# Patient Record
Sex: MALE | Race: WHITE | NOT HISPANIC OR LATINO | Employment: OTHER | URBAN - METROPOLITAN AREA
[De-identification: names, ages, dates, MRNs, and addresses within clinical notes are randomized per-mention and may not be internally consistent; named-entity substitution may affect disease eponyms.]

---

## 2017-02-20 ENCOUNTER — OFFICE VISIT (OUTPATIENT)
Dept: FAMILY MEDICINE | Facility: CLINIC | Age: 67
End: 2017-02-20
Payer: MEDICARE

## 2017-02-20 VITALS
SYSTOLIC BLOOD PRESSURE: 150 MMHG | HEIGHT: 68 IN | TEMPERATURE: 97.7 F | DIASTOLIC BLOOD PRESSURE: 74 MMHG | WEIGHT: 202 LBS | OXYGEN SATURATION: 96 % | BODY MASS INDEX: 30.62 KG/M2 | HEART RATE: 55 BPM

## 2017-02-20 DIAGNOSIS — R73.01 IMPAIRED FASTING GLUCOSE: ICD-10-CM

## 2017-02-20 DIAGNOSIS — R93.429 ABNORMAL ULTRASOUND OF KIDNEY: ICD-10-CM

## 2017-02-20 DIAGNOSIS — I10 ESSENTIAL HYPERTENSION WITH GOAL BLOOD PRESSURE LESS THAN 140/90: ICD-10-CM

## 2017-02-20 DIAGNOSIS — I70.202 ATHEROSCLEROSIS OF NATIVE ARTERY OF LEFT LOWER EXTREMITY, WITH UNSPECIFIED PRESENCE OF CLINICAL MANIFESTATION (H): Primary | ICD-10-CM

## 2017-02-20 DIAGNOSIS — I70.0 ATHEROSCLEROSIS OF AORTA (H): ICD-10-CM

## 2017-02-20 LAB
ALBUMIN SERPL-MCNC: 3.9 G/DL (ref 3.4–5)
ALP SERPL-CCNC: 86 U/L (ref 40–150)
ALT SERPL W P-5'-P-CCNC: 55 U/L (ref 0–70)
ANION GAP SERPL CALCULATED.3IONS-SCNC: 7 MMOL/L (ref 3–14)
AST SERPL W P-5'-P-CCNC: 33 U/L (ref 0–45)
BILIRUB DIRECT SERPL-MCNC: 0.2 MG/DL (ref 0–0.2)
BILIRUB SERPL-MCNC: 1.1 MG/DL (ref 0.2–1.3)
BUN SERPL-MCNC: 20 MG/DL (ref 7–30)
CALCIUM SERPL-MCNC: 8.8 MG/DL (ref 8.5–10.1)
CHLORIDE SERPL-SCNC: 104 MMOL/L (ref 94–109)
CHOLEST SERPL-MCNC: 120 MG/DL
CO2 SERPL-SCNC: 28 MMOL/L (ref 20–32)
CREAT SERPL-MCNC: 0.88 MG/DL (ref 0.66–1.25)
GFR SERPL CREATININE-BSD FRML MDRD: 86 ML/MIN/1.7M2
GLUCOSE SERPL-MCNC: 107 MG/DL (ref 70–99)
HDLC SERPL-MCNC: 54 MG/DL
LDLC SERPL CALC-MCNC: 41 MG/DL
NONHDLC SERPL-MCNC: 66 MG/DL
POTASSIUM SERPL-SCNC: 4.1 MMOL/L (ref 3.4–5.3)
PROT SERPL-MCNC: 7.6 G/DL (ref 6.8–8.8)
SODIUM SERPL-SCNC: 139 MMOL/L (ref 133–144)
TRIGL SERPL-MCNC: 123 MG/DL

## 2017-02-20 PROCEDURE — 36415 COLL VENOUS BLD VENIPUNCTURE: CPT | Performed by: FAMILY MEDICINE

## 2017-02-20 PROCEDURE — 80076 HEPATIC FUNCTION PANEL: CPT | Performed by: FAMILY MEDICINE

## 2017-02-20 PROCEDURE — 99214 OFFICE O/P EST MOD 30 MIN: CPT | Performed by: FAMILY MEDICINE

## 2017-02-20 PROCEDURE — 80048 BASIC METABOLIC PNL TOTAL CA: CPT | Performed by: FAMILY MEDICINE

## 2017-02-20 PROCEDURE — 80061 LIPID PANEL: CPT | Performed by: FAMILY MEDICINE

## 2017-02-20 PROCEDURE — 83036 HEMOGLOBIN GLYCOSYLATED A1C: CPT | Performed by: FAMILY MEDICINE

## 2017-02-20 RX ORDER — ATORVASTATIN CALCIUM 40 MG/1
TABLET, FILM COATED ORAL
Qty: 90 TABLET | Refills: 1 | Status: SHIPPED | OUTPATIENT
Start: 2017-02-20 | End: 2017-07-31

## 2017-02-20 RX ORDER — LISINOPRIL 10 MG/1
10 TABLET ORAL DAILY
Qty: 90 TABLET | Refills: 0 | Status: SHIPPED | OUTPATIENT
Start: 2017-02-20 | End: 2017-03-06

## 2017-02-20 ASSESSMENT — PAIN SCALES - GENERAL: PAINLEVEL: NO PAIN (0)

## 2017-02-20 NOTE — PATIENT INSTRUCTIONS
Please call the [facility]  [phone] to schedule your kidney ultrasound.

## 2017-02-20 NOTE — MR AVS SNAPSHOT
After Visit Summary   2/20/2017    Florencio Doshi    MRN: [id number]           Patient Information     Date Of Birth          1950        Visit Information        Provider Department      2/20/2017 8:20 AM Ant Wright MD [facility]        Today's Diagnoses     Atherosclerosis of native artery of left lower extremity, with unspecified presence of clinical manifestation (H)    -  1    Atherosclerosis of aorta (H)        Essential hypertension with goal blood pressure less than 140/90        Abnormal ultrasound of kidney        Impaired fasting glucose          Care Instructions    Please call the [facility]  [phone] to schedule your kidney ultrasound.         Follow-ups after your visit        Follow-up notes from your care team     Return in about 2 weeks (around 3/6/2017) for blood pressure check.      Future tests that were ordered for you today     Open Future Orders        Priority Expected Expires Ordered    US Renal Limited Routine 5/21/2017 2/20/2018 2/20/2017            Who to contact     If you have questions or need follow up information about today's clinic visit or your schedule please contact [facility] directly at [phone].  Normal or non-critical lab and imaging results will be communicated to you by PopUp Leasinghart, letter or phone within 4 business days after the clinic has received the results. If you do not hear from us within 7 days, please contact the clinic through PopUp Leasinghart or phone. If you have a critical or abnormal lab result, we will notify you by phone as soon as possible.  Submit refill requests through ThromboVision or call your pharmacy and they will forward the refill request to us. Please allow 3 business days for your refill to be completed.          Additional Information About Your Visit        PopUp LeasingharPedius Information     ThromboVision lets you send messages to your doctor, view your test results, renew  "your prescriptions, schedule appointments and more. To sign up, go to [email or web address] . Click on \"Log in\" on the left side of the screen, which will take you to the Welcome page. Then click on \"Sign up Now\" on the right side of the page.     You will be asked to enter the access code listed below, as well as some personal information. Please follow the directions to create your username and password.     Your access code is: [email or web address]  Expires: 2017  8:54 AM     Your access code will  in 90 days. If you need help or a new code, please call your [facility] clinic or [phone].        Care EveryWhere ID     This is your Care EveryWhere ID. This could be used by other organizations to access your [facility] medical records  [id number]        Your Vitals Were     Pulse Temperature Height Pulse Oximetry BMI (Body Mass Index)       55 97.7  F (36.5  C) (Oral) 5' 8\" (1.727 m) 96% 30.71 kg/m2        Blood Pressure from Last 3 Encounters:   17 150/74   10/10/16 (!) 163/94   16 134/80    Weight from Last 3 Encounters:   17 202 lb (91.6 kg)   10/10/16 197 lb (89.4 kg)   16 200 lb 6.4 oz (90.9 kg)              We Performed the Following     Basic metabolic panel     Hepatic panel     JUST IN CASE     Lipid panel reflex to direct LDL          Today's Medication Changes          These changes are accurate as of: 17  8:54 AM.  If you have any questions, ask your nurse or doctor.               Start taking these medicines.        Dose/Directions    lisinopril 10 MG tablet   Commonly known as:  PRINIVIL/ZESTRIL   Used for:  Essential hypertension with goal blood pressure less than 140/90   Started by:  Ant Wright MD        Dose:  10 mg   Take 1 tablet (10 mg) by mouth daily for blood pressure.   Quantity:  90 tablet   Refills:  0         These medicines have changed or have updated prescriptions.        Dose/Directions    atorvastatin 40 MG tablet   Commonly known as:  " LIPITOR   This may have changed:  See the new instructions.   Used for:  Atherosclerosis of aorta (H), Atherosclerosis of native artery of left lower extremity, with unspecified presence of clinical manifestation (H)   Changed by:  Ant Wright MD        TAKE 1 TABLET BY MOUTH EVERY DAY FOR CHOLESTEROL   Quantity:  90 tablet   Refills:  1            Where to get your medicines      These medications were sent to [facility] - [place], [place] - [address] AT [facility] [address], [facility] [place] [address]     Phone:  [phone]     atorvastatin 40 MG tablet    lisinopril 10 MG tablet                Primary Care Provider Office Phone # Fax #    Ant Wright [phone]       [facility] [address]  [facility] [address]        Thank you!     Thank you for choosing [facility]  for your care. Our goal is always to provide you with excellent care. Hearing back from our patients is one way we can continue to improve our services. Please take a few minutes to complete the written survey that you may receive in the mail after your visit with us. Thank you!             Your Updated Medication List - Protect others around you: Learn how to safely use, store and throw away your medicines at www.disposemymeds.org.          This list is accurate as of: 2/20/17  8:54 AM.  Always use your most recent med list.                   Brand Name Dispense Instructions for use    aspirin 81 MG tablet      1 TABLET DAILY*       atorvastatin 40 MG tablet    LIPITOR    90 tablet    TAKE 1 TABLET BY MOUTH EVERY DAY FOR CHOLESTEROL       fish oil-omega-3 fatty acids 1000 MG capsule      Take 1 capsule by mouth Every other day.       lisinopril 10 MG tablet    PRINIVIL/ZESTRIL    90 tablet    Take 1 tablet (10 mg) by mouth daily for blood pressure.       vitamin D 1000 UNITS capsule      Take 1 capsule by mouth daily

## 2017-02-20 NOTE — NURSING NOTE
"Chief Complaint   Patient presents with     Lipids       Initial /88 (BP Location: Left arm, Patient Position: Chair, Cuff Size: Adult Regular)  Pulse 55  Temp 97.7  F (36.5  C) (Oral)  Ht 5' 8\" (1.727 m)  Wt 202 lb (91.6 kg)  SpO2 96%  BMI 30.71 kg/m2 Estimated body mass index is 30.71 kg/(m^2) as calculated from the following:    Height as of this encounter: 5' 8\" (1.727 m).    Weight as of this encounter: 202 lb (91.6 kg).  Medication Reconciliation: gennaro Swanson MA      "

## 2017-02-20 NOTE — PROGRESS NOTES
"  SUBJECTIVE:                                                    Florencio Doshi is a 66 year old male who presents to clinic today for the following health issues:    Hyperlipidemia Follow-Up      Rate your low fat/cholesterol diet?: not monitoring fat    Taking statin?  Yes, no muscle aches from statin    Other lipid medications/supplements?:  Fish oil/Omega 3, dose without side effects       Amount of exercise or physical activity: None    Problems taking medications regularly: No    Medication side effects: none    Diet: low fat/cholesterol    Past medical, family, and social histories, medications, and allergies are reviewed and updated in Epic.     ROS:  Constitutional, HEENT, cardiovascular, pulmonary, gi and gu systems are negative, except as otherwise noted.  M/S: Patient reports he slipped and hurt his leg 2 weeks ago.      Any history above obtained by the Medical Assistant was reviewed by Dr. Ant Wright MD, and edited when necessary.    This document serves as a record of the services and decisions personally performed and made by Dr. Wright. It was created on his behalf by Dinorah Land, a trained medical scribe. The creation of this document is based on the provider's statements to the medical scribe.  Dinorah Land February 20, 2017 8:41 AM   OBJECTIVE:                                                    /74  Pulse 55  Temp 97.7  F (36.5  C) (Oral)  Ht 5' 8\" (1.727 m)  Wt 202 lb (91.6 kg)  SpO2 96%  BMI 30.71 kg/m2  Body mass index is 30.71 kg/(m^2).  GENERAL: healthy, alert and no distress  EYES: Eyes grossly normal to inspection, PERRL and conjunctivae and sclerae normal  RESP: lungs clear to auscultation - no rales, rhonchi or wheezes  CV: regular rate and rhythm, normal S1 S2, no S3 or S4, no murmur, click or rub, no peripheral edema and peripheral pulses strong  MS: no gross musculoskeletal defects noted, no edema  SKIN: no suspicious lesions or rashes  NEURO: Normal strength and " tone, mentation intact and speech normal, cranial nerves 2-12 intact   PSYCH: mentation appears normal, affect normal/bright     ASSESSMENT/PLAN:                                                    (I70.202) Atherosclerosis of native artery of left lower extremity, with unspecified presence of clinical manifestation (H)  (primary encounter diagnosis)  Comment: fasting. LDL historically at goal. Patient denies claudication symptoms.   Plan: Lipid panel reflex to direct LDL, Hepatic         panel, atorvastatin (LIPITOR) 40 MG tablet        Follow up in 6 months     (I70.0) Atherosclerosis of aorta (H)  Comment: fasting. No evidence of AAA on recent ultrasound.  Plan: Lipid panel reflex to direct LDL, Hepatic         panel, atorvastatin (LIPITOR) 40 MG tablet        Follow up in 6 months     (I10) Essential hypertension with goal blood pressure less than 140/90  Comment: new diagnosis  Plan: Basic metabolic panel, lisinopril         (PRINIVIL/ZESTRIL) 10 MG tablet        Recheck in 2 weeks    (R93.429) Abnormal ultrasound of kidney  Comment: suspected artifact, but it needs a recheck.   Plan: US Renal Limited, Basic metabolic panel            (R73.01) Impaired fasting glucose  Comment: fasting.  Plan: Basic metabolic panel, JUST IN CASE               The information in this document, created by the medical scribe for me, accurately reflects the services I personally performed and the decisions made by me. I have reviewed and approved this document for accuracy prior to leaving the patient care area.  Ant Wright MD

## 2017-02-22 ENCOUNTER — OFFICE VISIT (OUTPATIENT)
Dept: FAMILY MEDICINE | Facility: CLINIC | Age: 67
End: 2017-02-22
Payer: MEDICARE

## 2017-02-22 VITALS
BODY MASS INDEX: 30.99 KG/M2 | TEMPERATURE: 98.3 F | DIASTOLIC BLOOD PRESSURE: 79 MMHG | SYSTOLIC BLOOD PRESSURE: 132 MMHG | WEIGHT: 203.8 LBS | OXYGEN SATURATION: 95 % | HEART RATE: 74 BPM

## 2017-02-22 DIAGNOSIS — B34.9 VIRAL ILLNESS: Primary | ICD-10-CM

## 2017-02-22 PROCEDURE — 99213 OFFICE O/P EST LOW 20 MIN: CPT | Performed by: FAMILY MEDICINE

## 2017-02-22 RX ORDER — BENZONATATE 200 MG/1
200 CAPSULE ORAL 3 TIMES DAILY PRN
Qty: 21 CAPSULE | Refills: 0 | Status: SHIPPED | OUTPATIENT
Start: 2017-02-22 | End: 2017-03-01

## 2017-02-22 NOTE — NURSING NOTE
"Chief Complaint   Patient presents with     Cough       Initial /79 (BP Location: Left arm, Patient Position: Chair, Cuff Size: Adult Large)  Pulse 74  Temp 98.3  F (36.8  C) (Oral)  Wt 203 lb 12.8 oz (92.4 kg)  SpO2 95%  BMI 30.99 kg/m2 Estimated body mass index is 30.99 kg/(m^2) as calculated from the following:    Height as of 2/20/17: 5' 8\" (1.727 m).    Weight as of this encounter: 203 lb 12.8 oz (92.4 kg).  Medication Reconciliation: complete   Samantha Ledezma, [facility]  February 22, 2017 9:00 AM        "

## 2017-02-22 NOTE — PROGRESS NOTES
SUBJECTIVE:                                                    Florencio Doshi is a 66 year old male who presents to clinic today for the following health issues:      Acute Illness   Acute illness concerns: cough  Onset: 3 days    Fever: no    Chills/Sweats: YES    Headache (location?): YES    Sinus Pressure:YES    Conjunctivitis:  no    Ear Pain: no    Rhinorrhea: no     Congestion: no     Sore Throat: YES     Cough: YES - clear phlegm    Wheeze: YES    Decreased Appetite: no    Nausea: no    Vomiting: no    Diarrhea:  YES    Dysuria/Freq.: no    Fatigue/Achiness: YES    Sick/Strep Exposure: YES-was in clinic on Monday for labs and got sick after     Therapies Tried and outcome: Jennifer seltzer- cough drops      Problem list and histories reviewed & adjusted, as indicated.  Additional history: as documented    Problem list, Medication list, Allergies, and Medical/Social/Surgical histories reviewed in EPIC and updated as appropriate.    ROS:  Constitutional, HEENT, cardiovascular, pulmonary, gi and gu systems are negative, except as otherwise noted.    OBJECTIVE:                                                    /79 (BP Location: Left arm, Patient Position: Chair, Cuff Size: Adult Large)  Pulse 74  Temp 98.3  F (36.8  C) (Oral)  Wt 203 lb 12.8 oz (92.4 kg)  SpO2 95%  BMI 30.99 kg/m2  Body mass index is 30.99 kg/(m^2).  GENERAL: healthy, alert and no distress  EYES: Eyes grossly normal to inspection, PERRL and conjunctivae and sclerae normal  HENT: normal cephalic/atraumatic, ear canals and TM's normal, nasal mucosa edematous , oropharynx clear and oral mucous membranes moist  NECK: no adenopathy, no asymmetry, masses, or scars and thyroid normal to palpation  RESP: lungs clear to auscultation - no rales, rhonchi or wheezes  CV: regular rate and rhythm, normal S1 S2, no S3 or S4, no murmur, click or rub, no peripheral edema and peripheral pulses strong  ABDOMEN: soft, nontender, no hepatosplenomegaly, no  masses and bowel sounds normal  MS: no gross musculoskeletal defects noted, no edema    Diagnostic Test Results:  none      ASSESSMENT/PLAN:                                                    1. Viral illness  Symptomatic care.  - benzonatate (TESSALON) 200 MG capsule; Take 1 capsule (200 mg) by mouth 3 times daily as needed for cough  Dispense: 21 capsule; Refill: 0    The uses and side effects, including black box warnings as appropriate, were discussed in detail.  All patient questions were answered.  The patient was instructed to call immediately if any side effects developed.     FUTURE APPOINTMENTS:       - Follow-up visit in 5 - 7 days if not improved.    Talya Porter MD  [facility]

## 2017-02-22 NOTE — MR AVS SNAPSHOT
"              After Visit Summary   2/22/2017    Florencio Doshi    MRN: [id number]           Patient Information     Date Of Birth          1950        Visit Information        Provider Department      2/22/2017 9:00 AM Talya Disla MD [facility]        Today's Diagnoses     Viral illness    -  1      Care Instructions      Viral Syndrome (Adult)  A viral illness may cause a number of symptoms. The symptoms depend on the part of the body that the virus affects. If it settles in the nose, throat, and lungs, it may cause cough, sore throat, congestion, and sometimes headache. If it settles in the stomach and intestinal tract, it may cause vomiting and diarrhea. Sometimes it causes vague symptoms like \"aching all over,\" feeling tired, loss of appetite, or fever.  A viral illness usually lasts 1 to 2 weeks, but sometimes it lasts longer. In some cases, a more serious infection can look like a viral syndrome in the first few days of the illness. You may need another exam and additional tests to know the difference. Watch for the warning signs listed below.  Home care  Follow these guidelines for taking care of yourself at home:    If symptoms are severe, rest at home for the first 2 to 3 days.    Stay away from cigarette smoke - both your smoke and the smoke from others.    You may use over-the-counter medication acetaminophen or ibuprofen for fever, muscle aching, and headache, unless another medicine was prescribed for this. If you have chronic liver or kidney disease or ever had a stomach ulcer or GI bleeding, talk with your doctor before using these medicines . No one who is younger than 18 and ill with a fever should take aspirin. It may cause severe disease or death liver damage .    Your appetite may be poor, so a light diet is fine. Avoid dehydration by drinking 8 to 12 8-ounce glasses of fluids each day. This may include water; orange juice; lemonade; apple, grape, " and cranberry juice; clear fruit drinks; electrolyte replacement and sports drinks; and decaffeinated teas and coffee. If you have been diagnosed with a kidney disease, ask your doctor how much and what types of fluids you should drink to prevent dehydration. If you have kidney disease, drinking too much fluid can cause it build up in the your body and be dangerous to your health.    Over-the-counter remedies won't shorten the length of the illness but may be helpful for cough, sore throat; and nasal and sinus congestion. Don't use decongestants if you have high blood pressure.  Follow-up care  Follow up with your health care provider if you do not improve over the next week.  When to seek medical advice  Call your healthcare provider right away if any of these occur:    Cough with lots of colored sputum (mucus) or blood in your sputum    Chest pain, shortness of breath, wheezing, or difficulty breathing    Severe headache; face, neck, or ear pain    Severe, constant pain in the lower right side of your belly (abdominal)    Continued vomiting (can t keep liquids down)    Frequent diarrhea (more than 5 times a day); blood (red or black color) or mucus in diarrhea    Feeling weak, dizzy, or like you are going to faint    Extreme thirst    Fever of 100.4 F (38 C) or higher, or as directed by your healthcare provider  Call 911  Get emergency medical care if any of the following occur:    Convulsion    Feeling weak, dizzy, or like you are going to faint    Chest pain, shortness of breath, wheezing, or difficulty breathing    1040-0325 [employer]. [address] [place] [address], [place] [address]. All rights reserved. This information is not intended as a substitute for professional medical care. Always follow your healthcare professional's instructions.              Follow-ups after your visit        Your next 10 appointments already scheduled     Mar 06, 2017  8:30 AM [facility]   US RENAL LIMITED with BKUSJenise Araujo  "[facility] ([facility])    [address] [facility] [id number] [phone]           Please bring a list of your medicines (including vitamins, minerals and over-the-counter drugs). Also, tell your doctor about any allergies you may have. Wear comfortable clothes and leave your valuables at home.  You do not need to do anything special to prepare for your exam.  Please call the Imaging Department at your exam site with any questions.            Mar 06, 2017 10:00 AM CST   Office Visit with Ant Wright MD   [facility] ([facility])    [address] [facility] [id number] [phone]           Bring a current list of meds and any records pertaining to this visit.  For Physicals, please bring immunization records and any forms needing to be filled out.  Please arrive 10 minutes early to complete paperwork.              Who to contact     If you have questions or need follow up information about today's clinic visit or your schedule please contact [facility] directly at [phone].  Normal or non-critical lab and imaging results will be communicated to you by MyChart, letter or phone within 4 business days after the clinic has received the results. If you do not hear from us within 7 days, please contact the clinic through Avazu Inchart or phone. If you have a critical or abnormal lab result, we will notify you by phone as soon as possible.  Submit refill requests through Rezzcard or call your pharmacy and they will forward the refill request to us. Please allow 3 business days for your refill to be completed.          Additional Information About Your Visit        MyChart Information     Rezzcard lets you send messages to your doctor, view your test results, renew your prescriptions, schedule appointments and more. To sign up, go to [email or web address] . Click on \"Log in\" on the left " "side of the screen, which will take you to the Welcome page. Then click on \"Sign up Now\" on the right side of the page.     You will be asked to enter the access code listed below, as well as some personal information. Please follow the directions to create your username and password.     Your access code is: [email or web address]  Expires: 2017  8:54 AM     Your access code will  in 90 days. If you need help or a new code, please call your [facility] or [phone].        Care EveryWhere ID     This is your Care EveryWhere ID. This could be used by other organizations to access your [facility] medical records  [id number]        Your Vitals Were     Pulse Temperature Pulse Oximetry BMI (Body Mass Index)          74 98.3  F (36.8  C) (Oral) 95% 30.99 kg/m2         Blood Pressure from Last 3 Encounters:   17 132/79   17 150/74   10/10/16 (!) 163/94    Weight from Last 3 Encounters:   17 203 lb 12.8 oz (92.4 kg)   17 202 lb (91.6 kg)   10/10/16 197 lb (89.4 kg)              Today, you had the following     No orders found for display         Today's Medication Changes          These changes are accurate as of: 17  9:22 AM.  If you have any questions, ask your nurse or doctor.               Start taking these medicines.        Dose/Directions    benzonatate 200 MG capsule   Commonly known as:  TESSALON   Used for:  Viral illness   Started by:  Talya Disla MD        Dose:  200 mg   Take 1 capsule (200 mg) by mouth 3 times daily as needed for cough   Quantity:  21 capsule   Refills:  0            Where to get your medicines      These medications were sent to [facility] - [place] [address] AT [facility] [address], [place] [address]     Phone:  [phone]     benzonatate 200 MG capsule                Primary Care Provider Office Phone # Fax #    Ant Wright [phone]       " [facility] [address]  [facility] [address]        Thank you!     Thank you for choosing [facility]  for your care. Our goal is always to provide you with excellent care. Hearing back from our patients is one way we can continue to improve our services. Please take a few minutes to complete the written survey that you may receive in the mail after your visit with us. Thank you!             Your Updated Medication List - Protect others around you: Learn how to safely use, store and throw away your medicines at www.disposemymeds.org.          This list is accurate as of: 2/22/17  9:22 AM.  Always use your most recent med list.                   Brand Name Dispense Instructions for use    aspirin 81 MG tablet      1 TABLET DAILY*       atorvastatin 40 MG tablet    LIPITOR    90 tablet    TAKE 1 TABLET BY MOUTH EVERY DAY FOR CHOLESTEROL       benzonatate 200 MG capsule    TESSALON    21 capsule    Take 1 capsule (200 mg) by mouth 3 times daily as needed for cough       fish oil-omega-3 fatty acids 1000 MG capsule      Take 1 capsule by mouth Every other day.       lisinopril 10 MG tablet    PRINIVIL/ZESTRIL    90 tablet    Take 1 tablet (10 mg) by mouth daily for blood pressure.       vitamin D 1000 UNITS capsule      Take 1 capsule by mouth daily

## 2017-02-22 NOTE — PATIENT INSTRUCTIONS
"  Viral Syndrome (Adult)  A viral illness may cause a number of symptoms. The symptoms depend on the part of the body that the virus affects. If it settles in the nose, throat, and lungs, it may cause cough, sore throat, congestion, and sometimes headache. If it settles in the stomach and intestinal tract, it may cause vomiting and diarrhea. Sometimes it causes vague symptoms like \"aching all over,\" feeling tired, loss of appetite, or fever.  A viral illness usually lasts 1 to 2 weeks, but sometimes it lasts longer. In some cases, a more serious infection can look like a viral syndrome in the first few days of the illness. You may need another exam and additional tests to know the difference. Watch for the warning signs listed below.  Home care  Follow these guidelines for taking care of yourself at home:    If symptoms are severe, rest at home for the first 2 to 3 days.    Stay away from cigarette smoke - both your smoke and the smoke from others.    You may use over-the-counter medication acetaminophen or ibuprofen for fever, muscle aching, and headache, unless another medicine was prescribed for this. If you have chronic liver or kidney disease or ever had a stomach ulcer or GI bleeding, talk with your doctor before using these medicines . No one who is younger than 18 and ill with a fever should take aspirin. It may cause severe disease or death liver damage .    Your appetite may be poor, so a light diet is fine. Avoid dehydration by drinking 8 to 12 8-ounce glasses of fluids each day. This may include water; orange juice; lemonade; apple, grape, and cranberry juice; clear fruit drinks; electrolyte replacement and sports drinks; and decaffeinated teas and coffee. If you have been diagnosed with a kidney disease, ask your doctor how much and what types of fluids you should drink to prevent dehydration. If you have kidney disease, drinking too much fluid can cause it build up in the your body and be dangerous to " your health.    Over-the-counter remedies won't shorten the length of the illness but may be helpful for cough, sore throat; and nasal and sinus congestion. Don't use decongestants if you have high blood pressure.  Follow-up care  Follow up with your health care provider if you do not improve over the next week.  When to seek medical advice  Call your healthcare provider right away if any of these occur:    Cough with lots of colored sputum (mucus) or blood in your sputum    Chest pain, shortness of breath, wheezing, or difficulty breathing    Severe headache; face, neck, or ear pain    Severe, constant pain in the lower right side of your belly (abdominal)    Continued vomiting (can t keep liquids down)    Frequent diarrhea (more than 5 times a day); blood (red or black color) or mucus in diarrhea    Feeling weak, dizzy, or like you are going to faint    Extreme thirst    Fever of 100.4 F (38 C) or higher, or as directed by your healthcare provider  Call 911  Get emergency medical care if any of the following occur:    Convulsion    Feeling weak, dizzy, or like you are going to faint    Chest pain, shortness of breath, wheezing, or difficulty breathing    2126-8510 [employer]. [address] [place] [address], [place] [address]. All rights reserved. This information is not intended as a substitute for professional medical care. Always follow your healthcare professional's instructions.

## 2017-02-23 LAB — HBA1C MFR BLD: 5.9 % (ref 4.3–6)

## 2017-03-03 ENCOUNTER — TELEPHONE (OUTPATIENT)
Dept: FAMILY MEDICINE | Facility: CLINIC | Age: 67
End: 2017-03-03

## 2017-03-03 NOTE — TELEPHONE ENCOUNTER
Reason for Call:  Request for results:    Name of test or procedure: labs    Date of test of procedure: 2/20/17    Location of the test or procedure: [facility]    OK to leave the result message on voice mail or with a family member? YES    Phone number Patient can be reached at:  Home number on file [phone] (home)    Additional comments: any    Call taken on 3/3/2017 at 2:46 PM by Elizabeth Samuel

## 2017-03-06 ENCOUNTER — RADIANT APPOINTMENT (OUTPATIENT)
Dept: ULTRASOUND IMAGING | Facility: CLINIC | Age: 67
End: 2017-03-06
Attending: FAMILY MEDICINE
Payer: MEDICARE

## 2017-03-06 ENCOUNTER — OFFICE VISIT (OUTPATIENT)
Dept: FAMILY MEDICINE | Facility: CLINIC | Age: 67
End: 2017-03-06
Payer: MEDICARE

## 2017-03-06 VITALS
WEIGHT: 200.2 LBS | BODY MASS INDEX: 30.44 KG/M2 | TEMPERATURE: 96.8 F | SYSTOLIC BLOOD PRESSURE: 132 MMHG | DIASTOLIC BLOOD PRESSURE: 82 MMHG | HEART RATE: 65 BPM | OXYGEN SATURATION: 95 %

## 2017-03-06 DIAGNOSIS — I10 ESSENTIAL HYPERTENSION WITH GOAL BLOOD PRESSURE LESS THAN 140/90: ICD-10-CM

## 2017-03-06 DIAGNOSIS — J01.90 ACUTE SINUSITIS WITH SYMPTOMS > 10 DAYS: Primary | ICD-10-CM

## 2017-03-06 DIAGNOSIS — R93.429 ABNORMAL ULTRASOUND OF KIDNEY: ICD-10-CM

## 2017-03-06 PROCEDURE — 76775 US EXAM ABDO BACK WALL LIM: CPT

## 2017-03-06 PROCEDURE — 99214 OFFICE O/P EST MOD 30 MIN: CPT | Performed by: FAMILY MEDICINE

## 2017-03-06 RX ORDER — LISINOPRIL 10 MG/1
10 TABLET ORAL DAILY
Qty: 90 TABLET | Refills: 0 | Status: SHIPPED | OUTPATIENT
Start: 2017-03-06 | End: 2017-05-25

## 2017-03-06 RX ORDER — AMOXICILLIN 500 MG/1
500 TABLET, FILM COATED ORAL 3 TIMES DAILY
Qty: 21 TABLET | Refills: 0 | Status: SHIPPED | OUTPATIENT
Start: 2017-03-06 | End: 2017-03-13

## 2017-03-06 NOTE — MR AVS SNAPSHOT
After Visit Summary   3/6/2017    Florencio Doshi    MRN: [id number]           Patient Information     Date Of Birth          1950        Visit Information        Provider Department      3/6/2017 10:00 AM Ant Wright MD [facility]        Today's Diagnoses     Acute sinusitis with symptoms > 10 days    -  1    Essential hypertension with goal blood pressure less than 140/90          Care Instructions        ================================================================================  Normal Values   Blood pressure  <140/90 for most adults    <130/80 for some chronic diseases (ask your care team about yours)    BMI (body mass index)  18.5-25 kg/m2 (based on height and weight)     Thank you for visiting [facility]    Normal or non-critical lab and imaging results will be communicated to you by MyChart, letter or phone within 7 days.  If you do not hear from us within 10 days, please call the clinic. If you have a critical or abnormal lab result, we will notify you by phone as soon as possible.     If you have any questions regarding your visit please contact:     Team Comfort:   Clinic Hours Telephone Number   Dr. Ant Bolden 7am-5pm  Monday - Friday [phone]  Vanna Randolph RN   Pharmacy 8:00am-8pm Monday-Friday    9am-5pm Saturday-Sunday [phone]   Urgent Care 11am-9pm Monday-Friday        9am-5pm Saturday-Sunday [phone]     After hours, weekend or if you need to make an appointment with your primary provider please call [phone].   After Hours nurse advise: call [facility]: [phone]    Medication Refills:  Call your pharmacy and they will forward the refill to us. Please allow 3 business days for your refills to be completed.      Return for a recheck of sinus infection if symptoms fail to resolve by 3/16/17.        Follow-ups  "after your visit        Follow-up notes from your care team     Return in about 5 months (around 2017) for full physical.      Who to contact     If you have questions or need follow up information about today's clinic visit or your schedule please contact [facility] directly at [phone].  Normal or non-critical lab and imaging results will be communicated to you by MyChart, letter or phone within 4 business days after the clinic has received the results. If you do not hear from us within 7 days, please contact the clinic through "[employer]hart or phone. If you have a critical or abnormal lab result, we will notify you by phone as soon as possible.  Submit refill requests through [employer] or call your pharmacy and they will forward the refill request to us. Please allow 3 business days for your refill to be completed.          Additional Information About Your Visit        MyChart Information     [employer] lets you send messages to your doctor, view your test results, renew your prescriptions, schedule appointments and more. To sign up, go to [email or web address] . Click on \"Log in\" on the left side of the screen, which will take you to the Welcome page. Then click on \"Sign up Now\" on the right side of the page.     You will be asked to enter the access code listed below, as well as some personal information. Please follow the directions to create your username and password.     Your access code is: [email or web address]  Expires: 2017  8:54 AM     Your access code will  in 90 days. If you need help or a new code, please call your [facility] or [phone].        Care EveryWhere ID     This is your Care EveryWhere ID. This could be used by other organizations to access your [facility] medical records  [id number]        Your Vitals Were     Pulse Temperature Pulse Oximetry BMI (Body Mass Index)          65 96.8  F (36  C) (Oral) 95% 30.44 kg/m2         Blood Pressure from Last 3 " Encounters:   03/06/17 132/82   02/22/17 132/79   02/20/17 150/74    Weight from Last 3 Encounters:   03/06/17 200 lb 3.2 oz (90.8 kg)   02/22/17 203 lb 12.8 oz (92.4 kg)   02/20/17 202 lb (91.6 kg)              Today, you had the following     No orders found for display         Today's Medication Changes          These changes are accurate as of: 3/6/17 10:23 AM.  If you have any questions, ask your nurse or doctor.               Start taking these medicines.        Dose/Directions    amoxicillin 500 MG tablet   Commonly known as:  AMOXIL   Used for:  Acute sinusitis with symptoms > 10 days   Started by:  Ant Wright MD        Dose:  500 mg   Take 1 tablet (500 mg) by mouth 3 times daily for 7 days for sinus infection.   Quantity:  21 tablet   Refills:  0            Where to get your medicines      These medications were sent to [facility] [address] [place] [address] - [address] [facility] [address] [facility] [address]     Phone:  [phone]     amoxicillin 500 MG tablet    lisinopril 10 MG tablet                Primary Care Provider Office Phone # Fax #    Ant Wright [phone]       [facility] [address]  [facility] [address]        Thank you!     Thank you for choosing [facility]  for your care. Our goal is always to provide you with excellent care. Hearing back from our patients is one way we can continue to improve our services. Please take a few minutes to complete the written survey that you may receive in the mail after your visit with us. Thank you!             Your Updated Medication List - Protect others around you: Learn how to safely use, store and throw away your medicines at www.disposemymeds.org.          This list is accurate as of: 3/6/17 10:23 AM.  Always use your most recent med list.                   Brand Name Dispense Instructions for use    amoxicillin 500 MG tablet     AMOXIL    21 tablet    Take 1 tablet (500 mg) by mouth 3 times daily for 7 days for sinus infection.       aspirin 81 MG tablet      1 TABLET DAILY*       atorvastatin 40 MG tablet    LIPITOR    90 tablet    TAKE 1 TABLET BY MOUTH EVERY DAY FOR CHOLESTEROL       fish oil-omega-3 fatty acids 1000 MG capsule      Take 1 capsule by mouth Every other day.       lisinopril 10 MG tablet    PRINIVIL/ZESTRIL    90 tablet    Take 1 tablet (10 mg) by mouth daily for blood pressure.       vitamin D 1000 UNITS capsule      Take 1 capsule by mouth daily

## 2017-03-06 NOTE — PATIENT INSTRUCTIONS
================================================================================  Normal Values   Blood pressure  <140/90 for most adults    <130/80 for some chronic diseases (ask your care team about yours)    BMI (body mass index)  18.5-25 kg/m2 (based on height and weight)     Thank you for visiting [facility]    Normal or non-critical lab and imaging results will be communicated to you by MyChart, letter or phone within 7 days.  If you do not hear from us within 10 days, please call the clinic. If you have a critical or abnormal lab result, we will notify you by phone as soon as possible.     If you have any questions regarding your visit please contact:     Team Comfort:   Clinic Hours Telephone Number   Dr. Ant Bolden 7am-5pm  Monday - Friday [phone]  Vanna Randolph RN   Pharmacy 8:00am-8pm Monday-Friday    9am-5pm Saturday-Sunday [phone]   Urgent Care 11am-9pm Monday-Friday        9am-5pm Saturday-Sunday [phone]     After hours, weekend or if you need to make an appointment with your primary provider please call [phone].   After Hours nurse advise: call [facility]: [phone]    Medication Refills:  Call your pharmacy and they will forward the refill to us. Please allow 3 business days for your refills to be completed.      Return for a recheck of sinus infection if symptoms fail to resolve by 3/16/17.

## 2017-03-06 NOTE — NURSING NOTE
"Chief Complaint   Patient presents with     Hypertension       Initial /78 (BP Location: Left arm, Patient Position: Chair, Cuff Size: Adult Large)  Pulse 65  Temp 96.8  F (36  C) (Oral)  Wt 200 lb 3.2 oz (90.8 kg)  SpO2 95%  BMI 30.44 kg/m2 Estimated body mass index is 30.44 kg/(m^2) as calculated from the following:    Height as of 2/20/17: 5' 8\" (1.727 m).    Weight as of this encounter: 200 lb 3.2 oz (90.8 kg).  Medication Reconciliation: complete   Alley Conley CMA      "

## 2017-03-06 NOTE — PROGRESS NOTES
SUBJECTIVE:                                                    Florencio Doshi is a 66 year old male who presents to clinic today for the following health issues:    Hypertension Follow-up      Outpatient blood pressures are not being checked.    Low Salt Diet: not monitoring salt    Patient usually takes his BP meds in the mornings but has not taken it yet today       Amount of exercise or physical activity: 2-3 days/week for an average of less than 15 minutes    Problems taking medications regularly: No    Medication side effects: none    Diet: regular (no restrictions)      Acute Illness   Acute illness concerns: Cough  Onset: 2/19/17, improving but lingering cough    Fever: no    Chills/Sweats: YES    Headache (location?): YES    Sinus Pressure:YES    Conjunctivitis:  no    Ear Pain: no    Rhinorrhea: YES    Congestion: Yes    Sore Throat: no      Cough: YES, with associated upper back pain    Wheeze: YES    Decreased Appetite: no    Nausea: no    Vomiting: no    Diarrhea:  YES    Dysuria/Freq.: no    Fatigue/Achiness: YES    Sick/Strep Exposure: no     Therapies Tried and outcome: Prescribed Benzonatate 200 mg on 2/22/17      Past medical, family, and social histories, medications, and allergies are reviewed and updated in Epic.       Any history above obtained by the Medical Assistant was reviewed by Dr. Ant Wright MD, and edited when necessary.    This document serves as a record of the services and decisions personally performed and made by Dr. Wright. It was created on his behalf by Dinorah Land, a trained medical scribe. The creation of this document is based on the provider's statements to the medical scribe.  Dinorah Land March 6, 2017 10:04 AM   OBJECTIVE:                                                    /82  Pulse 65  Temp 96.8  F (36  C) (Oral)  Wt 200 lb 3.2 oz (90.8 kg)  SpO2 95%  BMI 30.44 kg/m2  Body mass index is 30.44 kg/(m^2).  GENERAL APPEARANCE ADULT: Alert, no acute  distress  EYES: PERRL, EOM normal, conjunctiva and lids normal  HENT: right TM normal, left TM normal, nose no nasal discharge or congestion, frontal sinus tenderness no, maxillary sinus tenderness no, throat/mouth:normal, mucous membranes moist  NECK: No adenopathy,masses or thyromegaly  RESP: lungs clear to auscultation   CV: normal rate, regular rhythm, no murmur or gallop  SKIN: no suspicious lesions or rashes  NEURO: Alert, oriented, speech and mentation normal, Cranial nerves 2-12 are normal.  PSYCH: mentation appears normal, affect and mood normal       Results from 2/20/17   Ref. Range 2/20/2017 09:06   Sodium Latest Ref Range: 133 - 144 mmol/L 139   Potassium Latest Ref Range: 3.4 - 5.3 mmol/L 4.1   Chloride Latest Ref Range: 94 - 109 mmol/L 104   Carbon Dioxide Latest Ref Range: 20 - 32 mmol/L 28   Urea Nitrogen Latest Ref Range: 7 - 30 mg/dL 20   Creatinine Latest Ref Range: 0.66 - 1.25 mg/dL 0.88   GFR Estimate Latest Ref Range: >60 mL/min/1.7m2 86   GFR Estimate If Black Latest Ref Range: >60 mL/min/1.7m2 >90...   Calcium Latest Ref Range: 8.5 - 10.1 mg/dL 8.8   Anion Gap Latest Ref Range: 3 - 14 mmol/L 7   Albumin Latest Ref Range: 3.4 - 5.0 g/dL 3.9   Protein Total Latest Ref Range: 6.8 - 8.8 g/dL 7.6   Bilirubin Total Latest Ref Range: 0.2 - 1.3 mg/dL 1.1   Alkaline Phosphatase Latest Ref Range: 40 - 150 U/L 86   ALT Latest Ref Range: 0 - 70 U/L 55   AST Latest Ref Range: 0 - 45 U/L 33   Hemoglobin A1C Latest Ref Range: 4.3 - 6.0 % 5.9   Bilirubin Direct Latest Ref Range: 0.0 - 0.2 mg/dL 0.2   Cholesterol Latest Ref Range: <200 mg/dL 120   HDL Cholesterol Latest Ref Range: >39 mg/dL 54   LDL Cholesterol Calculated Latest Ref Range: <100 mg/dL 41   Non HDL Cholesterol Latest Ref Range: <130 mg/dL 66   Triglycerides Latest Ref Range: <150 mg/dL 123     Glucose Latest Ref Range: 70 - 99 mg/dL 107 (H)        ASSESSMENT/PLAN:                                                    (J01.90) Acute sinusitis with  symptoms > 10 days  (primary encounter diagnosis)  Comment:   Plan: amoxicillin (AMOXIL) 500 MG tablet        Follow up if symptoms worsen or fail to resolve by 3/16/17    (I10) Essential hypertension with goal blood pressure less than 140/90  Comment: well controlled  Plan: lisinopril (PRINIVIL/ZESTRIL) 10 MG tablet        Follow up in 5 months, around 8/13/17, at [facility]       The information in this document, created by the medical scribe for me, accurately reflects the services I personally performed and the decisions made by me. I have reviewed and approved this document for accuracy prior to leaving the patient care area.  Ant Wright MD

## 2017-05-25 ENCOUNTER — TELEPHONE (OUTPATIENT)
Dept: FAMILY MEDICINE | Facility: CLINIC | Age: 67
End: 2017-05-25

## 2017-05-25 ENCOUNTER — RADIANT APPOINTMENT (OUTPATIENT)
Dept: GENERAL RADIOLOGY | Facility: CLINIC | Age: 67
End: 2017-05-25
Attending: FAMILY MEDICINE
Payer: MEDICARE

## 2017-05-25 ENCOUNTER — OFFICE VISIT (OUTPATIENT)
Dept: FAMILY MEDICINE | Facility: CLINIC | Age: 67
End: 2017-05-25
Payer: MEDICARE

## 2017-05-25 VITALS
WEIGHT: 206 LBS | SYSTOLIC BLOOD PRESSURE: 138 MMHG | BODY MASS INDEX: 31.22 KG/M2 | OXYGEN SATURATION: 94 % | TEMPERATURE: 98.3 F | DIASTOLIC BLOOD PRESSURE: 83 MMHG | HEIGHT: 68 IN | HEART RATE: 64 BPM

## 2017-05-25 DIAGNOSIS — M25.471 PAIN AND SWELLING OF RIGHT ANKLE: Primary | ICD-10-CM

## 2017-05-25 DIAGNOSIS — M25.471 PAIN AND SWELLING OF RIGHT ANKLE: ICD-10-CM

## 2017-05-25 DIAGNOSIS — I10 ESSENTIAL HYPERTENSION WITH GOAL BLOOD PRESSURE LESS THAN 140/90: ICD-10-CM

## 2017-05-25 DIAGNOSIS — M25.571 PAIN AND SWELLING OF RIGHT ANKLE: Primary | ICD-10-CM

## 2017-05-25 DIAGNOSIS — M25.571 PAIN AND SWELLING OF RIGHT ANKLE: ICD-10-CM

## 2017-05-25 LAB — URATE SERPL-MCNC: 5.4 MG/DL (ref 3.5–7.2)

## 2017-05-25 PROCEDURE — 99214 OFFICE O/P EST MOD 30 MIN: CPT | Performed by: FAMILY MEDICINE

## 2017-05-25 PROCEDURE — 84550 ASSAY OF BLOOD/URIC ACID: CPT | Performed by: FAMILY MEDICINE

## 2017-05-25 PROCEDURE — 36415 COLL VENOUS BLD VENIPUNCTURE: CPT | Performed by: FAMILY MEDICINE

## 2017-05-25 PROCEDURE — 73610 X-RAY EXAM OF ANKLE: CPT | Mod: RT

## 2017-05-25 RX ORDER — INDOMETHACIN 50 MG/1
50 CAPSULE ORAL
Qty: 60 CAPSULE | Refills: 0 | Status: SHIPPED | OUTPATIENT
Start: 2017-05-25 | End: 2017-06-01

## 2017-05-25 RX ORDER — LISINOPRIL 10 MG/1
10 TABLET ORAL DAILY
Qty: 90 TABLET | Refills: 0 | Status: SHIPPED | OUTPATIENT
Start: 2017-05-25 | End: 2017-07-31

## 2017-05-25 NOTE — PROGRESS NOTES
"  SUBJECTIVE:                                                    Florencio Doshi is a 67 year old male who presents to clinic today for the following health issues:    Patient explains that he has experienced aching in his ankle over the past few days.  He does not recall twisting his ankle. Similar pain occurred after the patient walked \"all day\" in [place] back in March.  This current pain began after mowing his lawn and working outside for most of the day.  He reports pain while standing as well. Ibuprofen has been helpful.      Joint Pain     Onset: 2 days    Description:   Location: right ankle  Character: Sharp    Intensity: moderate, severe    Progression of Symptoms: better    Accompanying Signs & Symptoms:  Other symptoms: swelling   History:   Previous similar pain: YES, see above.       Precipitating factors:   Trauma or overuse: YES- standing and walking all day, but no specific strain or injury.    Alleviating factors:  Improved by: nothing       Therapies Tried and outcome: ibuprofen and cold, mild relief        ROS:  Constitutional, HEENT, cardiovascular, pulmonary, gi and gu systems are negative, except as otherwise noted.    Any history above obtained by the Medical Assistant was reviewed by Dr. Ant Wright MD, and edited when necessary.    This document serves as a record of the services and decisions personally performed and made by Dr. Wright. It was created on his behalf by Ant Oliva, a trained medical scribe. The creation of this document is based the provider's statements to the medical scribe.  May 25, 2017, 11:49 AM      OBJECTIVE:                                                    /83 (BP Location: Left arm, Patient Position: Chair, Cuff Size: Adult Regular)  Pulse 64  Temp 98.3  F (36.8  C) (Oral)  Ht 1.715 m (5' 7.5\")  Wt 93.4 kg (206 lb)  SpO2 94%  BMI 31.79 kg/m2  Body mass index is 31.79 kg/(m^2).    GENERAL: healthy, alert and no distress  EYES: Eyes " grossly normal to inspection, PERRL, EOMI, sclerae white and conjunctivae normal  MS: no gross musculoskeletal defects noted, no edema  SKIN: no suspicious lesions or rashes  NEURO: Normal strength and tone, sensory exam grossly normal, mentation intact, oriented times 3 and cranial nerves 2-12 intact  PSYCH: mentation appears normal, affect normal/bright  MS: Some mild edema in the area of the right medial malleolus including some focal tenderness there    A ankle X-Ray was ordered. My reading of this film is negative for bony lesion.  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 (No comparison films available: pending review by Radiologist.)           ASSESSMENT/PLAN:                                                      ASSESSMENT/PLAN:  (M25.571,  M25.471) Pain and swelling of right ankle medially  (primary encounter diagnosis)  Comment: Since this is a recent recurrence in the same location, consider possible gout (however he had a visit for suspected gout on 2/15/10 where his Uric acid level was 6.6)   Plan: Uric acid, XR Ankle Right G/E 3 Views,         indomethacin (INDOCIN) 50 MG capsule        Handout(s) on gout provided.     (I10) Essential hypertension with goal blood pressure less than 140/90  Comment: Prescription update.   Plan: lisinopril (PRINIVIL/ZESTRIL) 10 MG tablet        Other labs and prescriptions to be updated at his [facility] in early September.      The information in this document, created by the medical scribe for me, accurately reflects the services I personally performed and the decisions made by me. I have reviewed and approved this document for accuracy prior to leaving the patient care area.  Ant Wright MD

## 2017-05-25 NOTE — TELEPHONE ENCOUNTER
Reason for Call:  Other prescription    Detailed comments: [employer] formulary exception fax is coming    Phone Number [facility] can be reached at: [phone]    Best Time: any    Can we leave a detailed message on this number? YES    Call taken on 5/25/2017 at 12:48 PM by Elizabeth Samuel

## 2017-05-25 NOTE — LETTER
[facility]  [address]  [facility] [address]      May 30, 2017      Florencio Doshi  [address]  [facility] [place] [address]            Dear Florencio Doshi    Your uric acid level was normal. Gout is unlikely.      Enclosed is a copy of the results.  It was a pleasure to see you at your last appointment.    If you have any questions or concerns, please call myself or my nurse at [phone].      Sincerely,      Ant Wright MD/GABRIELLA Jin MA      Results for orders placed or performed in visit on 05/25/17   Uric acid   Result Value Ref Range    Uric Acid 5.4 3.5 - 7.2 mg/dL

## 2017-05-25 NOTE — PATIENT INSTRUCTIONS
This summary includes the important diagnoses, test, medications and other important parts of your medical history.  Below are a few good we sites you can use to learn more about these.     Www.awe.sm.org : Up to date and easily searchable information on multiple topics.  Www.awe.sm.org/Pharmacy/c_539084.asp : [facility] $4.99 medications  Www.medlineplus.gov : medication info, interactive tutorials, watch real surgeries online  Www.familydoctor.org : good info from the Academy of Family Physicians  Www.[facility].com : good info from the [facility]  Www.cdc.gov : public health info, travel advisories, epidemics (H1N1)  Www.aap.org : children's health info, normal development, vaccinations  Www.health.[facility].[place].us : [place] dept of heat, public health issues in [place], N1N1    Based on your medical history and these are the current health maintenance or preventive care services that you are due for (some may have been done at this visit:)  Health Maintenance Due   Topic Date Due     AORTIC ANEURYSM SCREENING (SYSTEM ASSIGNED)  03/23/2016     =================================================================================  Normal Values   Blood pressure  <140/90 for most adults    <130/80 for some chronic diseases (ask your care team about yours)    BMI (body mass index)  18.5-25 kg/m2 (based on height and weight)     Thank you for visiting [facility]    Normal or non-critical lab and imaging results will be communicated to you by MyChart, letter or phone within 7 days.  If you do not hear from us within 10 days, please call the clinic. If you have a critical or abnormal lab result, we will notify you by phone as soon as possible.     If you have any questions regarding your visit please contact:     Team Comfort:   Clinic Hours Telephone Number   Dr. Ant Catherine   7am-5pm  Monday - Friday [phone]  Bennie  RN   Pharmacy 8am-8pm Monday-Thursday      8am-6pm Friday  9am-5pm Saturday-Sunday [phone]   Urgent Care 11am-8pm Monday-Friday        9am-5pm Saturday-Sunday [phone]     After hours, weekend or if you need to make an appointment with your primary provider please call [phone].   After Hours nurse advise: call [facility]: [phone]    Medication Refills:  Call your pharmacy and they will forward the refill to us. Please allow 3 business days for your refills to be completed.    Use [employer] (secure email communication and access to your chart) to send your primary care provider a message or make an appointment. Ask someone on your Team how to sign up for [employer]. To log on to [employer] or for more information in [employer] please visit the website at [email or web address].  As of October 8, 2013, all password changes, disabled accounts, or ID changes in [employer]/MyHealth will be done by our Access Services Department.   If you need help with your account or password, call: [phone]. Clinic staff no longer has the ability to change passwords.     What Is Gout?  Gout is a disease that affects the joints. Left untreated, it can lead to a painful foot deformity and even kidney problems. But, by treating gout early, you can relieve pain and help prevent future problems. Gout can usually be treated with medication and proper diet. In severe cases, surgery may be needed.    What Causes Gout?  Gout is caused by an excess of uric acid (a waste product made by the body). The uric acid forms crystals that collect in the joints, bringing on a gout attack. If you have many gout attacks, crystals may form large deposits called tophi. Tophi can damage joints and cause deformity.    Who Is at Risk for Gout?  Men are more likely to have gout than women. But women can also be affected, mostly after menopause. Some health problems, such as obesity and high cholesterol, make gout more likely. And  some medications, such as diuretics ( water pills ), can trigger a gout attack. People who drink a lot of alcohol are at high risk for gout. Certain foods can also trigger a gout attack.  Substances That May Trigger a Gout Attack  To help prevent a gout attack, avoid these foods:    Alcohol (beer, red wine)    Certain meats (red meat, processed meat, turkey)    Organ meats (kidney, liver, sweetbread)    Shellfish (lobster, crab, shrimp, scallop, mussel)    Certain fish (anchovy, sardine, mccord, mackerel)       9741-9424 [employer]. [address] [place] [address]. All rights reserved. This information is not intended as a substitute for professional medical care. Always follow your healthcare professional's instructions.        Uric Acid (Blood)  Does this test have other names?  Serum uric acid  What is this test?  This test measures the amount of uric acid in your blood.  Uric acid is a normal bodily waste product. It forms when chemicals called purines break down. Purines are a natural substance found in the body and are also found in many foods such as liver, shellfish, and alcohol. They can also be formed in the body when DNA is broken down.   When purines are broken down to uric acid in the blood, the body gets rid of it when you urinate or have a bowel movement. But if your body makes too much uric acid, or if your kidneys aren't working properly, uric acid can build up in the blood. Uric acid levels can also increase when you eat too many high-purine foods or take certain medicines like diuretics, aspirin, and niacin. Then crystals of uric acid can form and collect in the joints, causing painful inflammation. This condition is called gout.  Why do I need this test?  You might need this test if your healthcare provider wants to see whether you have high levels of uric acid in your blood. Your provider may recommend this test if you have symptoms of gout, although most people with  hyperuricemia don't develop gout. Symptoms of gout include:    Joint pain or tenderness    Swelling in a joint or reddened skin around a joint    Swelling and pain in the big toe, ankle, or knee    Joints that are hot to the touch    Swelling and pain that affects only one joint in the body    Skin that looks shiny and is red or purple  You may also need this test if you have symptoms of kidney stones. Symptoms include:    Severe pain along your lower back. This may repeatedly get worse and then ease up. The pain may also travel to your genitals.    Nausea    Vomiting    Urgent need to urinate    Blood in your urine  What other tests might I have along with this test?  Your healthcare provider may also order other tests to diagnose gout, include looking at a sample of joint fluid drawn out with a needle.  Your provider may also order a urinalysis if he or she suspects that you have a kidney stone. The urinalysis looks for blood, white blood cells, and crystals.  Your provider may also order tests of your blood and urine to find out what's causing the high levels uric acid.  What do my test results mean?  Many things may affect your lab test results. These include the method each lab uses to do the test. Even if your test results are different from the normal value, you may not have a problem. To learn what the results mean for you, talk with your healthcare provider.  Results are given in milligrams per deciliter (mg/dL). Here are results that may mean you have hyperuricemia:    For females: higher than 6 mg/dL    For males: higher than 7 mg/dL  Many health conditions can cause high levels of uric acid. These include cancer, kidney disease, hypothyroidism, hyperparathyroidism, and sarcoidosis.  Your uric acid levels may be high if you eat foods high in purines, such as organ meats, dried beans and peas, and certain fish - anchovies, herring, sardines, and mackerel. High levels can also be caused by a low-salt  diet.  How is this test done?  The test requires a blood sample, which is drawn through a needle from a vein in your arm.  Does this test pose any risks?  Taking a blood sample with a needle carries risks that include bleeding, infection, bruising, or feeling dizzy. When the needle pricks your arm, you may feel a slight stinging sensation or pain. Afterward, the site may be slightly sore.  What might affect my test results?  Certain medicines may affect your test results. These include:    Aspirin and other medicines that contain salicylate    Cyclosporine, a medicine sometimes used for autoimmune diseases    Levodopa, a medicine used to treat Parkinson disease    Certain diuretic medicines such as hydrochlorothiazide    Vitamin B-3 (niacin)  Other things that may affect your test results include:    Vigorous exercise    Chemotherapy or radiation therapy to treat cancer    Foods high in purines, including organ meats, mushrooms, some types of fish and seafood, and dried peas and beans   How do I get ready for this test?  Ask your healthcare provider if you should avoid any foods, beverages, or medications before the test.  Be sure your provider knows about all medicines, herbs, vitamins, and supplements you are taking. This includes medicines that don't need a prescription and any illicit drugs you may use.        8388-9232 [employer]. [address] [place] [address], [place] [address]. All rights reserved. This information is not intended as a substitute for professional medical care. Always follow your healthcare professional's instructions.        Eating to Prevent Gout  Gout is a painful form of arthritis caused by an excess of uric acid. This is a waste product made by the body. It builds up in the body and forms crystals that collect in the joints, bringing on a gout attack. Alcohol and certain foods can trigger a gout attack. Below are some guidelines for changing your diet to help you manage gout. Your  healthcare provider can work with you to determine the best eating plan for you. Know that diet is only one part of managing gout. Take your medicines as prescribed and follow the other guidelines your healthcare provider has given you.  Foods to limit  Eating too many foods containing purines may increase the levels of uric acid in your body and increase your risk for a gout attack. It may be best to limit these high-purine foods:    Alcohol (beer, red wine). You may be told to avoid alcohol completely.    Certain fish (anchovies, sardines, fish roes, herring, tuna, mussels, codfish, scallops, trout, and lila)    Certain meats (red meat, processed meat, ceja, turkey, wild game, and goose)    Sauces and gravies made with meat    Organ meats (such as liver, kidneys, sweetbreads, and tripe)    Legumes (such as dried beans, peas)    Mushrooms, spinach, asparagus, and cauliflower    Yeast and yeast extract supplements  Foods to try  Some foods may be helpful for people with gout. You may want to try adding some of the following foods to your diet:    Dark berries: These include blueberries, blackberries, and cherries. These berries contain chemicals that may lower uric acid.    Tofu: Tofu, which is made from soy, is a good source of protein. Studies have shown that it may be a better choice than meat for people with gout.    Omega fatty acids: These acids are found in fatty fish (such as salmon), certain oils (such as flax, olive, or nut oils), or nuts. They may help prevent inflammation due to gout.  The following guidelines are recommended by the American Medical Association for people with gout. Your diet should be:    High in fiber, whole grains, fruits, and vegetables.    Low in protein (15% of calories should come from protein. Choose lean sources such as soy, lean meats, and poultry).    Low in fat (no more than 30% of calories should come from fat, with only 10% coming from animal fat).     1892-2686 [employer]. [address] [place] [address], [place] [address]. All rights reserved. This information is not intended as a substitute for professional medical care. Always follow your healthcare professional's instructions.        Gout Diet  Gout is a painful condition caused by an excess of uric acid, a waste product made by the body. Uric acid forms crystals that collect in the joints. This brings on symptoms of joint pain and swelling. This is called a gout attack. Often, medications and diet changes are combined to manage gout. Below are some guidelines for changing your diet to help you manage gout and prevent attacks. Your health care provider will help you determine the best eating plan for you.     Limiting or avoiding certain foods can help prevent gout attacks.   Eating to manage gout  Weight loss for those who are overweight may help reduce gout attacks.  Eat less of these foods  Eating too many foods containing purines may raise the levels of uric acid in your body. This raises your risk for a gout attack. Try to limit these foods and drinks:    Alcohol, such as beer and red wine. You may be told to avoid alcohol completely.    Soft drinks that contain sugar or high fructose corn syrup    Certain fish, including anchovies, sardines, fish eggs, and herring    Certain meats, such as red meat, hot dogs, luncheon meats, and turkey    Organ meats, such as liver, kidneys, and sweetbreads    Legumes, such as dried beans and peas    Mushrooms, spinach, asparagus, and cauliflower    Other high fat foods such as gravy, whole milk, and high fat cheeses  Eat more of these foods  Other foods may be helpful for people with gout. Add some of these foods to your diet:    Dark berries, such as blueberries, blackberries, and cherries. These contain chemicals that may lower uric acid.    Tofu, a source of protein made from soy. Studies have shown that it may be a better choice than meat for people with gout.    Omega  fatty acids. These are found in some fatty fish such as salmon, certain oils (flax, olive, or nut), and nuts themselves. Omega fatty acids may help prevent inflammation due to gout.    Dairy products that are low-fat or fat-free, such as cheese and yogurt    Complex carbohydrate foods, including whole grains, brown rice, oats, and beans    Coffee, in moderation  Follow-up care  Follow up with your health care provider, or as advised.  When to seek medical advice  Call your health care provider right away if any of these occur:    Return of gout symptoms, usually at night:    Severe pain, swelling, and heat in a joint, especially the base of the big toe    Affected joint is hard to move    Skin of the affected joint is purple or red    Fever of 100.4 F (38 C) or higher    Pain that doesn't get better even with prescribed medicine     6919-1951 [employer]. [address] [place] [address]. All rights reserved. This information is not intended as a substitute for professional medical care. Always follow your healthcare professional's instructions.        Treating Gout Attacks  Gout is a disease that affects the joints. Left untreated, it can lead to painful foot deformity and even kidney problems. But, by treating gout early, you can relieve pain and help prevent future problems. Gout can usually be treated with medication and proper diet. In severe cases, surgery may be needed.  Gout attacks are painful and often happen more than once. Taking medications may reduce pain and prevent attacks in the future. There are also some things you can do at home to relieve symptoms.    Medications for Gout  Your health care provider may prescribe a daily medication to reduce levels of uric acid. This may help prevent gout attacks. Other medications can help relieve pain and swelling during an attack. Be sure to take your medication as directed.  What You Can Do  Below are some things you can do at home to  relieve gout symptoms. Your health care provider may have other tips.    Rest the painful joint as much as you can.    Raise the painful joint so it is at a level higher than your heart.  How Can I Prevent Gout?  With a little effort, you may be able to prevent gout attacks in the future. Here are some things you can do:    Avoid alcohol and foods that trigger gout.    Avoid foods high in purines    Certain meats (red meat, processed meat, turkey)    Organ meats (kidney, liver, sweetbread)    Shellfish (lobster, crab, shrimp, scallop, mussel)    Certain fish (anchovy, sardine, herring, mackerel)    Take any medications prescribed by your health care provider.    Lose weight if you need to.    Control blood pressure and cholesterol.    Drink plenty of water to help flush uric acid from your body.    0164-1956 [employer]. [address] [place] [address], [place] [address]. All rights reserved. This information is not intended as a substitute for professional medical care. Always follow your healthcare professional's instructions.

## 2017-05-25 NOTE — MR AVS SNAPSHOT
After Visit Summary   5/25/2017    Florencio Doshi    MRN: [id number]           Patient Information     Date Of Birth          1950        Visit Information        Provider Department      5/25/2017 11:20 AM Ant Wright MD [facility]        Today's Diagnoses     Pain and swelling of right ankle medially    -  1    Essential hypertension with goal blood pressure less than 140/90          Care Instructions    This summary includes the important diagnoses, test, medications and other important parts of your medical history.  Below are a few good we sites you can use to learn more about these.     Www.[place].org : Up to date and easily searchable information on multiple topics.  Www.[email or web address] : [facility] $4.99 medications  Www.Scholaroo.gov : medication info, interactive tutorials, watch real surgeries online  Www.familydoctor.org : good info from the Academy of Family Physicians  Www.DataNitro.Posterous : good info from the [facility]  Www.cdc.gov : public health info, travel advisories, epidemics (H1N1)  Www.aap.org : children's health info, normal development, vaccinations  Www.health.state.[place].us : [place] dept of heatlh, public health issues in [place], N1N1    Based on your medical history and these are the current health maintenance or preventive care services that you are due for (some may have been done at this visit:)  Health Maintenance Due   Topic Date Due     AORTIC ANEURYSM SCREENING (SYSTEM ASSIGNED)  03/23/2016     =================================================================================  Normal Values   Blood pressure  <140/90 for most adults    <130/80 for some chronic diseases (ask your care team about yours)    BMI (body mass index)  18.5-25 kg/m2 (based on height and weight)     Thank you for visiting [facility]    Normal or non-critical lab and imaging results will be communicated to you by Renee  letter or phone within 7 days.  If you do not hear from us within 10 days, please call the clinic. If you have a critical or abnormal lab result, we will notify you by phone as soon as possible.     If you have any questions regarding your visit please contact:     Team Comfort:   Clinic Hours Telephone Number   Dr. Ant Dorman  [facility]   7am-5pm  Monday - Friday [phone]  [facility]   Pharmacy 8am-8pm Monday-Thursday      8am-6pm Friday  9am-5pm Saturday-Sunday [phone]   Urgent Care 11am-8pm Monday-Friday        9am-5pm Saturday-Sunday [phone]     After hours, weekend or if you need to make an appointment with your primary provider please call [phone].   After Hours nurse advise: call [facility]: [phone]    Medication Refills:  Call your pharmacy and they will forward the refill to us. Please allow 3 business days for your refills to be completed.    Use ShareRoot (secure email communication and access to your chart) to send your primary care provider a message or make an appointment. Ask someone on your Team how to sign up for ShareRoot. To log on to ProcessUnity or for more information in CyrusOne please visit the website at [email or web address].  As of October 8, 2013, all password changes, disabled accounts, or ID changes in ShareRoot/MyHealth will be done by our Access Services Department.   If you need help with your account or password, call: [phone]. Clinic staff no longer has the ability to change passwords.     What Is Gout?  Gout is a disease that affects the joints. Left untreated, it can lead to a painful foot deformity and even kidney problems. But, by treating gout early, you can relieve pain and help prevent future problems. Gout can usually be treated with medication and proper diet. In severe cases, surgery may be needed.    What Causes Gout?  Gout is caused by an excess of uric acid (a  waste product made by the body). The uric acid forms crystals that collect in the joints, bringing on a gout attack. If you have many gout attacks, crystals may form large deposits called tophi. Tophi can damage joints and cause deformity.    Who Is at Risk for Gout?  Men are more likely to have gout than women. But women can also be affected, mostly after menopause. Some health problems, such as obesity and high cholesterol, make gout more likely. And some medications, such as diuretics ( water pills ), can trigger a gout attack. People who drink a lot of alcohol are at high risk for gout. Certain foods can also trigger a gout attack.  Substances That May Trigger a Gout Attack  To help prevent a gout attack, avoid these foods:    Alcohol (beer, red wine)    Certain meats (red meat, processed meat, turkey)    Organ meats (kidney, liver, sweetbread)    Shellfish (lobster, crab, shrimp, scallop, mussel)    Certain fish (anchovy, sardine, mccord, mackerel)       8448-9941 [employer]. [address] [place] [address]. All rights reserved. This information is not intended as a substitute for professional medical care. Always follow your healthcare professional's instructions.        Uric Acid (Blood)  Does this test have other names?  Serum uric acid  What is this test?  This test measures the amount of uric acid in your blood.  Uric acid is a normal bodily waste product. It forms when chemicals called purines break down. Purines are a natural substance found in the body and are also found in many foods such as liver, shellfish, and alcohol. They can also be formed in the body when DNA is broken down.   When purines are broken down to uric acid in the blood, the body gets rid of it when you urinate or have a bowel movement. But if your body makes too much uric acid, or if your kidneys aren't working properly, uric acid can build up in the blood. Uric acid levels can also increase when you eat too  many high-purine foods or take certain medicines like diuretics, aspirin, and niacin. Then crystals of uric acid can form and collect in the joints, causing painful inflammation. This condition is called gout.  Why do I need this test?  You might need this test if your healthcare provider wants to see whether you have high levels of uric acid in your blood. Your provider may recommend this test if you have symptoms of gout, although most people with hyperuricemia don't develop gout. Symptoms of gout include:    Joint pain or tenderness    Swelling in a joint or reddened skin around a joint    Swelling and pain in the big toe, ankle, or knee    Joints that are hot to the touch    Swelling and pain that affects only one joint in the body    Skin that looks shiny and is red or purple  You may also need this test if you have symptoms of kidney stones. Symptoms include:    Severe pain along your lower back. This may repeatedly get worse and then ease up. The pain may also travel to your genitals.    Nausea    Vomiting    Urgent need to urinate    Blood in your urine  What other tests might I have along with this test?  Your healthcare provider may also order other tests to diagnose gout, include looking at a sample of joint fluid drawn out with a needle.  Your provider may also order a urinalysis if he or she suspects that you have a kidney stone. The urinalysis looks for blood, white blood cells, and crystals.  Your provider may also order tests of your blood and urine to find out what's causing the high levels uric acid.  What do my test results mean?  Many things may affect your lab test results. These include the method each lab uses to do the test. Even if your test results are different from the normal value, you may not have a problem. To learn what the results mean for you, talk with your healthcare provider.  Results are given in milligrams per deciliter (mg/dL). Here are results that may mean you have  hyperuricemia:    For females: higher than 6 mg/dL    For males: higher than 7 mg/dL  Many health conditions can cause high levels of uric acid. These include cancer, kidney disease, hypothyroidism, hyperparathyroidism, and sarcoidosis.  Your uric acid levels may be high if you eat foods high in purines, such as organ meats, dried beans and peas, and certain fish - anchovies, herring, sardines, and mackerel. High levels can also be caused by a low-salt diet.  How is this test done?  The test requires a blood sample, which is drawn through a needle from a vein in your arm.  Does this test pose any risks?  Taking a blood sample with a needle carries risks that include bleeding, infection, bruising, or feeling dizzy. When the needle pricks your arm, you may feel a slight stinging sensation or pain. Afterward, the site may be slightly sore.  What might affect my test results?  Certain medicines may affect your test results. These include:    Aspirin and other medicines that contain salicylate    Cyclosporine, a medicine sometimes used for autoimmune diseases    Levodopa, a medicine used to treat Parkinson disease    Certain diuretic medicines such as hydrochlorothiazide    Vitamin B-3 (niacin)  Other things that may affect your test results include:    Vigorous exercise    Chemotherapy or radiation therapy to treat cancer    Foods high in purines, including organ meats, mushrooms, some types of fish and seafood, and dried peas and beans   How do I get ready for this test?  Ask your healthcare provider if you should avoid any foods, beverages, or medications before the test.  Be sure your provider knows about all medicines, herbs, vitamins, and supplements you are taking. This includes medicines that don't need a prescription and any illicit drugs you may use.        4331-2057 [employer]. [address] [place] [address], [place] [address]. All rights reserved. This information is not intended as a substitute for  professional medical care. Always follow your healthcare professional's instructions.        Eating to Prevent Gout  Gout is a painful form of arthritis caused by an excess of uric acid. This is a waste product made by the body. It builds up in the body and forms crystals that collect in the joints, bringing on a gout attack. Alcohol and certain foods can trigger a gout attack. Below are some guidelines for changing your diet to help you manage gout. Your healthcare provider can work with you to determine the best eating plan for you. Know that diet is only one part of managing gout. Take your medicines as prescribed and follow the other guidelines your healthcare provider has given you.  Foods to limit  Eating too many foods containing purines may increase the levels of uric acid in your body and increase your risk for a gout attack. It may be best to limit these high-purine foods:    Alcohol (beer, red wine). You may be told to avoid alcohol completely.    Certain fish (anchovies, sardines, fish roes, herring, tuna, mussels, codfish, scallops, trout, and lila)    Certain meats (red meat, processed meat, ceja, turkey, wild game, and goose)    Sauces and gravies made with meat    Organ meats (such as liver, kidneys, sweetbreads, and tripe)    Legumes (such as dried beans, peas)    Mushrooms, spinach, asparagus, and cauliflower    Yeast and yeast extract supplements  Foods to try  Some foods may be helpful for people with gout. You may want to try adding some of the following foods to your diet:    Dark berries: These include blueberries, blackberries, and cherries. These berries contain chemicals that may lower uric acid.    Tofu: Tofu, which is made from soy, is a good source of protein. Studies have shown that it may be a better choice than meat for people with gout.    Omega fatty acids: These acids are found in fatty fish (such as salmon), certain oils (such as flax, olive, or nut oils), or nuts. They may  help prevent inflammation due to gout.  The following guidelines are recommended by the American Medical Association for people with gout. Your diet should be:    High in fiber, whole grains, fruits, and vegetables.    Low in protein (15% of calories should come from protein. Choose lean sources such as soy, lean meats, and poultry).    Low in fat (no more than 30% of calories should come from fat, with only 10% coming from animal fat).     0974-0863 [employer]. [address] [place] [address]. All rights reserved. This information is not intended as a substitute for professional medical care. Always follow your healthcare professional's instructions.        Gout Diet  Gout is a painful condition caused by an excess of uric acid, a waste product made by the body. Uric acid forms crystals that collect in the joints. This brings on symptoms of joint pain and swelling. This is called a gout attack. Often, medications and diet changes are combined to manage gout. Below are some guidelines for changing your diet to help you manage gout and prevent attacks. Your health care provider will help you determine the best eating plan for you.     Limiting or avoiding certain foods can help prevent gout attacks.   Eating to manage gout  Weight loss for those who are overweight may help reduce gout attacks.  Eat less of these foods  Eating too many foods containing purines may raise the levels of uric acid in your body. This raises your risk for a gout attack. Try to limit these foods and drinks:    Alcohol, such as beer and red wine. You may be told to avoid alcohol completely.    Soft drinks that contain sugar or high fructose corn syrup    Certain fish, including anchovies, sardines, fish eggs, and herring    Certain meats, such as red meat, hot dogs, luncheon meats, and turkey    Organ meats, such as liver, kidneys, and sweetbreads    Legumes, such as dried beans and peas    Mushrooms, spinach,  asparagus, and cauliflower    Other high fat foods such as gravy, whole milk, and high fat cheeses  Eat more of these foods  Other foods may be helpful for people with gout. Add some of these foods to your diet:    Dark berries, such as blueberries, blackberries, and cherries. These contain chemicals that may lower uric acid.    Tofu, a source of protein made from soy. Studies have shown that it may be a better choice than meat for people with gout.    Omega fatty acids. These are found in some fatty fish such as salmon, certain oils (flax, olive, or nut), and nuts themselves. Omega fatty acids may help prevent inflammation due to gout.    Dairy products that are low-fat or fat-free, such as cheese and yogurt    Complex carbohydrate foods, including whole grains, brown rice, oats, and beans    Coffee, in moderation  Follow-up care  Follow up with your health care provider, or as advised.  When to seek medical advice  Call your health care provider right away if any of these occur:    Return of gout symptoms, usually at night:    Severe pain, swelling, and heat in a joint, especially the base of the big toe    Affected joint is hard to move    Skin of the affected joint is purple or red    Fever of 100.4 F (38 C) or higher    Pain that doesn't get better even with prescribed medicine     3671-5955 [employer]. [address] [place] [address], [place] [address]. All rights reserved. This information is not intended as a substitute for professional medical care. Always follow your healthcare professional's instructions.        Treating Gout Attacks  Gout is a disease that affects the joints. Left untreated, it can lead to painful foot deformity and even kidney problems. But, by treating gout early, you can relieve pain and help prevent future problems. Gout can usually be treated with medication and proper diet. In severe cases, surgery may be needed.  Gout attacks are painful and often happen more than once.  Taking medications may reduce pain and prevent attacks in the future. There are also some things you can do at home to relieve symptoms.    Medications for Gout  Your health care provider may prescribe a daily medication to reduce levels of uric acid. This may help prevent gout attacks. Other medications can help relieve pain and swelling during an attack. Be sure to take your medication as directed.  What You Can Do  Below are some things you can do at home to relieve gout symptoms. Your health care provider may have other tips.    Rest the painful joint as much as you can.    Raise the painful joint so it is at a level higher than your heart.  How Can I Prevent Gout?  With a little effort, you may be able to prevent gout attacks in the future. Here are some things you can do:    Avoid alcohol and foods that trigger gout.    Avoid foods high in purines    Certain meats (red meat, processed meat, turkey)    Organ meats (kidney, liver, sweetbread)    Shellfish (lobster, crab, shrimp, scallop, mussel)    Certain fish (anchovy, sardine, herring, mackerel)    Take any medications prescribed by your health care provider.    Lose weight if you need to.    Control blood pressure and cholesterol.    Drink plenty of water to help flush uric acid from your body.    4926-7068 [employer]. [address] [place] [address], [place] [address]. All rights reserved. This information is not intended as a substitute for professional medical care. Always follow your healthcare professional's instructions.                Follow-ups after your visit        Follow-up notes from your care team     Return in about 3 months (around 9/7/2017) for full physical, blood pressure check, lab tests.      Who to contact     If you have questions or need follow up information about today's clinic visit or your schedule please contact [facility] directly at [phone].  Normal or non-critical lab and imaging results will be  "communicated to you by ShopExhart, letter or phone within 4 business days after the clinic has received the results. If you do not hear from us within 7 days, please contact the clinic through Mydeo or phone. If you have a critical or abnormal lab result, we will notify you by phone as soon as possible.  Submit refill requests through Mydeo or call your pharmacy and they will forward the refill request to us. Please allow 3 business days for your refill to be completed.          Additional Information About Your Visit        Mydeo Information     Mydeo lets you send messages to your doctor, view your test results, renew your prescriptions, schedule appointments and more. To sign up, go to [email or web address] . Click on \"Log in\" on the left side of the screen, which will take you to the Welcome page. Then click on \"Sign up Now\" on the right side of the page.     You will be asked to enter the access code listed below, as well as some personal information. Please follow the directions to create your username and password.     Your access code is: [email or web address]  Expires: 2017 12:10 PM     Your access code will  in 90 days. If you need help or a new code, please call your [place] clinic or [phone].        Care EveryWhere ID     This is your Care EveryWhere ID. This could be used by other organizations to access your [place] medical records  [id number]        Your Vitals Were     Pulse Temperature Height Pulse Oximetry BMI (Body Mass Index)       64 98.3  F (36.8  C) (Oral) 1.715 m (5' 7.5\") 94% 31.79 kg/m2        Blood Pressure from Last 3 Encounters:   17 138/83   17 132/82   17 132/79    Weight from Last 3 Encounters:   17 93.4 kg (206 lb)   17 90.8 kg (200 lb 3.2 oz)   17 92.4 kg (203 lb 12.8 oz)              We Performed the Following     Uric acid          Today's Medication Changes          These changes are accurate as of: 17 12:11 PM.  If you " have any questions, ask your nurse or doctor.               Start taking these medicines.        Dose/Directions    indomethacin 50 MG capsule   Commonly known as:  INDOCIN   Used for:  Pain and swelling of right ankle   Started by:  Ant Wright MD        Dose:  50 mg   Take 1 capsule (50 mg) by mouth 3 times daily (with meals) As needed for joint pain.   Quantity:  60 capsule   Refills:  0            Where to get your medicines      These medications were sent to [id number] - [facility], [place] - [address] AT [facility] [address], [facility] [address]     Phone:  [phone]     indomethacin 50 MG capsule    lisinopril 10 MG tablet                Primary Care Provider Office Phone # Fax #    Ant Wright [phone]       [facility] [address]  [facility] [address]        Thank you!     Thank you for choosing [facility]  for your care. Our goal is always to provide you with excellent care. Hearing back from our patients is one way we can continue to improve our services. Please take a few minutes to complete the written survey that you may receive in the mail after your visit with us. Thank you!             Your Updated Medication List - Protect others around you: Learn how to safely use, store and throw away your medicines at www.disposemymeds.org.          This list is accurate as of: 5/25/17 12:11 PM.  Always use your most recent med list.                   Brand Name Dispense Instructions for use    aspirin 81 MG tablet      1 TABLET DAILY*       atorvastatin 40 MG tablet    LIPITOR    90 tablet    TAKE 1 TABLET BY MOUTH EVERY DAY FOR CHOLESTEROL       fish oil-omega-3 fatty acids 1000 MG capsule      Take 1 capsule by mouth Every other day.       indomethacin 50 MG capsule    INDOCIN    60 capsule    Take 1 capsule (50 mg) by mouth 3 times daily (with meals) As needed for joint pain.        lisinopril 10 MG tablet    PRINIVIL/ZESTRIL    90 tablet    Take 1 tablet (10 mg) by mouth daily for blood pressure.       vitamin D 1000 UNITS capsule      Take 1 capsule by mouth daily

## 2017-05-25 NOTE — NURSING NOTE
"Chief Complaint   Patient presents with     Musculoskeletal Problem     right ankle       Initial /83 (BP Location: Left arm, Patient Position: Chair, Cuff Size: Adult Regular)  Pulse 64  Temp 98.3  F (36.8  C) (Oral)  Ht 5' 7.5\" (1.715 m)  Wt 206 lb (93.4 kg)  SpO2 94%  BMI 31.79 kg/m2 Estimated body mass index is 31.79 kg/(m^2) as calculated from the following:    Height as of this encounter: 5' 7.5\" (1.715 m).    Weight as of this encounter: 206 lb (93.4 kg).  Medication Reconciliation: complete     Pa Cornelia Jin MA      "

## 2017-05-25 NOTE — Clinical Note
Please abstract the following data from this visit with this patient into the appropriate field in Epic:  Please link the abdominal aortic ultrasound 9/15/10 & 9/30/14 so that it shows up in Health Maintenance section.

## 2017-06-01 NOTE — TELEPHONE ENCOUNTER
This writer attempted to contact Florencio on 06/01/17      Reason for call inform message below and left detailed message.      When patient calls back, please Relay message, (read verbatim), document that pt called and close encounter.          Anahi Swanson MA

## 2017-07-31 ENCOUNTER — OFFICE VISIT (OUTPATIENT)
Dept: FAMILY MEDICINE | Facility: CLINIC | Age: 67
End: 2017-07-31
Payer: MEDICARE

## 2017-07-31 VITALS
WEIGHT: 203 LBS | DIASTOLIC BLOOD PRESSURE: 83 MMHG | OXYGEN SATURATION: 98 % | HEIGHT: 68 IN | SYSTOLIC BLOOD PRESSURE: 134 MMHG | TEMPERATURE: 98.3 F | HEART RATE: 60 BPM | BODY MASS INDEX: 30.77 KG/M2

## 2017-07-31 DIAGNOSIS — I10 ESSENTIAL HYPERTENSION WITH GOAL BLOOD PRESSURE LESS THAN 140/90: ICD-10-CM

## 2017-07-31 DIAGNOSIS — L08.9 INFECTED SEBACEOUS CYST: Primary | ICD-10-CM

## 2017-07-31 DIAGNOSIS — I70.202 ATHEROSCLEROSIS OF NATIVE ARTERY OF LEFT LOWER EXTREMITY, WITH UNSPECIFIED PRESENCE OF CLINICAL MANIFESTATION (H): ICD-10-CM

## 2017-07-31 DIAGNOSIS — I70.0 ATHEROSCLEROSIS OF AORTA (H): ICD-10-CM

## 2017-07-31 DIAGNOSIS — L72.3 INFECTED SEBACEOUS CYST: Primary | ICD-10-CM

## 2017-07-31 PROCEDURE — 10060 I&D ABSCESS SIMPLE/SINGLE: CPT | Performed by: FAMILY MEDICINE

## 2017-07-31 PROCEDURE — 99213 OFFICE O/P EST LOW 20 MIN: CPT | Mod: 25 | Performed by: FAMILY MEDICINE

## 2017-07-31 PROCEDURE — 87070 CULTURE OTHR SPECIMN AEROBIC: CPT | Performed by: FAMILY MEDICINE

## 2017-07-31 RX ORDER — CEPHALEXIN 500 MG/1
500 CAPSULE ORAL 3 TIMES DAILY
Qty: 21 CAPSULE | Refills: 0 | Status: SHIPPED | OUTPATIENT
Start: 2017-07-31 | End: 2017-08-07

## 2017-07-31 RX ORDER — ATORVASTATIN CALCIUM 40 MG/1
40 TABLET, FILM COATED ORAL DAILY
Qty: 90 TABLET | Refills: 1 | Status: SHIPPED | OUTPATIENT
Start: 2017-07-31 | End: 2017-09-12

## 2017-07-31 RX ORDER — LISINOPRIL 10 MG/1
10 TABLET ORAL DAILY
Qty: 90 TABLET | Refills: 0 | Status: SHIPPED | OUTPATIENT
Start: 2017-07-31 | End: 2017-09-12

## 2017-07-31 ASSESSMENT — PAIN SCALES - GENERAL: PAINLEVEL: NO PAIN (0)

## 2017-07-31 NOTE — LETTER
August 3, 2017      Florencio Doshi  [address]  [place] [address]              Dear Florencio Doshi      Your skin culture grew out bacteria that should be sensitive to the antibiotic you have been given.       Enclosed is a copy of the results.  It was a pleasure to see you at your last appointment.    If you have any questions or concerns, please call myself or my nurse at [phone].      Sincerely,      Ant Wright MD/JULIA. MONIKA Swanson  [employer]      Results for orders placed or performed in visit on 17   Skin Culture Aerobic Bacterial   Result Value Ref Range    Specimen Description Forehead Right     Special Requests Specimen collected in eSwab transport (white cap)     Culture Micro Heavy growth Normal skin devora     Micro Report Status FINAL 2017                  RE: Florencio Doshi   MRN: [id number]  : 1950  ENC DATE: 2017

## 2017-07-31 NOTE — PATIENT INSTRUCTIONS
================================================================================  Normal Values   Blood pressure  <140/90 for most adults    <130/80 for some chronic diseases (ask your care team about yours)    BMI (body mass index)  18.5-25 kg/m2 (based on height and weight)     Thank you for visiting [facility]    Normal or non-critical lab and imaging results will be communicated to you by MyChart, letter or phone within 7 days.  If you do not hear from us within 10 days, please call the clinic. If you have a critical or abnormal lab result, we will notify you by phone as soon as possible.     If you have any questions regarding your visit please contact:     Team Comfort:   Clinic Hours Telephone Number   Dr. Ant Bolden 7am-5pm  Monday - Friday [phone]  Vanna Randolph RN   Pharmacy 8:00am-8pm Monday-Friday    9am-5pm Saturday-Sunday [phone]   Urgent Care 11am-9pm Monday-Friday        9am-5pm Saturday-Sunday [phone]     After hours, weekend or if you need to make an appointment with your primary provider please call [phone].   After Hours nurse advise: call [facility]: [phone]    Medication Refills:  Call your pharmacy and they will forward the refill to us. Please allow 3 business days for your refills to be completed.

## 2017-07-31 NOTE — NURSING NOTE
"Chief Complaint   Patient presents with     Mass     Lump/Bump       Initial /83 (BP Location: Left arm, Patient Position: Chair, Cuff Size: Adult Large)  Pulse 60  Temp 98.3  F (36.8  C) (Oral)  Ht 5' 7.5\" (1.715 m)  Wt 203 lb (92.1 kg)  SpO2 98%  BMI 31.33 kg/m2 Estimated body mass index is 31.33 kg/(m^2) as calculated from the following:    Height as of this encounter: 5' 7.5\" (1.715 m).    Weight as of this encounter: 203 lb (92.1 kg).  Medication Reconciliation: complete   NOVA Swanson MA      "

## 2017-07-31 NOTE — MR AVS SNAPSHOT
After Visit Summary   7/31/2017    Florencio Doshi    MRN: [id number]           Patient Information     Date Of Birth          1950        Visit Information        Provider Department      7/31/2017 9:20 AM Ant Wright MD [facility]        Today's Diagnoses     Infected sebaceous cyst    -  1    Essential hypertension with goal blood pressure less than 140/90        Atherosclerosis of aorta (H)        Atherosclerosis of native artery of left lower extremity, with unspecified presence of clinical manifestation (H)          Care Instructions        ================================================================================  Normal Values   Blood pressure  <140/90 for most adults    <130/80 for some chronic diseases (ask your care team about yours)    BMI (body mass index)  18.5-25 kg/m2 (based on height and weight)     Thank you for visiting [facility]    Normal or non-critical lab and imaging results will be communicated to you by MyChart, letter or phone within 7 days.  If you do not hear from us within 10 days, please call the clinic. If you have a critical or abnormal lab result, we will notify you by phone as soon as possible.     If you have any questions regarding your visit please contact:     Team Comfort:   Clinic Hours Telephone Number   Dr. Ant Bolden 7am-5pm  Monday - Friday [phone]  Vanna Randolph RN   Pharmacy 8:00am-8pm Monday-Friday    9am-5pm Saturday-Sunday [phone]   Urgent Care 11am-9pm Monday-Friday        9am-5pm Saturday-Sunday [phone]     After hours, weekend or if you need to make an appointment with your primary provider please call [phone].   After Hours nurse advise: call [facility]: [phone]    Medication Refills:  Call your pharmacy and they will forward the refill to us. Please allow 3 business  "days for your refills to be completed.                  Follow-ups after your visit        Follow-up notes from your care team     Return in about 5 weeks (around 2017) for full physical, lab tests, blood pressure check.      Who to contact     If you have questions or need follow up information about today's clinic visit or your schedule please contact [facility] directly at [phone].  Normal or non-critical lab and imaging results will be communicated to you by [employer], letter or phone within 4 business days after the clinic has received the results. If you do not hear from us within 7 days, please contact the clinic through [employer] or phone. If you have a critical or abnormal lab result, we will notify you by phone as soon as possible.  Submit refill requests through [employer] or call your pharmacy and they will forward the refill request to us. Please allow 3 business days for your refill to be completed.          Additional Information About Your Visit        [employer] Information     [employer]sInk lets you send messages to your doctor, view your test results, renew your prescriptions, schedule appointments and more. To sign up, go to [email or web address] . Click on \"Log in\" on the left side of the screen, which will take you to the Welcome page. Then click on \"Sign up Now\" on the right side of the page.     You will be asked to enter the access code listed below, as well as some personal information. Please follow the directions to create your username and password.     Your access code is: [email or web address]  Expires: 2017 12:10 PM     Your access code will  in 90 days. If you need help or a new code, please call your [place] clinic or [phone].        Care EveryWhere ID     This is your Care EveryWhere ID. This could be used by other organizations to access your [place] medical records  [id number]        Your Vitals Were     Pulse Temperature Height Pulse Oximetry BMI (Body " "Mass Index)       60 98.3  F (36.8  C) (Oral) 5' 7.5\" (1.715 m) 98% 31.33 kg/m2        Blood Pressure from Last 3 Encounters:   07/31/17 134/83   05/25/17 138/83   03/06/17 132/82    Weight from Last 3 Encounters:   07/31/17 203 lb (92.1 kg)   05/25/17 206 lb (93.4 kg)   03/06/17 200 lb 3.2 oz (90.8 kg)              We Performed the Following     DRAIN SKIN ABSCESS SIMPLE/SINGLE     Skin Culture Aerobic Bacterial          Today's Medication Changes          These changes are accurate as of: 7/31/17 10:05 AM.  If you have any questions, ask your nurse or doctor.               Start taking these medicines.        Dose/Directions    cephALEXin 500 MG capsule   Commonly known as:  KEFLEX   Used for:  Infected sebaceous cyst   Started by:  Ant Wright MD        Dose:  500 mg   Take 1 capsule (500 mg) by mouth 3 times daily for 7 days   Quantity:  21 capsule   Refills:  0         These medicines have changed or have updated prescriptions.        Dose/Directions    atorvastatin 40 MG tablet   Commonly known as:  LIPITOR   This may have changed:    - how much to take  - how to take this  - when to take this  - additional instructions   Used for:  Atherosclerosis of aorta (H), Atherosclerosis of native artery of left lower extremity, with unspecified presence of clinical manifestation (H)   Changed by:  Ant Wright MD        Dose:  40 mg   Take 1 tablet (40 mg) by mouth daily for cholesterol.   Quantity:  90 tablet   Refills:  1            Where to get your medicines      These medications were sent to [id number] - [place], [place] - [address] AT [facility] [address], [facility] [address]     Phone:  [phone]     atorvastatin 40 MG tablet    cephALEXin 500 MG capsule    lisinopril 10 MG tablet                Primary Care Provider Office Phone # Fax #    Ant Wright [phone]       [facility] [address]  [place] " [facility] [address]        Equal Access to Services     [facility]: Hadii kathleen villegas gini Duval, waaxda luqadaha, qaybta kaalmada zaki, cara tirado. So [facility] [phone].    ATENCIÓN: Si habla español, tiene a marvin disposición servicios gratuitos de asistencia lingüística. Charleyame al [phone].    We comply with applicable federal civil rights laws and [place] laws. We do not discriminate on the basis of race, color, national origin, age, disability sex, sexual orientation or gender identity.            Thank you!     Thank you for choosing [facility]  for your care. Our goal is always to provide you with excellent care. Hearing back from our patients is one way we can continue to improve our services. Please take a few minutes to complete the written survey that you may receive in the mail after your visit with us. Thank you!             Your Updated Medication List - Protect others around you: Learn how to safely use, store and throw away your medicines at www.disposemymeds.org.          This list is accurate as of: 7/31/17 10:05 AM.  Always use your most recent med list.                   Brand Name Dispense Instructions for use Diagnosis    aspirin 81 MG tablet      1 TABLET DAILY*        atorvastatin 40 MG tablet    LIPITOR    90 tablet    Take 1 tablet (40 mg) by mouth daily for cholesterol.    Atherosclerosis of aorta (H), Atherosclerosis of native artery of left lower extremity, with unspecified presence of clinical manifestation (H)       cephALEXin 500 MG capsule    KEFLEX    21 capsule    Take 1 capsule (500 mg) by mouth 3 times daily for 7 days    Infected sebaceous cyst       diclofenac 50 MG EC tablet    VOLTAREN    60 tablet    Take 1 tablet (50 mg) by mouth 3 times daily as needed for joint pain. Take with food.    Pain and swelling of right ankle       fish oil-omega-3 fatty acids 1000 MG capsule      Take 1 capsule by mouth Every other day.         lisinopril 10 MG tablet    PRINIVIL/ZESTRIL    90 tablet    Take 1 tablet (10 mg) by mouth daily for blood pressure.    Essential hypertension with goal blood pressure less than 140/90       vitamin D 1000 UNITS capsule      Take 1 capsule by mouth daily

## 2017-07-31 NOTE — PROGRESS NOTES
"  SUBJECTIVE:                                                    Florencio Doshi is a 67 year old male who presents to clinic today for the following health issues:    Concern - Lump/Bump     When did it start?: 7/28/17    Any strain/injury, other illness, or event to trigger this problem?   no    Previous history of similar problem:   no      Location:           Right side of head    Describe it:   Swelling, red, started out looking like a pimple and got bigger over time      Severity: moderate      Progression of symptoms from onset until now:  worsening      Accompanying Signs & Symptoms:  Red,swelling & pain with touch    What have you noticed that tends to make this worse?     None      What have you noticed that tends to make this better?     touch      What have you done (this time) to try to treat this problem yourself?     Heat       Did it help?     no         Past medical, family, and social histories, medications, and allergies are reviewed and updated in Epic.     ROS:  C: NEGATIVE for fever, chills, change in weight  E/M: NEGATIVE for ear, mouth and throat problems  R: NEGATIVE for significant cough or SOB  CV: NEGATIVE for chest pain, palpitations or peripheral edema  ROS otherwise negative    Any history above obtained by the Medical Assistant was reviewed by Dr. Ant Wright MD, and edited when necessary.    This document serves as a record of the services and decisions personally performed and made by Dr. Wright. It was created on his behalf by Annie Rose, a trained medical scribe. The creation of this document is based the provider's statements to the medical scribe.  Annie Rose July 31, 2017 9:39 AM     OBJECTIVE:                                                    /83 (BP Location: Left arm, Patient Position: Chair, Cuff Size: Adult Large)  Pulse 60  Temp 98.3  F (36.8  C) (Oral)  Ht 1.715 m (5' 7.5\")  Wt 92.1 kg (203 lb)  SpO2 98%  BMI 31.33 kg/m2   Body mass index is " 31.33 kg/(m^2).     GENERAL: healthy, alert and no distress  EYES: Eyes grossly normal to inspection, PERRL, EOMI, sclerae white and conjunctivae normal  MS: no gross musculoskeletal defects noted, no edema  SKIN: Has a 15 x 10 mm somewhat lobulated cystic lesion on the right side of the forehead near the temple and near the hairline. Has some surrounding erythema but it is not draining.  NEURO: Normal strength and tone, sensory exam grossly normal, mentation intact, oriented times 3 and cranial nerves 2-12 intact  PSYCH: mentation appears normal, affect normal/bright     Diagnostic Test Results:  none      ASSESSMENT/PLAN:                                                      (L72.3,  L08.9) Infected sebaceous cyst  (primary encounter diagnosis)  Comment:   Plan: DRAIN SKIN ABSCESS SIMPLE/SINGLE, Skin Culture         Aerobic Bacterial, cephALEXin (KEFLEX) 500 MG         capsule        The cyst is cleansed with alcohol, anesthetized with Lidocaine 1% with epinephrine, and prepped with iodine solution. The cyst is incised using a 15 blade scalpel, and purulent sebaceous material was expressed. Antibiotic ointment and bandage applied. Wound care discussed with the patient during the procedure. He tolerated the procedure well. Follow up as needed.     (I10) Essential hypertension with goal blood pressure less than 140/90  Comment: Well controlled.  Plan: lisinopril (PRINIVIL/ZESTRIL) 10 MG tablet        Follow up in 5 weeks at [facility].    The information in this document, created by the medical scribe for me, accurately reflects the services I personally performed and the decisions made by me. I have reviewed and approved this document for accuracy prior to leaving the patient care area. July 31, 2017 9:39 AM     Ant Wright MD

## 2017-08-02 LAB
BACTERIA SPEC CULT: NORMAL
Lab: NORMAL
MICRO REPORT STATUS: NORMAL
SPECIMEN SOURCE: NORMAL

## 2017-09-12 ENCOUNTER — OFFICE VISIT (OUTPATIENT)
Dept: FAMILY MEDICINE | Facility: CLINIC | Age: 67
End: 2017-09-12
Payer: MEDICARE

## 2017-09-12 VITALS
TEMPERATURE: 97.5 F | OXYGEN SATURATION: 95 % | HEIGHT: 68 IN | DIASTOLIC BLOOD PRESSURE: 87 MMHG | WEIGHT: 201 LBS | SYSTOLIC BLOOD PRESSURE: 132 MMHG | HEART RATE: 60 BPM | BODY MASS INDEX: 30.46 KG/M2

## 2017-09-12 DIAGNOSIS — Z23 NEED FOR PROPHYLACTIC VACCINATION AND INOCULATION AGAINST INFLUENZA: ICD-10-CM

## 2017-09-12 DIAGNOSIS — Z12.11 SCREEN FOR COLON CANCER: ICD-10-CM

## 2017-09-12 DIAGNOSIS — Z00.00 ENCOUNTER FOR ROUTINE ADULT HEALTH EXAMINATION WITHOUT ABNORMAL FINDINGS: Primary | ICD-10-CM

## 2017-09-12 DIAGNOSIS — I70.202 ATHEROSCLEROSIS OF NATIVE ARTERY OF LEFT LOWER EXTREMITY, WITH UNSPECIFIED PRESENCE OF CLINICAL MANIFESTATION (H): ICD-10-CM

## 2017-09-12 DIAGNOSIS — I10 ESSENTIAL HYPERTENSION WITH GOAL BLOOD PRESSURE LESS THAN 140/90: ICD-10-CM

## 2017-09-12 DIAGNOSIS — N52.9 ERECTILE DYSFUNCTION, UNSPECIFIED ERECTILE DYSFUNCTION TYPE: ICD-10-CM

## 2017-09-12 DIAGNOSIS — Z23 NEED FOR PROPHYLACTIC VACCINATION AGAINST STREPTOCOCCUS PNEUMONIAE (PNEUMOCOCCUS): ICD-10-CM

## 2017-09-12 DIAGNOSIS — I70.0 ATHEROSCLEROSIS OF AORTA (H): ICD-10-CM

## 2017-09-12 LAB
ALT SERPL W P-5'-P-CCNC: 46 U/L (ref 0–70)
CHOLEST SERPL-MCNC: 103 MG/DL
CREAT SERPL-MCNC: 0.95 MG/DL (ref 0.66–1.25)
GFR SERPL CREATININE-BSD FRML MDRD: 79 ML/MIN/1.7M2
HDLC SERPL-MCNC: 47 MG/DL
LDLC SERPL CALC-MCNC: 35 MG/DL
NONHDLC SERPL-MCNC: 56 MG/DL
POTASSIUM SERPL-SCNC: 4.2 MMOL/L (ref 3.4–5.3)
TRIGL SERPL-MCNC: 104 MG/DL

## 2017-09-12 PROCEDURE — G0009 ADMIN PNEUMOCOCCAL VACCINE: HCPCS | Performed by: FAMILY MEDICINE

## 2017-09-12 PROCEDURE — G0008 ADMIN INFLUENZA VIRUS VAC: HCPCS | Performed by: FAMILY MEDICINE

## 2017-09-12 PROCEDURE — 90732 PPSV23 VACC 2 YRS+ SUBQ/IM: CPT | Performed by: FAMILY MEDICINE

## 2017-09-12 PROCEDURE — 82565 ASSAY OF CREATININE: CPT | Performed by: FAMILY MEDICINE

## 2017-09-12 PROCEDURE — 84460 ALANINE AMINO (ALT) (SGPT): CPT | Performed by: FAMILY MEDICINE

## 2017-09-12 PROCEDURE — 80061 LIPID PANEL: CPT | Performed by: FAMILY MEDICINE

## 2017-09-12 PROCEDURE — 36415 COLL VENOUS BLD VENIPUNCTURE: CPT | Performed by: FAMILY MEDICINE

## 2017-09-12 PROCEDURE — 90662 IIV NO PRSV INCREASED AG IM: CPT | Performed by: FAMILY MEDICINE

## 2017-09-12 PROCEDURE — G0439 PPPS, SUBSEQ VISIT: HCPCS | Performed by: FAMILY MEDICINE

## 2017-09-12 PROCEDURE — 84132 ASSAY OF SERUM POTASSIUM: CPT | Performed by: FAMILY MEDICINE

## 2017-09-12 RX ORDER — ATORVASTATIN CALCIUM 40 MG/1
40 TABLET, FILM COATED ORAL DAILY
Qty: 90 TABLET | Refills: 1 | Status: SHIPPED | OUTPATIENT
Start: 2017-09-12 | End: 2018-03-22

## 2017-09-12 RX ORDER — LISINOPRIL 10 MG/1
10 TABLET ORAL DAILY
Qty: 90 TABLET | Refills: 0 | Status: SHIPPED | OUTPATIENT
Start: 2017-09-12 | End: 2018-03-22

## 2017-09-12 RX ORDER — SILDENAFIL CITRATE 20 MG/1
20-60 TABLET ORAL DAILY PRN
Qty: 50 TABLET | Refills: 0 | Status: SHIPPED | OUTPATIENT
Start: 2017-09-12 | End: 2018-09-18

## 2017-09-12 NOTE — PROGRESS NOTES
SUBJECTIVE:   Florencio Doshi is a 67 year old male who presents for Preventive Visit.    Are you in the first 12 months of your Medicare Part B coverage?  No    Healthy Habits:    Do you get at least three servings of calcium containing foods daily (dairy, green leafy vegetables, etc.)? yes    Amount of exercise or daily activities, outside of work: 4-5 day(s) per week    Problems taking medications regularly No    Medication side effects: No    Have you had an eye exam in the past two years? no    Do you see a dentist twice per year? yes    Do you have sleep apnea, excessive snoring or daytime drowsiness?yes drowsiness    COGNITIVE SCREEN  1) Repeat 3 items (Banana, Sunrise, Chair)   2) Clock draw: NORMAL  3) 3 item recall: Recalls 3 objects  Results: 3 items recalled: COGNITIVE IMPAIRMENT LESS LIKELY    Mini-CogTM Copyright S Merrick. Licensed by the author for use in [facility]; reprinted with permission (stephen@[facility]). All rights reserved.      -------------------------------------    Reviewed and updated as needed this visit by clinical staff         Reviewed and updated as needed this visit by Provider      Social History   Substance Use Topics     Smoking status: Never Smoker     Smokeless tobacco: Never Used     Alcohol use Yes      Comment: occassionally       The patient does not drink >3 drinks per day nor >7 drinks per week.    Today's PHQ-2 Score:   PHQ-2 ( 1999 [employer]) 5/25/2017 3/6/2017   Q1: Little interest or pleasure in doing things 0 0   Q2: Feeling down, depressed or hopeless 0 0   PHQ-2 Score 0 0       Do you feel safe in your environment - Yes    Do you have a Health Care Directive?: No: Advance care planning was reviewed with patient; patient declined at this time.      Current providers sharing in care for this patient include: Patient Care Team:  Ant Wright MD as PCP - General  Ant Wright MD as MD (Family Practice)  Lilly Vargas NP as Nurse Practitioner  "(Nurse Practitioner)      Hearing impairment: No    Ability to successfully perform activities of daily living: Yes, no assistance needed     Fall risk:  Fallen 2 or more times in the past year?: No  Any fall with injury in the past year?: No      Home safety:  none identified      The following health maintenance items are reviewed in Epic and correct as of today:Health Maintenance   Topic Date Due     AORTIC ANEURYSM SCREENING (SYSTEM ASSIGNED)  03/23/2016     INFLUENZA VACCINE (SYSTEM ASSIGNED)  09/01/2017     PNEUMOCOCCAL (2 of 2 - PPSV23) 09/06/2017     COLON CANCER SCREEN (SYSTEM ASSIGNED)  09/10/2017     LIPID MONITORING Q1 YEAR  02/20/2018     FALL RISK ASSESSMENT  05/25/2018     ADVANCE DIRECTIVE PLANNING Q5 YRS  09/08/2021     TETANUS IMMUNIZATION (SYSTEM ASSIGNED)  06/19/2022     HEPATITIS C SCREENING  Completed     Pt also mentions ED symptoms for about a year, and is interested in trying a medication for this.  Additionally, he notes not needing diclofenac and not taking this medication often.     Past medical, family, and social histories, medications, and allergies are reviewed and updated in Epic.     ROS:  Constitutional, HEENT, cardiovascular, pulmonary, GI, , musculoskeletal, neuro, skin, endocrine and psych systems are negative, except as otherwise noted.    Any history above obtained by the Medical Assistant was reviewed by Dr. Ant Wright MD, and edited when necessary.    This document serves as a record of the services and decisions personally performed and made by Dr. Wright. It was created on his behalf by Annie Rose, a trained medical scribe. The creation of this document is based the provider's statements to the medical scribe.  Annie Rose September 12, 2017 8:52 AM      OBJECTIVE:   /87 (BP Location: Left arm, Patient Position: Chair, Cuff Size: Adult Regular)  Pulse 60  Temp 97.5  F (36.4  C) (Oral)  Ht 1.715 m (5' 7.5\")  Wt 91.2 kg (201 lb)  SpO2 95%  BMI " "31.02 kg/m2 Estimated body mass index is 31.33 kg/(m^2) as calculated from the following:    Height as of 7/31/17: 5' 7.5\" (1.715 m).    Weight as of 7/31/17: 203 lb (92.1 kg).    EXAM:  GENERAL: healthy, alert and no distress  EYES: Eyes grossly normal to inspection, PERRL and conjunctivae and sclerae normal  HENT: ear canals and TM's normal, nose and mouth without ulcers or lesions  NECK: no adenopathy, no asymmetry, masses, or scars and thyroid normal to palpation  RESP: lungs clear to auscultation, no crackles or wheezes, no areas of dullness, no tachypnea   CV: regular rate and rhythm, normal S1 S2, no S3 or S4, no murmur, click or rub, no peripheral edema and peripheral pulses strong  ABDOMEN: soft, nontender, no hepatosplenomegaly, no masses and bowel sounds normal   (male): normal male genitalia without lesions or urethral discharge, no hernia  MS: no gross musculoskeletal defects noted, no edema  SKIN: no suspicious lesions or rashes  NEURO: Normal strength and tone, mentation intact and speech normal, DTRs symmetrical, cranial nerves 2-12 intact   PSYCH: mentation appears normal, affect normal/bright     ASSESSMENT / PLAN:   (Z00.00) Encounter for routine adult health examination without abnormal findings  (primary encounter diagnosis)  Comment: Negative screening exam; up-to-date on preventive services after today.   Plan: Follow up in 1 year.    (I70.0) Atherosclerosis of aorta (H)  Comment: Aorta last imaged one year ago, and no aneurysm was present.   Plan: atorvastatin (LIPITOR) 40 MG tablet, ALT, Lipid        panel reflex to direct LDL   Periodic monitoring.    (I70.202) Atherosclerosis of native artery of left lower extremity, with unspecified presence of clinical manifestation (H)  Comment: Patient is fasting.   Plan: atorvastatin (LIPITOR) 40 MG tablet, ALT, Lipid        panel reflex to direct LDL       Follow up in 6 months.    (I10) Essential hypertension with goal blood pressure less than " "140/90  Comment: Well controlled  Plan: lisinopril (PRINIVIL/ZESTRIL) 10 MG tablet,         Potassium, Creatinine        Follow up in 6 months.    (N52.9) Erectile dysfunction, unspecified erectile dysfunction type  Comment: Paper script was given so he can price this at various pharmacies. No PA for this medication.  Plan: sildenafil (REVATIO/VIAGRA) 20 MG tablet            (Z12.11) Screen for colon cancer  Comment: Due  Plan: GASTROENTEROLOGY ADULT REF PROCEDURE ONLY            (Z23) Need for prophylactic vaccination and inoculation against influenza  Comment: Influenza vaccine offered and accepted by patient. He has received it before without problems.   Plan: HC FLU VACCINE, INCREASED ANTIGEN, PRESV FREE            (Z23) Need for prophylactic vaccination against Streptococcus pneumoniae (pneumococcus)  Comment:   Plan: ADMIN PNEUMOCOCCAL VACCINE, PNEUMOCOCCAL         VACCINE,ADULT,SQ OR IM             End of Life Planning:  Patient currently has an advanced directive: No.  I have verified the patient's ablity to prepare an advanced directive/make health care decisions.  Literature was provided to assist patient in preparing an advanced directive.    COUNSELING:  Reviewed preventive health counseling, as reflected in patient instructions  Special attention given to:       Regular exercise       Immunizations    Vaccinated for: Influenza and Pneumococcal         Colon cancer screening      Estimated body mass index is 31.33 kg/(m^2) as calculated from the following:    Height as of 7/31/17: 5' 7.5\" (1.715 m).    Weight as of 7/31/17: 203 lb (92.1 kg).  Weight management plan: Patient rides his bike and does other housework (mowing the lawn, etc.).      reports that he has never smoked. He has never used smokeless tobacco.      Appropriate preventive services were discussed with this patient, including applicable screening as appropriate for cardiovascular disease, diabetes, osteopenia/osteoporosis, and glaucoma.  " As appropriate for age/gender, discussed screening for colorectal cancer, prostate cancer, breast cancer, and cervical cancer. Checklist reviewing preventive services available has been given to the patient.    Reviewed patients plan of care and provided an AVS. The Basic Care Plan (routine screening as documented in Health Maintenance) for Florencio meets the Care Plan requirement. This Care Plan has been established and reviewed with the Patient.    Counseling Resources:  ATP IV Guidelines  Pooled Cohorts Equation Calculator  Breast Cancer Risk Calculator  FRAX Risk Assessment  ICSI Preventive Guidelines  Dietary Guidelines for Americans, 2010  USDA's MyPlate  ASA Prophylaxis  Lung CA Screening    The information in this document, created by the medical scribe for me, accurately reflects the services I personally performed and the decisions made by me. I have reviewed and approved this document for accuracy prior to leaving the patient care area. September 12, 2017 8:52 AM      Ant Wright MD  [facility]

## 2017-09-12 NOTE — NURSING NOTE
"Chief Complaint   Patient presents with     Physical       Initial /87 (BP Location: Left arm, Patient Position: Chair, Cuff Size: Adult Regular)  Pulse 60  Temp 97.5  F (36.4  C) (Oral)  Ht 5' 7.5\" (1.715 m)  Wt 201 lb (91.2 kg)  SpO2 95%  BMI 31.02 kg/m2 Estimated body mass index is 31.02 kg/(m^2) as calculated from the following:    Height as of this encounter: 5' 7.5\" (1.715 m).    Weight as of this encounter: 201 lb (91.2 kg).  Medication Reconciliation: complete     Geovanni Jin MA      "

## 2017-09-12 NOTE — PATIENT INSTRUCTIONS
Preventive Health Recommendations:   Male Ages 65 and over    Yearly exam:             See your health care provider every year in order to  o   Review health changes.   o   Discuss preventive care.    o   Review your medicines if your doctor has prescribed any.    Talk with your health care provider about whether you should have a test to screen for prostate cancer (PSA).    Every 3 years, have a diabetes test (fasting glucose). If you are at risk for diabetes, you should have this test more often.    Every 5 years, have a cholesterol test. Have this test more often if you are at risk for high cholesterol or heart disease.     Every 10 years, have a colonoscopy. Or, have a yearly FIT test (stool test). These exams will check for colon cancer.    Talk to with your health care provider about screening for Abdominal Aortic Aneurysm if you have a family history of AAA or have a history of smoking.    Shots:     Get a flu shot each year.     Get a tetanus shot every 10 years.     Talk to your doctor about your pneumonia vaccines. There are now two you should receive - Pneumovax (PPSV 23) and Prevnar (PCV 13).     Talk to your doctor about a shingles vaccine.     Talk to your doctor about the hepatitis B vaccine.  Nutrition:     Eat at least 5 servings of fruits and vegetables each day.     Eat whole-grain bread, whole-wheat pasta and brown rice instead of white grains and rice.     Talk to your provider about Calcium and Vitamin D.   Lifestyle    Exercise for at least 150 minutes a week (30 minutes a day, 5 days a week). This will help you control your weight and prevent disease.     Limit alcohol to one drink per day.     No smoking.     Wear sunscreen to prevent skin cancer.     See your dentist every six months for an exam and cleaning.     See your eye doctor every 1 to 2 years to screen for conditions such as glaucoma, macular degeneration, cataracts, etc     This summary includes the important diagnoses, test,  medications and other important parts of your medical history.  Below are a few good we sites you can use to learn more about these.     Www.Lang Ma.org : Up to date and easily searchable information on multiple topics.  Www.Lang Ma.org/Pharmacy/c_539084.asp : [facility] $4.99 medications  Www.medlineplus.gov : medication info, interactive tutorials, watch real surgeries online  Www.familydoctor.org : good info from the Academy of Family Physicians  Www.Verge Solutionsinic.com : good info from the [facility]  Www.cdc.gov : public health info, travel advisories, epidemics (H1N1)  Www.aap.org : children's health info, normal development, vaccinations  Www.health.[facility].[place].us : [place] dept of heat, public health issues in [place], N1N1    Based on your medical history and these are the current health maintenance or preventive care services that you are due for (some may have been done at this visit:)  Health Maintenance Due   Topic Date Due     AORTIC ANEURYSM SCREENING (SYSTEM ASSIGNED)  03/23/2016     INFLUENZA VACCINE (SYSTEM ASSIGNED)  09/01/2017     PNEUMOCOCCAL (2 of 2 - PPSV23) 09/06/2017     COLON CANCER SCREEN (SYSTEM ASSIGNED)  09/10/2017     =================================================================================  Normal Values   Blood pressure  <140/90 for most adults    <130/80 for some chronic diseases (ask your care team about yours)    BMI (body mass index)  18.5-25 kg/m2 (based on height and weight)     Thank you for visiting [facility]    Normal or non-critical lab and imaging results will be communicated to you by MyChart, letter or phone within 7 days.  If you do not hear from us within 10 days, please call the clinic. If you have a critical or abnormal lab result, we will notify you by phone as soon as possible.     If you have any questions regarding your visit please contact:     Team Comfort:   Clinic Hours Telephone Number   Dr. Ant Bianchi  Ismael Dorman  Tanika Catherine   7am-5pm  Monday - Friday [phone]  Bennie CORTEZ   Pharmacy 8am-8pm Monday-Thursday      8am-6pm Friday  9am-5pm Saturday-Sunday [phone]   Urgent Care 11am-8pm Monday-Friday        9am-5pm Saturday-Sunday [phone]     After hours, weekend or if you need to make an appointment with your primary provider please call [phone].   After Hours nurse advise: call [facility]: [phone]    Medication Refills:  Call your pharmacy and they will forward the refill to us. Please allow 3 business days for your refills to be completed.    Use DecisionView (secure email communication and access to your chart) to send your primary care provider a message or make an appointment. Ask someone on your Team how to sign up for DecisionView. To log on to Majitek or for more information in Maxymiser please visit the website at [email or web address].  As of October 8, 2013, all password changes, disabled accounts, or ID changes in DecisionView/MyHealth will be done by our Access Services Department.   If you need help with your account or password, call: [phone]. Clinic staff no longer has the ability to change passwords.

## 2017-09-12 NOTE — LETTER
September 12, 2017      Florencio Doshi  [address] [place] [address]      Dear ,    All of your labs were normal for you. Your lipid levels are better than usual.     Resulted Orders   ALT   Result Value Ref Range    ALT 46 0 - 70 U/L   Lipid panel reflex to direct LDL   Result Value Ref Range    Cholesterol 103 <200 mg/dL    Triglycerides 104 <150 mg/dL      Comment:      Fasting specimen    HDL Cholesterol 47 >39 mg/dL    LDL Cholesterol Calculated 35 <100 mg/dL      Comment:      Desirable:       <100 mg/dl    Non HDL Cholesterol 56 <130 mg/dL   Potassium   Result Value Ref Range    Potassium 4.2 3.4 - 5.3 mmol/L   Creatinine   Result Value Ref Range    Creatinine 0.95 0.66 - 1.25 mg/dL    GFR Estimate 79 >60 mL/min/1.7m2      Comment:      Non  GFR Calc    GFR Estimate If Black >90 >60 mL/min/1.7m2      Comment:       GFR Calc     Please contact the clinic if you have additional questions.  Thank you.     Sincerely,     Ant Wright MD/ofelia

## 2017-09-12 NOTE — PROGRESS NOTES
Injectable Influenza Immunization Documentation    1.  Are you sick today? (Fever of 100.5 or higher on the day of the clinic)   No    2.  Have you ever had Guillain-[place] Syndrome within 6 weeks of an influenza vaccionation?  No    3. Do you have a life-threatening allergy to eggs?  No    4. Do you have a life-threatening allergy to a component of the vaccine? May include antibiotics, gelatin or latex.  No     5. Have you ever had a reaction to a dose of flu vaccine that needed immediate medical attention?  No     Form completed by NOVA Swanson MA

## 2017-09-12 NOTE — MR AVS SNAPSHOT
After Visit Summary   9/12/2017    Florencio Doshi    MRN: [id number]           Patient Information     Date Of Birth          1950        Visit Information        Provider Department      9/12/2017 8:40 AM Ant Wright MD [facility]        Today's Diagnoses     Encounter for routine adult health examination without abnormal findings    -  1    Atherosclerosis of aorta (H)        Atherosclerosis of native artery of left lower extremity, with unspecified presence of clinical manifestation (H)        Essential hypertension with goal blood pressure less than 140/90        Erectile dysfunction, unspecified erectile dysfunction type        Screen for colon cancer        Need for prophylactic vaccination and inoculation against influenza        Need for prophylactic vaccination against Streptococcus pneumoniae (pneumococcus)          Care Instructions      Preventive Health Recommendations:   Male Ages 65 and over    Yearly exam:             See your health care provider every year in order to  o   Review health changes.   o   Discuss preventive care.    o   Review your medicines if your doctor has prescribed any.    Talk with your health care provider about whether you should have a test to screen for prostate cancer (PSA).    Every 3 years, have a diabetes test (fasting glucose). If you are at risk for diabetes, you should have this test more often.    Every 5 years, have a cholesterol test. Have this test more often if you are at risk for high cholesterol or heart disease.     Every 10 years, have a colonoscopy. Or, have a yearly FIT test (stool test). These exams will check for colon cancer.    Talk to with your health care provider about screening for Abdominal Aortic Aneurysm if you have a family history of AAA or have a history of smoking.    Shots:     Get a flu shot each year.     Get a tetanus shot every 10 years.     Talk to your doctor about your pneumonia vaccines.  There are now two you should receive - Pneumovax (PPSV 23) and Prevnar (PCV 13).     Talk to your doctor about a shingles vaccine.     Talk to your doctor about the hepatitis B vaccine.  Nutrition:     Eat at least 5 servings of fruits and vegetables each day.     Eat whole-grain bread, whole-wheat pasta and brown rice instead of white grains and rice.     Talk to your provider about Calcium and Vitamin D.   Lifestyle    Exercise for at least 150 minutes a week (30 minutes a day, 5 days a week). This will help you control your weight and prevent disease.     Limit alcohol to one drink per day.     No smoking.     Wear sunscreen to prevent skin cancer.     See your dentist every six months for an exam and cleaning.     See your eye doctor every 1 to 2 years to screen for conditions such as glaucoma, macular degeneration, cataracts, etc     This summary includes the important diagnoses, test, medications and other important parts of your medical history.  Below are a few good we sites you can use to learn more about these.     Www.Thinknum : Up to date and easily searchable information on multiple topics.  Www.Thinknum/Pharmacy/c_539084.asp : [facility] $4.99 medications  Www.PowerSecure International.gov : medication info, interactive tutorials, watch real surgeries online  Www.familydoctor.org : good info from the Academy of Family Physicians  Www.mayoMIOTtechinic.com : good info from the [facility]  Www.cdc.gov : public health info, travel advisories, epidemics (H1N1)  Www.aap.org : children's health info, normal development, vaccinations  Www.health.[facility].[place].us : [place] dept of heatlh, public health issues in [place], N1N1    Based on your medical history and these are the current health maintenance or preventive care services that you are due for (some may have been done at this visit:)  Health Maintenance Due   Topic Date Due     AORTIC ANEURYSM SCREENING (SYSTEM ASSIGNED)  03/23/2016     INFLUENZA VACCINE (SYSTEM  ASSIGNED)  09/01/2017     PNEUMOCOCCAL (2 of 2 - PPSV23) 09/06/2017     COLON CANCER SCREEN (SYSTEM ASSIGNED)  09/10/2017     =================================================================================  Normal Values   Blood pressure  <140/90 for most adults    <130/80 for some chronic diseases (ask your care team about yours)    BMI (body mass index)  18.5-25 kg/m2 (based on height and weight)     Thank you for visiting [facility]    Normal or non-critical lab and imaging results will be communicated to you by MyChart, letter or phone within 7 days.  If you do not hear from us within 10 days, please call the clinic. If you have a critical or abnormal lab result, we will notify you by phone as soon as possible.     If you have any questions regarding your visit please contact:     Team Comfort:   Clinic Hours Telephone Number   Dr. Ant Catherine   7am-5pm  Monday - Friday [phone]  Bennie CORTEZ   Pharmacy 8am-8pm Monday-Thursday      8am-6pm Friday  9am-5pm Saturday-Sunday [phone]   Urgent Care 11am-8pm Monday-Friday        9am-5pm Saturday-Sunday [phone]     After hours, weekend or if you need to make an appointment with your primary provider please call [phone].   After Hours nurse advise: call [facility]: [phone]    Medication Refills:  Call your pharmacy and they will forward the refill to us. Please allow 3 business days for your refills to be completed.    Use Esoko Networks (secure email communication and access to your chart) to send your primary care provider a message or make an appointment. Ask someone on your Team how to sign up for Esoko Networks. To log on to Countercepts or for more information in Zeo please visit the website at [email or web address].  As of October 8, 2013, all password changes, disabled accounts, or ID changes in Esoko Networks/MyHealth will be done by our  Access Services Department.   If you need help with your account or password, call: [phone]. Clinic staff no longer has the ability to change passwords.             Follow-ups after your visit        Additional Services     GASTROENTEROLOGY ADULT REF PROCEDURE ONLY       Last Lab Result: Creatinine (mg/dL)       Date                     Value                 02/20/2017               0.88             ----------  Body mass index is 31.02 kg/(m^2).     Needed:  No  Language:  English    Patient will be contacted to schedule the colonoscopy.     Please be aware that coverage of these services is subject to the terms and limitations of your health insurance plan.  Call member services at your health plan with any benefit or coverage questions.  Any procedures must be performed at a [facility] facility OR coordinated by your clinic's referral office.    Please bring the following with you to your appointment:    (1) Any X-Rays, CTs or MRIs which have been performed.  Contact the facility where they were done to arrange for  prior to your scheduled appointment.    (2) List of current medications   (3) This referral request   (4) Any documents/labs given to you for this referral                  Follow-up notes from your care team     Return in about 6 months (around 3/12/2018) for cholesterol, blood pressure check.      Who to contact     If you have questions or need follow up information about today's clinic visit or your schedule please contact [facility] directly at [phone].  Normal or non-critical lab and imaging results will be communicated to you by MyChart, letter or phone within 4 business days after the clinic has received the results. If you do not hear from us within 7 days, please contact the clinic through MyChart or phone. If you have a critical or abnormal lab result, we will notify you by phone as soon as possible.  Submit refill requests through Wholeshare or  "call your pharmacy and they will forward the refill request to us. Please allow 3 business days for your refill to be completed.          Additional Information About Your Visit        My[facility] Information     listedplaces lets you send messages to your doctor, view your test results, renew your prescriptions, schedule appointments and more. To sign up, go to [email or web address] . Click on \"Log in\" on the left side of the screen, which will take you to the Welcome page. Then click on \"Sign up Now\" on the right side of the page.     You will be asked to enter the access code listed below, as well as some personal information. Please follow the directions to create your username and password.     Your access code is: [email or web address]  Expires: 2017  9:25 AM     Your access code will  in 90 days. If you need help or a new code, please call your [facility] clinic or [phone].        Care EveryWhere ID     This is your Care EveryWhere ID. This could be used by other organizations to access your [facility] medical records  [id number]        Your Vitals Were     Pulse Temperature Height Pulse Oximetry BMI (Body Mass Index)       60 97.5  F (36.4  C) (Oral) 5' 7.5\" (1.715 m) 95% 31.02 kg/m2        Blood Pressure from Last 3 Encounters:   17 132/87   17 134/83   17 138/83    Weight from Last 3 Encounters:   17 201 lb (91.2 kg)   17 203 lb (92.1 kg)   17 206 lb (93.4 kg)              We Performed the Following     ADMIN PNEUMOCOCCAL VACCINE     ALT     Creatinine     GASTROENTEROLOGY ADULT REF PROCEDURE ONLY     HC FLU VACCINE, INCREASED ANTIGEN, PRESV FREE     Lipid panel reflex to direct LDL     PNEUMOCOCCAL VACCINE,ADULT,SQ OR IM     Potassium          Today's Medication Changes          These changes are accurate as of: 17  9:25 AM.  If you have any questions, ask your nurse or doctor.               Start taking these medicines.        Dose/Directions    sildenafil 20 MG " tablet   Commonly known as:  REVATIO/VIAGRA   Used for:  Erectile dysfunction, unspecified erectile dysfunction type   Started by:  Ant Wright MD        Dose:  20-60 mg   Take 1-3 tablets (20-60 mg) by mouth daily as needed , 1-3 hours before intimacy. Never use with nitroglycerin, terazosin or doxazosin.   Quantity:  50 tablet   Refills:  0            Where to get your medicines      These medications were sent to [id number] - [facility], [place] - [address] AT [facility] [address], [facility] [address]     Phone:  [phone]     atorvastatin 40 MG tablet    lisinopril 10 MG tablet         Some of these will need a paper prescription and others can be bought over the counter.  Ask your nurse if you have questions.     Bring a paper prescription for each of these medications     sildenafil 20 MG tablet                Primary Care Provider Office Phone # Fax #    Ant Wright [phone]       [address]  [facility] [address]        Equal Access to Services     [facility]: Hadii aad ku hadasho Soomaali, waaxda luqadaha, qaybta kaalmada adeegyada, waxay idiin hayaan adama kharaconchita meadows . So [facility] [phone].    ATENCIÓN: Si habla español, tiene a marvin disposición servicios gratcarlosos de asistencia lingüística. [facility] [phone].    We comply with applicable federal civil rights laws and [place] laws. We do not discriminate on the basis of race, color, national origin, age, disability sex, sexual orientation or gender identity.            Thank you!     Thank you for choosing [facility]  for your care. Our goal is always to provide you with excellent care. Hearing back from our patients is one way we can continue to improve our services. Please take a few minutes to complete the written survey that you may receive in the mail after your visit with us. Thank you!             Your Updated Medication List -  Protect others around you: Learn how to safely use, store and throw away your medicines at www.disposemymeds.org.          This list is accurate as of: 9/12/17  9:25 AM.  Always use your most recent med list.                   Brand Name Dispense Instructions for use Diagnosis    aspirin 81 MG tablet      1 TABLET DAILY*        atorvastatin 40 MG tablet    LIPITOR    90 tablet    Take 1 tablet (40 mg) by mouth daily for cholesterol.    Atherosclerosis of aorta (H), Atherosclerosis of native artery of left lower extremity, with unspecified presence of clinical manifestation (H)       diclofenac 50 MG EC tablet    VOLTAREN    60 tablet    Take 1 tablet (50 mg) by mouth 3 times daily as needed for joint pain. Take with food.    Pain and swelling of right ankle       fish oil-omega-3 fatty acids 1000 MG capsule      Take 1 capsule by mouth Every other day.        lisinopril 10 MG tablet    PRINIVIL/ZESTRIL    90 tablet    Take 1 tablet (10 mg) by mouth daily for blood pressure.    Essential hypertension with goal blood pressure less than 140/90       sildenafil 20 MG tablet    REVATIO/VIAGRA    50 tablet    Take 1-3 tablets (20-60 mg) by mouth daily as needed , 1-3 hours before intimacy. Never use with nitroglycerin, terazosin or doxazosin.    Erectile dysfunction, unspecified erectile dysfunction type       vitamin D 1000 UNITS capsule      Take 1 capsule by mouth daily

## 2017-09-12 NOTE — NURSING NOTE
Screening Questionnaire for Adult Immunization    Are you sick today?   No   Do you have allergies to medications, food, a vaccine component or latex?   Yes   Have you ever had a serious reaction after receiving a vaccination?   No   Do you have a long-term health problem with heart disease, lung disease, asthma, kidney disease, metabolic disease (e.g. diabetes), anemia, or other blood disorder?   No   Do you have cancer, leukemia, HIV/AIDS, or any other immune system problem?   No   In the past 3 months, have you taken medications that affect  your immune system, such as prednisone, other steroids, or anticancer drugs; drugs for the treatment of rheumatoid arthritis, Crohn s disease, or psoriasis; or have you had radiation treatments?   No   Have you had a seizure, or a brain or other nervous system problem?   No   During the past year, have you received a transfusion of blood or blood     products, or been given immune (gamma) globulin or antiviral drug?   No   For women: Are you pregnant or is there a chance you could become        pregnant during the next month?   No   Have you received any vaccinations in the past 4 weeks?   No     Immunization questionnaire was positive for at least one answer.  Notified PROVIDER.        Patient instructed to remain in clinic for 15 minutes afterwards, and to report any adverse reaction to me immediately.       Screening performed by Anahi Swanson on 9/12/2017 at 9:35 AM.

## 2017-10-19 ENCOUNTER — SURGERY (OUTPATIENT)
Age: 67
End: 2017-10-19

## 2017-10-19 ENCOUNTER — HOSPITAL ENCOUNTER (OUTPATIENT)
Facility: AMBULATORY SURGERY CENTER | Age: 67
Discharge: HOME | End: 2017-10-19
Attending: SURGERY | Admitting: SURGERY
Payer: MEDICARE

## 2017-10-19 VITALS
DIASTOLIC BLOOD PRESSURE: 91 MMHG | SYSTOLIC BLOOD PRESSURE: 129 MMHG | OXYGEN SATURATION: 94 % | RESPIRATION RATE: 16 BRPM | TEMPERATURE: 96.8 F

## 2017-10-19 LAB — COLONOSCOPY: NORMAL

## 2017-10-19 PROCEDURE — 45385 COLONOSCOPY W/LESION REMOVAL: CPT | Mod: PT

## 2017-10-19 PROCEDURE — 45385 COLONOSCOPY W/LESION REMOVAL: CPT | Mod: PT | Performed by: SURGERY

## 2017-10-19 PROCEDURE — G8907 PT DOC NO EVENTS ON DISCHARG: HCPCS

## 2017-10-19 PROCEDURE — G0500 MOD SEDAT ENDO SERVICE >5YRS: HCPCS | Performed by: SURGERY

## 2017-10-19 PROCEDURE — G8918 PT W/O PREOP ORDER IV AB PRO: HCPCS

## 2017-10-19 RX ORDER — LIDOCAINE 40 MG/G
CREAM TOPICAL
Status: DISCONTINUED | OUTPATIENT
Start: 2017-10-19 | End: 2017-10-20 | Stop reason: HOSPADM

## 2017-10-19 RX ORDER — FENTANYL CITRATE 50 UG/ML
INJECTION, SOLUTION INTRAMUSCULAR; INTRAVENOUS PRN
Status: DISCONTINUED | OUTPATIENT
Start: 2017-10-19 | End: 2017-10-19 | Stop reason: HOSPADM

## 2017-10-19 RX ADMIN — FENTANYL CITRATE 50 MCG: 50 INJECTION, SOLUTION INTRAMUSCULAR; INTRAVENOUS at 12:01

## 2017-10-19 RX ADMIN — FENTANYL CITRATE 100 MCG: 50 INJECTION, SOLUTION INTRAMUSCULAR; INTRAVENOUS at 11:54

## 2017-10-19 NOTE — LETTER
[facility]           [address] [place] [address]           Tel [phone]  Florencio Doshi  [address] [place] [address]      October 24, 2017    Dear Florencio,  This letter is to inform you of the results of your pathology report on your colonoscopy.  If you have questions please feel free to call my assistant  At [phone] .    Your pathology report was:  Showed an Adenomatous polyp. This is a benign (not cancerous) growth; however these can become cancer over time. This polyp is usually removed completely at the time of the biopsy. Because it is an Adenomatous polyp you do have a slight higher risk for colon cancer. This is why you will need a repeat colonoscopy in approximately 5 years.  If you do have further questions please don t hesitate to call my assistant at  .  We do not have someone answering the phone all the time at my assistants number so if leave a message may take a day or so to get back to you.  So if more urgent then call the below number.    To make an appointment call [phone]: .   Sincerely,     Tristan Tipton M.D.  ___

## 2017-10-24 LAB — COPATH REPORT: NORMAL

## 2018-03-22 ENCOUNTER — OFFICE VISIT (OUTPATIENT)
Dept: FAMILY MEDICINE | Facility: CLINIC | Age: 68
End: 2018-03-22
Payer: MEDICARE

## 2018-03-22 VITALS
OXYGEN SATURATION: 95 % | WEIGHT: 207 LBS | HEIGHT: 68 IN | BODY MASS INDEX: 31.37 KG/M2 | SYSTOLIC BLOOD PRESSURE: 136 MMHG | HEART RATE: 64 BPM | DIASTOLIC BLOOD PRESSURE: 84 MMHG | TEMPERATURE: 98 F

## 2018-03-22 DIAGNOSIS — L98.9 SKIN LESION OF SCALP: ICD-10-CM

## 2018-03-22 DIAGNOSIS — I70.202 ATHEROSCLEROSIS OF NATIVE ARTERY OF LEFT LOWER EXTREMITY, WITH UNSPECIFIED PRESENCE OF CLINICAL MANIFESTATION (H): Primary | ICD-10-CM

## 2018-03-22 DIAGNOSIS — I70.0 ATHEROSCLEROSIS OF AORTA (H): ICD-10-CM

## 2018-03-22 DIAGNOSIS — I10 ESSENTIAL HYPERTENSION WITH GOAL BLOOD PRESSURE LESS THAN 140/90: ICD-10-CM

## 2018-03-22 DIAGNOSIS — M67.472 GANGLION CYST OF LEFT FOOT: ICD-10-CM

## 2018-03-22 LAB
ALT SERPL W P-5'-P-CCNC: 59 U/L (ref 0–70)
CHOLEST SERPL-MCNC: 102 MG/DL
CREAT SERPL-MCNC: 0.99 MG/DL (ref 0.66–1.25)
GFR SERPL CREATININE-BSD FRML MDRD: 76 ML/MIN/1.7M2
HDLC SERPL-MCNC: 46 MG/DL
LDLC SERPL CALC-MCNC: 29 MG/DL
NONHDLC SERPL-MCNC: 56 MG/DL
POTASSIUM SERPL-SCNC: 4.4 MMOL/L (ref 3.4–5.3)
TRIGL SERPL-MCNC: 135 MG/DL

## 2018-03-22 PROCEDURE — 84132 ASSAY OF SERUM POTASSIUM: CPT | Performed by: FAMILY MEDICINE

## 2018-03-22 PROCEDURE — 80061 LIPID PANEL: CPT | Performed by: FAMILY MEDICINE

## 2018-03-22 PROCEDURE — 82565 ASSAY OF CREATININE: CPT | Performed by: FAMILY MEDICINE

## 2018-03-22 PROCEDURE — 99214 OFFICE O/P EST MOD 30 MIN: CPT | Performed by: FAMILY MEDICINE

## 2018-03-22 PROCEDURE — 36415 COLL VENOUS BLD VENIPUNCTURE: CPT | Performed by: FAMILY MEDICINE

## 2018-03-22 PROCEDURE — 84460 ALANINE AMINO (ALT) (SGPT): CPT | Performed by: FAMILY MEDICINE

## 2018-03-22 RX ORDER — LISINOPRIL 10 MG/1
10 TABLET ORAL DAILY
Qty: 90 TABLET | Refills: 1 | Status: SHIPPED | OUTPATIENT
Start: 2018-03-22 | End: 2018-09-18

## 2018-03-22 RX ORDER — ATORVASTATIN CALCIUM 40 MG/1
40 TABLET, FILM COATED ORAL DAILY
Qty: 90 TABLET | Refills: 1 | Status: SHIPPED | OUTPATIENT
Start: 2018-03-22 | End: 2018-09-18

## 2018-03-22 ASSESSMENT — PAIN SCALES - GENERAL: PAINLEVEL: NO PAIN (0)

## 2018-03-22 NOTE — PROGRESS NOTES
"  SUBJECTIVE:   Florencio Doshi is a 67 year old male who presents to clinic today for the following health issues:      Hyperlipidemia Follow-Up      Rate your low fat/cholesterol diet?: fair    Taking statin?  Yes, no muscle aches from statin    Other lipid medications/supplements?:  Fish oil/Omega 3, dose  without side effects    Hypertension Follow-up      Outpatient blood pressures are not being checked.    Low Salt Diet: no added salt      Amount of exercise or physical activity: 4 days/week for an average of 30-45 minutes    Problems taking medications regularly: No    Medication side effects: none    Diet: low fat/cholesterol      Past medical, family, and social histories, medications, and allergies are reviewed and updated in Epic.     ROS:  CONSTITUTIONAL: NEGATIVE for fever, chills, change in weight  I: He notes a lesion on his head that he would like removed - it has been present for awhile (a few months) but he thinks it is growing. The spot also itches. A similar lesion was on his temple a few years ago and he had it removed in clinic.  MS: He also mentions that his left big toe is painful and he thinks it looks a bit bigger compared to his other big toe. He took diclofenac for this issue.   ENT/MOUTH: NEGATIVE for ear, mouth and throat problems  RESP: NEGATIVE for significant cough or SOB  CV: NEGATIVE for chest pain, palpitations or peripheral edema  ROS otherwise negative    This document serves as a record of the services and decisions personally performed and made by Dr. Wright. It was created on his behalf by Annie Rose, a trained medical scribe. The creation of this document is based the provider's statements to the medical scribe.  Annie Rose March 22, 2018 10:17 AM     OBJECTIVE:                                                    /84  Pulse 64  Temp 98  F (36.7  C) (Oral)  Ht 1.715 m (5' 7.5\")  Wt 93.9 kg (207 lb)  SpO2 95%  BMI 31.94 kg/m2   Body mass index is 31.94 " kg/(m^2).     GENERAL: healthy, alert and no distress  EYES: Eyes grossly normal to inspection, PERRL, EOMI, sclerae white and conjunctivae normal  RESP: lungs clear to auscultation - no crackles or wheezes, no areas of dullness, no tachypnea  CV: Heart regular rate and rhythm without murmur, click or rub. No peripheral edema and peripheral pulses strong  SKIN: 1 cm raised hyperkeratotic lesion on the right parietal scalp.   MS: There is erythema and swelling on the medial side of the IP joint of the left great toe and palpation suggests the presence of a ganglion.  NEURO: Normal strength and tone, sensory exam grossly normal, mentation intact, oriented times 3 and cranial nerves 2-12 intact  PSYCH: mentation appears normal, affect normal/bright     Diagnostic Test Results:  none      ASSESSMENT/PLAN:                                                      (I70.202) Atherosclerosis of native artery of left lower extremity, with unspecified presence of clinical manifestation (H)  (primary encounter diagnosis)  Comment: Fasting. Clinically stable.  Plan: atorvastatin (LIPITOR) 40 MG tablet, Lipid         panel reflex to direct LDL Non-fasting, ALT        Return in about 6 months (around 9/22/2018) for full physical, cholesterol, blood pressure check.     (I70.0) Atherosclerosis of aorta (H)  Comment: Aorta last imaged in September 2016 and no aneurysm was present.  Plan: atorvastatin (LIPITOR) 40 MG tablet, Lipid         panel reflex to direct LDL Non-fasting, ALT        Monitor periodically.     (I10) Essential hypertension with goal blood pressure less than 140/90  Comment: Well controlled.  Plan: lisinopril (PRINIVIL/ZESTRIL) 10 MG tablet,         Potassium, Creatinine        Follow up in 6 months.     (L98.9) Skin lesion of scalp  Comment: The patient references an inflamed seborrheic keratosis I shaved off of his left temple back in 2012, but this lesion does not seem like an seborrheic keratosis.  Plan: DERMATOLOGY  REFERRAL            (M67.472) Ganglion cyst of left foot  Comment: Symptomatic.  Plan: PODIATRY/FOOT & ANKLE SURGERY REFERRAL            The information in this document, created by the medical scribe for me, accurately reflects the services I personally performed and the decisions made by me. I have reviewed and approved this document for accuracy prior to leaving the patient care area. March 22, 2018 10:17 AM   Ant Wright MD

## 2018-03-22 NOTE — MR AVS SNAPSHOT
After Visit Summary   3/22/2018    Florencio Doshi    MRN: [id number]           Patient Information     Date Of Birth          1950        Visit Information        Provider Department      3/22/2018 10:00 AM Ant Wright MD [facility]        Today's Diagnoses     Atherosclerosis of native artery of left lower extremity, with unspecified presence of clinical manifestation (H)    -  1    Atherosclerosis of aorta (H)        Essential hypertension with goal blood pressure less than 140/90        Skin lesion of scalp        Ganglion cyst of left foot          Care Instructions    At [facility], we strive to deliver an exceptional experience to you, every time we see you.  If you receive a survey in the mail, please send us back your thoughts. We really do value your feedback.    Based on your medical history, these are the current health maintenance/preventive care services that you are due for (some may have been done at this visit.)  Health Maintenance Due   Topic Date Due     AORTIC ANEURYSM SCREENING (SYSTEM ASSIGNED)  03/23/2016         Suggested websites for health information:  Www.[facility].org : Up to date and easily searchable information on multiple topics.  Www.medlineplus.gov : medication info, interactive tutorials, watch real surgeries online  Www.familydoctor.org : good info from the Academy of Family Physicians  Www.cdc.gov : public health info, travel advisories, epidemics (H1N1)  Www.aap.org : children's health info, normal development, vaccinations  Www.health.state.[place].us : [place] dept of health, public health issues in [place], N1N1    Your care team:                            Family Medicine Internal Medicine   MD Ortiz Carson MD Shantel Branch-Fleming, MD Katya Georgiev PA-C Megan Hill, APRN CAMPOS Stark MD Pediatrics   MATHIEU Lainez, CAMPOS Blake APRN CNP   MD Paulette Ang  MD Tash Guzman MD Kim Thein, APRN CNP      Clinic hours: Monday - Thursday 7 am-7 pm; Fridays 7 am-5 pm.   Urgent care: Monday - Friday 11 am-9 pm; Saturday and Sunday 9 am-5 pm.  Pharmacy : Monday -Thursday 8 am-8 pm; Friday 8 am-6 pm; Saturday and Sunday 9 am-5 pm.     Clinic: [phone]   Pharmacy: [phone]              Follow-ups after your visit        Additional Services     DERMATOLOGY REFERRAL       Your provider has referred you to:     [facility][1] [phone]   [email or web address]://[email or web address].[facility]/  [facility] [phone]   http://www.[email or web address]/[facility] Primary Skin Care [facility] [phone]   http://www.[email or web address].[facility]/    [facility] Dermatology [facility] [phone]   http://www.[facility].org/[facility]/dermatology-[facility]/  [facility] [phone]   http://www.[facility].org/Clinics/ltqpa-lkkid-tpcdyno-[place]/    FHN: [facility] Dermatology [facility] [phone]   Http://[email or web address]/    FHN: [facility] D[facility]atology [facility] [phone]  Http://[email or web address]    FHN: [facility] Dermatology [facility] [phone]   [email or web address]    FHN: [facility] [place] [phone]   [email or web address]    FHN: [facility] [place] [phone]   [email or web address]    FHN: [facility] [place] [phone]   http://[email or web address]    [facility] -  [place], [place], [place] (2 locations), and [place]  [phone]   [email or web address]      Please be aware that coverage of these services is subject to the terms and limitations of your health insurance plan.  Call member services at your health plan with any  benefit or coverage questions.      Please bring the following with you to your appointment:    (1) Any X-Rays, CTs or MRIs which have been performed.  Contact the facility where they were done to arrange for  prior to your scheduled appointment.    (2) List of current medications  (3) This referral request   (4) Any documents/labs given to you for this referral            PODIATRY/FOOT & ANKLE SURGERY REFERRAL       Your provider has referred you to: [facility], [facility], [facility], or [facility], Dr. Durbin. Please call [phone] or the Specialty Scheduling Line [phone].    Please be aware that coverage of these services is subject to the terms and limitations of your health insurance plan.  Call member services at your health plan with any benefit or coverage questions.      Please bring the following to your appointment:  >>   Any x-rays, CTs or MRIs which have been performed.  Contact the facility where they were done to arrange for  prior to your scheduled appointment.  Any new CT, MRI or other procedures ordered by your specialist must be performed at a [place] facility or coordinated by your clinic's referral office.    >>   List of current medications   >>   This referral request   >>   Any documents/labs given to you for this referral                  Follow-up notes from your care team     Return in about 6 months (around 9/22/2018) for full physical, cholesterol, blood pressure check.      Who to contact     If you have questions or need follow up information about today's clinic visit or your schedule please contact [facility] directly at [phone].  Normal or non-critical lab and imaging results will be communicated to you by MyChart, letter or phone within 4 business days after the clinic has received the results. If you do not hear from us within 7 days, please contact the clinic  "through YaSabe or phone. If you have a critical or abnormal lab result, we will notify you by phone as soon as possible.  Submit refill requests through YaSabe or call your pharmacy and they will forward the refill request to us. Please allow 3 business days for your refill to be completed.          Additional Information About Your Visit        HRBossharTakeCare Information     YaSabe lets you send messages to your doctor, view your test results, renew your prescriptions, schedule appointments and more. To sign up, go to www.Pending sale to [facility] and [employer]ira/YaSabe . Click on \"Log in\" on the left side of the screen, which will take you to the Welcome page. Then click on \"Sign up Now\" on the right side of the page.     You will be asked to enter the access code listed below, as well as some personal information. Please follow the directions to create your username and password.     Your access code is: [email or web address]  Expires: 2018 10:40 AM     Your access code will  in 90 days. If you need help or a new code, please call your [place] clinic or [phone].        Care EveryWhere ID     This is your Care EveryWhere ID. This could be used by other organizations to access your [facility] medical records  [id number]        Your Vitals Were     Pulse Temperature Height Pulse Oximetry BMI (Body Mass Index)       64 98  F (36.7  C) (Oral) 5' 7.5\" (1.715 m) 95% 31.94 kg/m2        Blood Pressure from Last 3 Encounters:   18 136/84   10/19/17 (!) 129/91   17 132/87    Weight from Last 3 Encounters:   18 207 lb (93.9 kg)   17 201 lb (91.2 kg)   17 203 lb (92.1 kg)              We Performed the Following     ALT     Creatinine     DERMATOLOGY REFERRAL     Lipid panel reflex to direct LDL Non-fasting     PODIATRY/FOOT & ANKLE SURGERY REFERRAL     Potassium          Where to get your medicines      These medications were sent to [facility] [address] [place], [place] [address] AT [facility] [address] [place] [address]     Phone:  [phone]     atorvastatin 40 MG tablet    lisinopril 10 MG tablet          Primary Care Provider Office Phone # Fax #    Ant Wright [phone] [address]  [place] [address]        Equal Access to Services     [facility]: Hadii aad ku hadasho Soomaali, waaxda luqadaha, qaybta kaalmada adeegyada, waxay idiin hayaan adeeg kharash la'aan ah. So [facility] [phone].    ATENCIÓN: Si habla español, tiene a marivn disposición servicios gratuitos de asistencia lingüística. Charleyame al [phone].    We comply with applicable federal civil rights laws and [place] laws. We do not discriminate on the basis of race, color, national origin, age, disability, sex, sexual orientation, or gender identity.            Thank you!     Thank you for choosing [facility]  for your care. Our goal is always to provide you with excellent care. Hearing back from our patients is one way we can continue to improve our services. Please take a few minutes to complete the written survey that you may receive in the mail after your visit with us. Thank you!             Your Updated Medication List - Protect others around you: Learn how to safely use, store and throw away your medicines at www.disposemymeds.org.          This list is accurate as of 3/22/18 10:40 AM.  Always use your most recent med list.                   Brand Name Dispense Instructions for use Diagnosis    aspirin 81 MG tablet      1 TABLET DAILY*        atorvastatin 40 MG tablet    LIPITOR    90 tablet    Take 1 tablet (40 mg) by mouth daily for cholesterol.    Atherosclerosis of native artery of left lower extremity, with unspecified presence of clinical manifestation (H), Atherosclerosis of aorta (H)       diclofenac 50 MG EC tablet    VOLTAREN    60 tablet    Take 1 tablet (50 mg) by mouth 3 times daily as needed for joint pain. Take with food.    Pain and  swelling of right ankle       fish oil-omega-3 fatty acids 1000 MG capsule      Take 1 capsule by mouth Every other day.        lisinopril 10 MG tablet    PRINIVIL/ZESTRIL    90 tablet    Take 1 tablet (10 mg) by mouth daily for blood pressure.    Essential hypertension with goal blood pressure less than 140/90       sildenafil 20 MG tablet    REVATIO    50 tablet    Take 1-3 tablets (20-60 mg) by mouth daily as needed , 1-3 hours before intimacy. Never use with nitroglycerin, terazosin or doxazosin.    Erectile dysfunction, unspecified erectile dysfunction type       vitamin D 1000 UNITS capsule      Take 1 capsule by mouth daily

## 2018-03-22 NOTE — LETTER
2018      Florencio Doshi  [address]  [place] [address]              Dear Florencio Kilpatricky      All of your labs were normal.     Enclosed is a copy of the results.  It was a pleasure to see you at your last appointment.    If you have any questions or concerns, please call myself or my nurse at [phone].      Sincerely,      Ant Wright MD/NOVA Swanson MA  [employer]      Results for orders placed or performed in visit on 18   Lipid panel reflex to direct LDL Non-fasting   Result Value Ref Range    Cholesterol 102 <200 mg/dL    Triglycerides 135 <150 mg/dL    HDL Cholesterol 46 >39 mg/dL    LDL Cholesterol Calculated 29 <100 mg/dL    Non HDL Cholesterol 56 <130 mg/dL   ALT   Result Value Ref Range    ALT 59 0 - 70 U/L   Potassium   Result Value Ref Range    Potassium 4.4 3.4 - 5.3 mmol/L   Creatinine   Result Value Ref Range    Creatinine 0.99 0.66 - 1.25 mg/dL    GFR Estimate 76 >60 mL/min/1.7m2    GFR Estimate If Black >90 >60 mL/min/1.7m2                 RE: Florencio Doshi   MRN: [id number]  : 1950  ENC DATE: Mar 22, 2018

## 2018-03-22 NOTE — PATIENT INSTRUCTIONS
At [facility], we strive to deliver an exceptional experience to you, every time we see you.  If you receive a survey in the mail, please send us back your thoughts. We really do value your feedback.    Based on your medical history, these are the current health maintenance/preventive care services that you are due for (some may have been done at this visit.)  Health Maintenance Due   Topic Date Due     AORTIC ANEURYSM SCREENING (SYSTEM ASSIGNED)  03/23/2016         Suggested websites for health information:  Www.Driverdo.org : Up to date and easily searchable information on multiple topics.  Www.medlineplus.gov : medication info, interactive tutorials, watch real surgeries online  Www.familydoctor.org : good info from the Academy of Family Physicians  Www.cdc.gov : public health info, travel advisories, epidemics (H1N1)  Www.aap.org : children's health info, normal development, vaccinations  Www.health.[facility].mn.us : [place] dept of health, public health issues in [place], N1N1    Your care team:                            Family Medicine Internal Medicine   MD Ortiz Carson MD Shantel Branch-Fleming, MD Katya Georgiev PA-C Megan Hill, APRN CNP    Elijah Stark MD Pediatrics   Blaine Le, PAEnioC  Stacy Quintanilla, CNP Katy Blake APRN CNP   MD Paulette Ang MD Deborah Mielke, MD Kim Thein, APRN CNP      Clinic hours: Monday - Thursday 7 am-7 pm; Fridays 7 am-5 pm.   Urgent care: Monday - Friday 11 am-9 pm; Saturday and Sunday 9 am-5 pm.  Pharmacy : Monday -Thursday 8 am-8 pm; Friday 8 am-6 pm; Saturday and Sunday 9 am-5 pm.     Clinic: [phone]   Pharmacy: [phone]

## 2018-03-28 ENCOUNTER — OFFICE VISIT (OUTPATIENT)
Dept: PODIATRY | Facility: CLINIC | Age: 68
End: 2018-03-28
Payer: MEDICARE

## 2018-03-28 VITALS — HEART RATE: 90 BPM | BODY MASS INDEX: 31.94 KG/M2 | WEIGHT: 207 LBS | OXYGEN SATURATION: 94 %

## 2018-03-28 DIAGNOSIS — M20.5X2 HALLUX LIMITUS OF LEFT FOOT: Primary | ICD-10-CM

## 2018-03-28 DIAGNOSIS — B35.1 DERMATOPHYTOSIS OF NAIL: ICD-10-CM

## 2018-03-28 DIAGNOSIS — M79.675 PAIN OF TOE OF LEFT FOOT: ICD-10-CM

## 2018-03-28 PROCEDURE — 11720 DEBRIDE NAIL 1-5: CPT | Performed by: PODIATRIST

## 2018-03-28 PROCEDURE — 99203 OFFICE O/P NEW LOW 30 MIN: CPT | Mod: 25 | Performed by: PODIATRIST

## 2018-03-28 NOTE — MR AVS SNAPSHOT
After Visit Summary   3/28/2018    Florencio Doshi    MRN: [id number]           Patient Information     Date Of Birth          1950        Visit Information        Provider Department      3/28/2018 8:00 AM Abbe Durbin, DPM [facility]        Care Instructions    We wish you continued good healing. If you have any questions or concerns, please do not hesitate to contact us at [phone]    Please remember to call and schedule a follow up appointment if one was recommended at your earliest convenience.   PODIATRY CLINIC HOURS  TELEPHONE NUMBER    Dr. Abbe Durbin DKeeleyPSHANNON [facility]    Clinics:  [facility]    [facility]   Tuesday 1PM-6PM  [facility]  Wednesday 7AM-2PM  [place]  Thursday 10AM-6PM  [place]  Friday 7AM-3PM  [place]  Specialty schedulers:   [phone] to make an appointment with any Specialty Provider.        Urgent Care locations:    [facility] Monday-Friday 5 pm - 9 pm. Saturday-Sunday 9 am -5pm    Monday-Friday 11 am - 9 pm Saturday 9 am - 5 pm     Monday-Sunday 12 noon-8PM [phone]     If you need a medication refill, please contact us you may need lab work and/or a follow up visit prior to your refill (i.e. Antifungal medications).    [employer] (secure e-mail communication and access to your chart) to send a message or to make an appointment.    If MRI needed please call [facility] at [phone]        Weight management plan: Patient was referred to their PCP to discuss a diet and exercise plan.            Follow-ups after your visit        Your next 10 appointments already scheduled     Mar 29, 2018  8:00 AM CDT   (Arrive by 7:45 AM)   New Patient Visit with Yuni Robbins MD   [facility] ([facility])    [address] [place] [address]  "[id number] [phone]              Who to contact     If you have questions or need follow up information about today's clinic visit or your schedule please contact [facility] YOANNA FOWLER directly at [phone].  Normal or non-critical lab and imaging results will be communicated to you by MyChart, letter or phone within 4 business days after the clinic has received the results. If you do not hear from us within 7 days, please contact the clinic through [employer] or phone. If you have a critical or abnormal lab result, we will notify you by phone as soon as possible.  Submit refill requests through [employer] or call your pharmacy and they will forward the refill request to us. Please allow 3 business days for your refill to be completed.          Additional Information About Your Visit        My[employer]CircleUp Information     [employer] lets you send messages to your doctor, view your test results, renew your prescriptions, schedule appointments and more. To sign up, go to [email or web address] . Click on \"Log in\" on the left side of the screen, which will take you to the Welcome page. Then click on \"Sign up Now\" on the right side of the page.     You will be asked to enter the access code listed below, as well as some personal information. Please follow the directions to create your username and password.     Your access code is: [email or web address]  Expires: 2018 10:40 AM     Your access code will  in 90 days. If you need help or a new code, please call your [facility] or [phone].        Care EveryWhere ID     This is your Care EveryWhere ID. This could be used by other organizations to access your [place] medical records  [id number]        Your Vitals Were     Pulse Pulse Oximetry BMI (Body Mass Index)             90 94% 31.94 kg/m2          Blood Pressure from Last 3 Encounters:   18 136/84   10/19/17 (!) 129/91   17 132/87    Weight from Last 3 Encounters:   18 207 lb (93.9 kg) "   03/22/18 207 lb (93.9 kg)   09/12/17 201 lb (91.2 kg)              Today, you had the following     No orders found for display       Primary Care Provider Office Phone # Fax #    Ant Wright [phone]       [address]  [facility] [address]        Equal Access to Services     [facility]: Hadii aad ku hadasho Soomaali, waaxda luqadaha, qaybta kaalmada adeegyada, waxay idiin hayaan adeeg kharash la'aan ah. So [facility] [phone].    ATENCIÓN: Si habla esprios, tiene a marvin disposición servicios gratuitos de asistencia lingüística. Llame al [phone].    We comply with applicable federal civil rights laws and [place] laws. We do not discriminate on the basis of race, color, national origin, age, disability, sex, sexual orientation, or gender identity.            Thank you!     Thank you for choosing [facility]  for your care. Our goal is always to provide you with excellent care. Hearing back from our patients is one way we can continue to improve our services. Please take a few minutes to complete the written survey that you may receive in the mail after your visit with us. Thank you!             Your Updated Medication List - Protect others around you: Learn how to safely use, store and throw away your medicines at www.disposemymeds.org.          This list is accurate as of 3/28/18  8:00 AM.  Always use your most recent med list.                   Brand Name Dispense Instructions for use Diagnosis    aspirin 81 MG tablet      1 TABLET DAILY*        atorvastatin 40 MG tablet    LIPITOR    90 tablet    Take 1 tablet (40 mg) by mouth daily for cholesterol.    Atherosclerosis of native artery of left lower extremity, with unspecified presence of clinical manifestation (H), Atherosclerosis of aorta (H)       diclofenac 50 MG EC tablet    VOLTAREN    60 tablet    Take 1 tablet (50 mg) by mouth 3 times daily as needed for joint pain. Take with food.    Pain and  swelling of right ankle       fish oil-omega-3 fatty acids 1000 MG capsule      Take 1 capsule by mouth Every other day.        lisinopril 10 MG tablet    PRINIVIL/ZESTRIL    90 tablet    Take 1 tablet (10 mg) by mouth daily for blood pressure.    Essential hypertension with goal blood pressure less than 140/90       sildenafil 20 MG tablet    REVATIO    50 tablet    Take 1-3 tablets (20-60 mg) by mouth daily as needed , 1-3 hours before intimacy. Never use with nitroglycerin, terazosin or doxazosin.    Erectile dysfunction, unspecified erectile dysfunction type       vitamin D 1000 UNITS capsule      Take 1 capsule by mouth daily

## 2018-03-28 NOTE — PATIENT INSTRUCTIONS
We wish you continued good healing. If you have any questions or concerns, please do not hesitate to contact us at [phone]    Please remember to call and schedule a follow up appointment if one was recommended at your earliest convenience.   PODIATRY CLINIC HOURS  TELEPHONE NUMBER    Dr. Abbe Durbin D.P.M [facility]    Clinics:  [facility]    [facility]   Tuesday 1PM-6PM  [place]  Wednesday 7AM-2PM  [facility]  Thursday 10AM-6PM  [place]  Friday 7AM-3PM  [place]  Specialty schedulers:   [phone] to make an appointment with any Specialty Provider.        Urgent Care locations:    [facility] Monday-Friday 5 pm - 9 pm. Saturday-Sunday 9 am -5pm    Monday-Friday 11 am - 9 pm Saturday 9 am - 5 pm     Monday-Sunday 12 noon-8PM [phone]     If you need a medication refill, please contact us you may need lab work and/or a follow up visit prior to your refill (i.e. Antifungal medications).    [employer] (secure e-mail communication and access to your chart) to send a message or to make an appointment.    If MRI needed please call [facility] at [phone]        Weight management plan: Patient was referred to their PCP to discuss a diet and exercise plan.

## 2018-03-28 NOTE — PROGRESS NOTES
Subjective:    Pt is seen today in consult form Dr. Wright with the c/c of painful left foot.  This has been symptomatic for the past 1 months.  Points to dorsum of first metatarsal phalangeal joint.  Has pain w/ ambulation and shoewear and is relieved by rest.  Denies pain in the contralateral foot.  Has tried NSAIDS with some success.   Also had pain and nail discoloration on left hallux for two years.  Aggravated by activity and relieved by rest.  Hard to walk with shoes at times.       ROS:  A 10-point review of systems was performed and is positive for that noted in the HPI and as seen above.  All other areas are negative.          Allergies   Allergen Reactions     Clindamycin Rash       Current Outpatient Prescriptions   Medication Sig Dispense Refill     lisinopril (PRINIVIL/ZESTRIL) 10 MG tablet Take 1 tablet (10 mg) by mouth daily for blood pressure. 90 tablet 1     atorvastatin (LIPITOR) 40 MG tablet Take 1 tablet (40 mg) by mouth daily for cholesterol. 90 tablet 1     sildenafil (REVATIO/VIAGRA) 20 MG tablet Take 1-3 tablets (20-60 mg) by mouth daily as needed , 1-3 hours before intimacy. Never use with nitroglycerin, terazosin or doxazosin. 50 tablet 0     diclofenac (VOLTAREN) 50 MG EC tablet Take 1 tablet (50 mg) by mouth 3 times daily as needed for joint pain. Take with food. 60 tablet 1     Cholecalciferol (VITAMIN D) 1000 UNITS capsule Take 1 capsule by mouth daily       ASPIRIN 81 MG PO TABS 1 TABLET DAILY*       fish oil-omega-3 fatty acids 1000 MG capsule Take 1 capsule by mouth Every other day.          Patient Active Problem List   Diagnosis     Atherosclerosis of aorta (H)     Atherosclerosis of native artery of left lower extremity (H)     CARDIOVASCULAR SCREENING; LDL GOAL LESS THAN 100     Psoriasis     Advance care planning     Gynecomastia     Impaired fasting glucose     Obesity, Class I, BMI 30-34.9     NAFLD (nonalcoholic fatty liver disease)     Elevated transaminase level      Essential hypertension with goal blood pressure less than 140/90       Past Medical History:   Diagnosis Date     Atherosclerosis of aorta (H) 9/17/2010     BPH      CARDIOVASCULAR SCREENING; LDL GOAL LESS THAN 100 10/31/2010    no hx of hyperlipidemia     Dislocation of elbow, left, open 6/19/12     Diverticulosis of colon     pan-     Elevated blood pressure reading without diagnosis of hypertension 9/1/2015     Elevated PSA      Epididymal cyst     RT     Essential hypertension with goal blood pressure less than 140/90      Fall from ladder 6/19/12     Hand eczema      Open fracture of left distal radius 6/19/12     Other atherosclerosis of native arteries of the extremities 9/17/2010     Psoriasis     palms     Pyelonephritis 1964       Past Surgical History:   Procedure Laterality Date     C HAND/FINGER SURGERY UNLISTED       COLONOSCOPY WITH CO2 INSUFFLATION N/A 10/19/2017    Procedure: COLONOSCOPY WITH CO2 INSUFFLATION;  COLON SCREEN/ COHEN;  Surgeon: Tristan Tipton MD;  Location: [facility] OR     HC REPAIR FINGER/HAND FLEXOR NOT ZONE 2, EACH  1991    LT index     OPEN REDUCTION INTERNAL FIXATION ELBOW  6/19/12    LT medial tendon/ligament repair, Dr. Jameel Tian     OPEN REDUCTION INTERNAL FIXATION FOREARM  6/19/12    LT distal radius, Dr. Jameel Tian     PROSTATE BIOPSY, NEEDLE, SATURATION SAMPLING  7/31/07    Dr. Waterman       Family History   Problem Relation Age of Onset     CANCER Sister      uterine or ovarian     Hypertension Brother      Aneurysm Paternal Uncle      AAA     HEART DISEASE Paternal Uncle      Prostate Cancer Maternal Uncle      CANCER Maternal Aunt      bladder or ureter?     Hypertension Mother      Arthritis Paternal Grandfather      gout     Aneurysm Paternal Grandfather      AAA     KIDNEY DISEASE Brother 63     kidney stone, hematuria     Deep Vein Thrombosis Brother 65     Myocardial Infarction Cousin 64     Pancreatic Cancer Maternal Aunt      Alzheimer Disease Paternal Uncle 86        Social History   Substance Use Topics     Smoking status: Never Smoker     Smokeless tobacco: Never Used     Alcohol use Yes      Comment: occassionally         Exam:    Vitals: Pulse 90  Wt 207 lb (93.9 kg)  SpO2 94%  BMI 31.94 kg/m2  BMI: Body mass index is 31.94 kg/(m^2).  Height: Data Unavailable    Constitutional/ general:  Pt is in no apparent distress, appears well-nourished.  Cooperative with history and physical exam.     Psych:  The patient answered questions appropriately.  Normal affect.  Seems to have reasonable expectations, in terms of treatment.     Eyes:  Visual scanning/ tracking without deficit.     Ears:  Response to auditory stimuli is normal.  negative hearing aid devices.  Auricles in proper alignment.     Lymphatic:  Popliteal lymph nodes not enlarged.     Lungs:  Non labored breathing, non labored speech. No cough.  No audible wheezing. Even, quiet breathing.       Vascular:  positive pedal pulses bilaterally for both the DP and PT arteries.  CFT < 3 sec.  positive ankle edema.  positive pedal hair growth.    Neuro:  Alert and oriented x 3. Coordinated gait.  Light touch sensation is intact to the L4, L5, S1 distributions. No obvious deficits.  No evidence of neurological-based weakness, spasticity, or contracture in the lower extremities    Derm: Normal texture and turgor.  No erythema, ecchymosis, or cyanosis.  Left hallux nail thick and mycotic with underlying nailbed healthy.       Musculoskeletal:    Lower extremity muscle strength is normal.  Patient is ambulatory without an assistive device or brace.  No gross deformities.  Normal arch.    Normal ROM all forefoot and rearfoot joints except [id number] MTPJ.  Patient has pain with range of motion.  Miky proliferation dorsally.  No echymosis, edema, signs of infection or trauma. No open lesions, increased warmth or ascending cellulitis.  X-ray three views foot shows joint space narrowing of 1st mtpj, subcondral schlerosis, and a  dorsal flag with joint mice.  No other fractures/pathology noted.      Assessment:  Hallux Limitus left foot                          Left hallux onychomycosis with pain.     Plan:  X-rays personally reviewed.  Discussed etiology and treatment options in detail w/ the pt.  Xrays reviewed with patient.  Recommended wearing a stiff soled shoe, icing, limiting activities, not going barefoot.  Avoid activities that bother this and discussed what will aggravate.  Discussed injections.  Also discussed surgical options if conservative treatment fails.  Mycotic nail manually debrided with a .  He will keep this down and try topical otc antifungal.  Return to clinic prn.  Thank you for allowing me participate in the care of this patient.            Abbe Durbin DPM, FACFAS

## 2018-03-28 NOTE — LETTER
3/28/2018         RE: Florencio Doshi  [address]  [facility] [place] [address]        Dear Colleague,    Thank you for referring your patient, Florencio Doshi, to the [facility]. Please see a copy of my visit note below.     Subjective:    Pt is seen today in consult form Dr. Wright with the c/c of painful left foot.  This has been symptomatic for the past 1 months.  Points to dorsum of first metatarsal phalangeal joint.  Has pain w/ ambulation and shoewear and is relieved by rest.  Denies pain in the contralateral foot.  Has tried NSAIDS with some success.   Also had pain and nail discoloration on left hallux for two years.  Aggravated by activity and relieved by rest.  Hard to walk with shoes at times.       ROS:  A 10-point review of systems was performed and is positive for that noted in the HPI and as seen above.  All other areas are negative.          Allergies   Allergen Reactions     Clindamycin Rash       Current Outpatient Prescriptions   Medication Sig Dispense Refill     lisinopril (PRINIVIL/ZESTRIL) 10 MG tablet Take 1 tablet (10 mg) by mouth daily for blood pressure. 90 tablet 1     atorvastatin (LIPITOR) 40 MG tablet Take 1 tablet (40 mg) by mouth daily for cholesterol. 90 tablet 1     sildenafil (REVATIO/VIAGRA) 20 MG tablet Take 1-3 tablets (20-60 mg) by mouth daily as needed , 1-3 hours before intimacy. Never use with nitroglycerin, terazosin or doxazosin. 50 tablet 0     diclofenac (VOLTAREN) 50 MG EC tablet Take 1 tablet (50 mg) by mouth 3 times daily as needed for joint pain. Take with food. 60 tablet 1     Cholecalciferol (VITAMIN D) 1000 UNITS capsule Take 1 capsule by mouth daily       ASPIRIN 81 MG PO TABS 1 TABLET DAILY*       fish oil-omega-3 fatty acids 1000 MG capsule Take 1 capsule by mouth Every other day.          Patient Active Problem List   Diagnosis     Atherosclerosis of aorta (H)     Atherosclerosis of native artery of left lower extremity (H)      CARDIOVASCULAR SCREENING; LDL GOAL LESS THAN 100     Psoriasis     Advance care planning     Gynecomastia     Impaired fasting glucose     Obesity, Class I, BMI 30-34.9     NAFLD (nonalcoholic fatty liver disease)     Elevated transaminase level     Essential hypertension with goal blood pressure less than 140/90       Past Medical History:   Diagnosis Date     Atherosclerosis of aorta (H) 9/17/2010     BPH      CARDIOVASCULAR SCREENING; LDL GOAL LESS THAN 100 10/31/2010    no hx of hyperlipidemia     Dislocation of elbow, left, open 6/19/12     Diverticulosis of colon     pan-     Elevated blood pressure reading without diagnosis of hypertension 9/1/2015     Elevated PSA      Epididymal cyst     RT     Essential hypertension with goal blood pressure less than 140/90      Fall from ladder 6/19/12     Hand eczema      Open fracture of left distal radius 6/19/12     Other atherosclerosis of native arteries of the extremities 9/17/2010     Psoriasis     palms     Pyelonephritis 1964       Past Surgical History:   Procedure Laterality Date     C HAND/FINGER SURGERY UNLISTED       COLONOSCOPY WITH CO2 INSUFFLATION N/A 10/19/2017    Procedure: COLONOSCOPY WITH CO2 INSUFFLATION;  COLON SCREEN/ COHEN;  Surgeon: Tristan Tipton MD;  Location: [facility] OR     HC REPAIR FINGER/HAND FLEXOR NOT ZONE 2, EACH  1991    LT index     OPEN REDUCTION INTERNAL FIXATION ELBOW  6/19/12    LT medial tendon/ligament repair, Dr. Jameel Tian     OPEN REDUCTION INTERNAL FIXATION FOREARM  6/19/12    LT distal radius, Dr. Jameel Tian     PROSTATE BIOPSY, NEEDLE, SATURATION SAMPLING  7/31/07    Dr. Waterman       Family History   Problem Relation Age of Onset     CANCER Sister      uterine or ovarian     Hypertension Brother      Aneurysm Paternal Uncle      AAA     HEART DISEASE Paternal Uncle      Prostate Cancer Maternal Uncle      CANCER Maternal Aunt      bladder or ureter?     Hypertension Mother      Arthritis Paternal Grandfather      gout      Aneurysm Paternal Grandfather      AAA     KIDNEY DISEASE Brother 63     kidney stone, hematuria     Deep Vein Thrombosis Brother 65     Myocardial Infarction Cousin 64     Pancreatic Cancer Maternal Aunt      Alzheimer Disease Paternal Uncle 86       Social History   Substance Use Topics     Smoking status: Never Smoker     Smokeless tobacco: Never Used     Alcohol use Yes      Comment: occassionally         Exam:    Vitals: Pulse 90  Wt 207 lb (93.9 kg)  SpO2 94%  BMI 31.94 kg/m2  BMI: Body mass index is 31.94 kg/(m^2).  Height: Data Unavailable    Constitutional/ general:  Pt is in no apparent distress, appears well-nourished.  Cooperative with history and physical exam.     Psych:  The patient answered questions appropriately.  Normal affect.  Seems to have reasonable expectations, in terms of treatment.     Eyes:  Visual scanning/ tracking without deficit.     Ears:  Response to auditory stimuli is normal.  negative hearing aid devices.  Auricles in proper alignment.     Lymphatic:  Popliteal lymph nodes not enlarged.     Lungs:  Non labored breathing, non labored speech. No cough.  No audible wheezing. Even, quiet breathing.       Vascular:  positive pedal pulses bilaterally for both the DP and PT arteries.  CFT < 3 sec.  positive ankle edema.  positive pedal hair growth.    Neuro:  Alert and oriented x 3. Coordinated gait.  Light touch sensation is intact to the L4, L5, S1 distributions. No obvious deficits.  No evidence of neurological-based weakness, spasticity, or contracture in the lower extremities    Derm: Normal texture and turgor.  No erythema, ecchymosis, or cyanosis.  Left hallux nail thick and mycotic with underlying nailbed healthy.       Musculoskeletal:    Lower extremity muscle strength is normal.  Patient is ambulatory without an assistive device or brace.  No gross deformities.  Normal arch.    Normal ROM all forefoot and rearfoot joints except [id number] MTPJ.  Patient has pain with  range of motion.  Miky proliferation dorsally.  No echymosis, edema, signs of infection or trauma. No open lesions, increased warmth or ascending cellulitis.  X-ray three views foot shows joint space narrowing of 1st mtpj, subcondral schlerosis, and a dorsal flag with joint mice.  No other fractures/pathology noted.      Assessment:  Hallux Limitus left foot                          Left hallux onychomycosis with pain.     Plan:  X-rays personally reviewed.  Discussed etiology and treatment options in detail w/ the pt.  Xrays reviewed with patient.  Recommended wearing a stiff soled shoe, icing, limiting activities, not going barefoot.  Avoid activities that bother this and discussed what will aggravate.  Discussed injections.  Also discussed surgical options if conservative treatment fails.  Mycotic nail manually debrided with a .  He will keep this down and try topical otc antifungal.  Return to clinic prn.  Thank you for allowing me participate in the care of this patient.            Abbe Durbin DPM, FACFAS       Again, thank you for allowing me to participate in the care of your patient.        Sincerely,        Abbe Durbin DPM

## 2018-03-29 ENCOUNTER — OFFICE VISIT (OUTPATIENT)
Dept: DERMATOLOGY | Facility: CLINIC | Age: 68
End: 2018-03-29
Payer: MEDICARE

## 2018-03-29 DIAGNOSIS — D48.9 NEOPLASM OF UNCERTAIN BEHAVIOR: Primary | ICD-10-CM

## 2018-03-29 PROCEDURE — 88305 TISSUE EXAM BY PATHOLOGIST: CPT | Performed by: DERMATOLOGY

## 2018-03-29 RX ORDER — LIDOCAINE HYDROCHLORIDE AND EPINEPHRINE 10; 10 MG/ML; UG/ML
3 INJECTION, SOLUTION INFILTRATION; PERINEURAL ONCE
Qty: 3 ML | Refills: 0 | OUTPATIENT
Start: 2018-03-29 | End: 2018-03-29

## 2018-03-29 ASSESSMENT — ACTIVITIES OF DAILY LIVING (ADL)
SWALLOWING: 0 - SWALLOWS FOODS/LIQUIDS WITHOUT DIFFICULTY
RETIRED_EATING: 0-->INDEPENDENT
TRANSFERRING: 0-->INDEPENDENT
COMMUNICATION: 0 - UNDERSTANDS/COMMUNICATES WITHOUT DIFFICULTY
BATHING: 0-->INDEPENDENT
FALL_HISTORY_WITHIN_LAST_SIX_MONTHS: NO
AMBULATION: 0-->INDEPENDENT
TOILETING: 0 - INDEPENDENT
SWALLOWING: 0-->SWALLOWS FOODS/LIQUIDS WITHOUT DIFFICULTY
RETIRED_COMMUNICATION: 0-->UNDERSTANDS/COMMUNICATES WITHOUT DIFFICULTY
DRESS: 0-->INDEPENDENT
COGNITION: 0 - NO COGNITION ISSUES REPORTED
TOILETING: 0-->INDEPENDENT
CHANGE_IN_FUNCTIONAL_STATUS_SINCE_ONSET_OF_CURRENT_ILLNESS/INJURY: NO
BATHING: 0 - INDEPENDENT
AMBULATION: 0 - INDEPENDENT
DRESS: 0 - INDEPENDENT
EATING: 0 - INDEPENDENT
TRANSFERRING: 0 - INDEPENDENT

## 2018-03-29 ASSESSMENT — PAIN SCALES - GENERAL
PAINLEVEL: NO PAIN (0)
PAINLEVEL: NO PAIN (0)

## 2018-03-29 NOTE — NURSING NOTE
Dermatology Rooming Note    Florencio Doshi's goals for this visit include:   Chief Complaint   Patient presents with     Derm Problem     Florencio is visiting to discuss scalp issues.   Evon Pretty LPN

## 2018-03-29 NOTE — NURSING NOTE
Lidocaine  1mL once for one use, starting 3/29/2018 ending 3/29/2018,  2mL disp, R-0, injection  Injected by Dr. Robbins

## 2018-03-29 NOTE — PROGRESS NOTES
[facility] Dermatology Note      Dermatology Problem List:  1.KARL ROA scalp. Bx 3/29/2018.     Encounter Date: Mar 29, 2018    CC:   Chief Complaint   Patient presents with     Derm Problem     Florencio is visiting to discuss scalp issues.         History of Present Illness:  Mr. Florencio Doshi is a 68 year old male who presents in self referral for evaluation of lesion on scalp. Patient noted lesion around the end of February.  Since then it has been growing.  It itches occasionally but is not painful and does not bleed.  He has a history of something root in the past but it did not require further treatment.  He does not think it was cancerous.  No personal or family history of skin cancer.  He is otherwise in usual state and has no additional skin concerns.    Past Medical History:   Patient Active Problem List   Diagnosis     Atherosclerosis of aorta (H)     Atherosclerosis of native artery of left lower extremity (H)     CARDIOVASCULAR SCREENING; LDL GOAL LESS THAN 100     Psoriasis     Advance care planning     Gynecomastia     Impaired fasting glucose     Obesity, Class I, BMI 30-34.9     NAFLD (nonalcoholic fatty liver disease)     Elevated transaminase level     Essential hypertension with goal blood pressure less than 140/90     Past Medical History:   Diagnosis Date     Atherosclerosis of aorta (H) 9/17/2010     BPH      CARDIOVASCULAR SCREENING; LDL GOAL LESS THAN 100 10/31/2010    no hx of hyperlipidemia     Dislocation of elbow, left, open 6/19/12     Diverticulosis of colon     pan-     Elevated blood pressure reading without diagnosis of hypertension 9/1/2015     Elevated PSA      Epididymal cyst     RT     Essential hypertension with goal blood pressure less than 140/90      Fall from ladder 6/19/12     Hand eczema      Open fracture of left distal radius 6/19/12     Other atherosclerosis of native arteries of the extremities 9/17/2010     Psoriasis     palms     Pyelonephritis  1964     Past Surgical History:   Procedure Laterality Date     C HAND/FINGER SURGERY UNLISTED       COLONOSCOPY WITH CO2 INSUFFLATION N/A 10/19/2017    Procedure: COLONOSCOPY WITH CO2 INSUFFLATION;  COLON SCREEN/ VICKI;  Surgeon: Tristan Tipton MD;  Location: [facility] OR     HC REPAIR FINGER/HAND FLEXOR NOT ZONE 2, EACH  1991    LT index     OPEN REDUCTION INTERNAL FIXATION ELBOW  6/19/12    LT medial tendon/ligament repair, Dr. Jameel Tian     OPEN REDUCTION INTERNAL FIXATION FOREARM  6/19/12    LT distal radius, Dr. Jameel Tian     PROSTATE BIOPSY, NEEDLE, SATURATION SAMPLING  7/31/07    Dr. Waterman       Social History:  Patient is .    Family History:  Family History   Problem Relation Age of Onset     CANCER Sister      uterine or ovarian     Hypertension Brother      Aneurysm Paternal Uncle      AAA     HEART DISEASE Paternal Uncle      Prostate Cancer Maternal Uncle      CANCER Maternal Aunt      bladder or ureter?     Hypertension Mother      Arthritis Paternal Grandfather      gout     Aneurysm Paternal Grandfather      AAA     KIDNEY DISEASE Brother 63     kidney stone, hematuria     Deep Vein Thrombosis Brother 65     Myocardial Infarction Cousin 64     Pancreatic Cancer Maternal Aunt      Alzheimer Disease Paternal Uncle 86     Melanoma No family hx of      Skin Cancer No family hx of        Medications:  Current Outpatient Prescriptions   Medication Sig Dispense Refill     lidocaine 1% with EPINEPHrine 1:100,000 1 %-1:709528 injection Inject 3 mLs into the skin once for 1 dose 3 mL 0     lisinopril (PRINIVIL/ZESTRIL) 10 MG tablet Take 1 tablet (10 mg) by mouth daily for blood pressure. 90 tablet 1     atorvastatin (LIPITOR) 40 MG tablet Take 1 tablet (40 mg) by mouth daily for cholesterol. 90 tablet 1     diclofenac (VOLTAREN) 50 MG EC tablet Take 1 tablet (50 mg) by mouth 3 times daily as needed for joint pain. Take with food. 60 tablet 1     Cholecalciferol (VITAMIN D) 1000 UNITS capsule Take  1 capsule by mouth daily       ASPIRIN 81 MG PO TABS 1 TABLET DAILY*       fish oil-omega-3 fatty acids 1000 MG capsule Take 1 capsule by mouth Every other day.        sildenafil (REVATIO/VIAGRA) 20 MG tablet Take 1-3 tablets (20-60 mg) by mouth daily as needed , 1-3 hours before intimacy. Never use with nitroglycerin, terazosin or doxazosin. (Patient not taking: Reported on 3/29/2018) 50 tablet 0        Allergies   Allergen Reactions     Clindamycin Rash         Review of Systems:  -Skin: The patient denies any new rash, pruritus, or lesions that are symptomatic, changing or bleeding, except as per HPI.  -Constitutional: Otherwise in usual state of health.  -Skin: As above in HPI. No additional skin concerns.    Physical exam:  GEN: This is a well developed, well-nourished male in no acute distress, in a pleasant mood.    SKIN: Focused examination of the scalp, face, back, and dorsal arms was performed.  - Lesion of concern on R scalp is light pink waxy stuck on papule with overlying hemorrhagic crust.  - Declines FBSE today  - No other lesions of concern on areas examined.     Impression/Plan:  1. 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 The differential diagnosis includes irritated SK v SCC. Offered empiric cryo versus biopsy today and patient would prefer biopsy.     Shave biopsy:  After discussion of benefits and risks including but not limited to bleeding/bruising, pain/swelling, infection, scar, incomplete removal, nerve damage/numbness, recurrence, and non-diagnostic biopsy, written consent, verbal consent and photographs were obtained. Time-out was performed. The area was cleaned with isopropyl alcohol.  was injected to obtain adequate anesthesia of the lesion on the R scalp. 1 ml of 1% lidocaine was injected to obtain adequate anesthesia. A  shave biopsy was performed. Hemostasis was achieved with aluminium chloride. Vaseline and a sterile dressing were applied. The patient tolerated the procedure  and no complications were noted. The patient was provided with verbal and written post care instructions.        Follow-up in 9 months for FBSE (winter preferred time for patient), earlier for new or changing lesions or sooner pending pathology results.    Dr. Alley Garza staffed the patient.    Staff Involved:  Resident(Yuni Robbins)/Staff(as above)    Patient was seen and examined with the medicine/dermatology resident. I agree with the history, review of systems, physical examination, assessments and plan. I was present for the key portion of the biopsy procedure.     Alley Garza MD  Professor and  Chair  Department of Dermatology  [facility]

## 2018-03-29 NOTE — Clinical Note
For continuity cosign and close encounter :) Sorry this might have sent twice but not sure if worked the first time!

## 2018-03-29 NOTE — MR AVS SNAPSHOT
After Visit Summary   3/29/2018    Florencio Doshi    MRN: [id number]           Patient Information     Date Of Birth          1950        Visit Information        Provider Department      3/29/2018 8:00 AM Yuni Robbins MD [facility] Dermatology        Today's Diagnoses     Neoplasm of uncertain behavior    -  1      Care Instructions    Wound Care After a Biopsy    What is a skin biopsy?  A skin biopsy allows the doctor to examine a very small piece of tissue under the microscope to determine the diagnosis and the best treatment for the skin condition. A local anesthetic (numbing medicine)  is injected with a very small needle into the skin area to be tested. A small piece of skin is taken from the area. Sometimes a suture (stitch) is used.     What are the risks of a skin biopsy?  I will experience scar, bleeding, swelling, pain, crusting and redness. I may experience incomplete removal or recurrence. Risks of this procedure are excessive bleeding, bruising, infection, nerve damage, numbness, thick (hypertrophic or keloidal) scar and non-diagnostic biopsy.    How should I care for my wound for the first 24 hours?    Keep the wound dry and covered for 24 hours    If it bleeds, hold direct pressure on the area for 15 minutes. If bleeding does not stop then go to the emergency room    Avoid strenuous exercise the first 1-2 days or as your doctor instructs you    How should I care for the wound after 24 hours?    After 24 hours, remove the bandage    You may bathe or shower as normal    If you had a scalp biopsy, you can shampoo as usual and can use shower water to clean the biopsy site daily    Clean the wound twice a day with gentle soap and water    Do not scrub, be gentle    Apply white petroleum/Vaseline after cleaning the wound with a cotton swab or a clean finger, and keep the site covered with a Bandaid /bandage. Bandages are not necessary with a scalp biopsy    If you are unable to  cover the site with a Bandaid /bandage, re-apply ointment 2-3 times a day to keep the site moist. Moisture will help with healing    Avoid strenuous activity for first 1-2 days    Avoid lakes, rivers, pools, and oceans until the stitches are removed or the site is healed    How do I clean my wound?    Wash hands thoroughly with soap or use hand  before all wound care    Clean the wound with gentle soap and water    Apply white petroleum/Vaseline  to wound after it is clean    Replace the Bandaid /bandage to keep the wound covered for the first few days or as instructed by your doctor    If you had a scalp biopsy, warm shower water to the area on a daily basis should suffice    What should I use to clean my wound?     Cotton-tipped applicators (Qtips )    White petroleum jelly (Vaseline ). Use a clean new container and use Q-tips to apply.    Bandaids   as needed    Gentle soap     How should I care for my wound long term?    Do not get your wound dirty    Keep up with wound care for one week or until the area is healed.    A small scab will form and fall off by itself when the area is completely healed. The area will be red and will become pink in color as it heals. Sun protection is very important for how your scar will turn out. Sunscreen with an SPF 30 or greater is recommended once the area is healed.    If you have stitches, stitches need to be removed in 14 days. You may return to our clinic for this or you may have it done locally at your doctor s office.    You should have some soreness but it should be mild and slowly go away over several days. Talk to your doctor about using tylenol for pain,    When should I call my doctor?  If you have increased:     Pain or swelling    Pus or drainage (clear or slightly yellow drainage is ok)    Temperature over 100F    Spreading redness or warmth around wound    When will I hear about my results?  The biopsy results can take 2-3 weeks to come back. The clinic  will call you with the results, send you a Xockets message, or have you schedule a follow-up clinic or phone time to discuss the results. Contact our clinics if you do not hear from us in 3 weeks.     Who should I call with questions?    [facility]: [phone]     [facility]: [phone]    For urgent needs outside of business hours call the [facility] at [phone] and ask for the dermatology resident on call              Follow-ups after your visit        Follow-up notes from your care team     Return in about 1 year (around 3/29/2019).      Who to contact     Please call your clinic at [phone] to:    Ask questions about your health    Make or cancel appointments    Discuss your medicines    Learn about your test results    Speak to your doctor            Additional Information About Your Visit        MyChart Information     [employer] is an electronic gateway that provides easy, online access to your medical records. With Access [employer], you can request a clinic appointment, read your test results, renew a prescription or communicate with your care team.     To sign up for Access [employer] 3 visit the website at [email or web address]   You will be asked to enter the access code listed below, as well as some personal information. Please follow the directions to create your username and password.     Your access code is: [email or web address]  Expires: 2018 10:40 AM     Your access code will  in 90 days. If you need help or a new code, please contact your [facility] or call [phone] for assistance.        Care EveryWhere ID     This is your Care EveryWhere ID. This could be used by other organizations to access your [facility] medical records  [id number]         Blood Pressure from Last 3 Encounters:   18 136/84   10/19/17 (!) 129/91   17 132/87    Weight from Last 3 Encounters:   18 93.9 kg (207  lb)   03/22/18 93.9 kg (207 lb)   09/12/17 91.2 kg (201 lb)              We Performed the Following     BIOPSY SKIN/SUBQ/MUC MEM, SINGLE LESION     Dermatological path order and indications          Today's Medication Changes          These changes are accurate as of 3/29/18  8:42 AM.  If you have any questions, ask your nurse or doctor.               Start taking these medicines.        Dose/Directions    lidocaine 1% with EPINEPHrine 1:100,000 1 %-1:944323 injection   Used for:  Neoplasm of uncertain behavior   Started by:  Yuni Robbins MD        Dose:  3 mL   Inject 3 mLs into the skin once for 1 dose   Quantity:  3 mL   Refills:  0            Where to get your medicines      Some of these will need a paper prescription and others can be bought over the counter.  Ask your nurse if you have questions.     You don't need a prescription for these medications     lidocaine 1% with EPINEPHrine 1:100,000 1 %-1:757155 injection                Primary Care Provider Office Phone # Fax #    Ant Wright [phone]       [address] [facility] [address]        Equal Access to Services     [facility]: Hadii kathleen villegas hadprincess Soteressa, waaxda luqadaha, qaybta kaalmaaldo haines, cara meadows . So [facility] [phone].    ATENCIÓN: Si habla español, tiene a marvin disposición servicios gratuitos de asistencia lingüística. [facility] [phone].    We comply with applicable federal civil rights laws and [place] laws. We do not discriminate on the basis of race, color, national origin, age, disability, sex, sexual orientation, or gender identity.            Thank you!     Thank you for choosing [facility] DERMATOLOGY  for your care. Our goal is always to provide you with excellent care. Hearing back from our patients is one way we can continue to improve our services. Please take a few minutes to complete the written survey that you may receive in the mail after your  visit with us. Thank you!             Your Updated Medication List - Protect others around you: Learn how to safely use, store and throw away your medicines at www.disposemymeds.org.          This list is accurate as of 3/29/18  8:42 AM.  Always use your most recent med list.                   Brand Name Dispense Instructions for use Diagnosis    aspirin 81 MG tablet      1 TABLET DAILY*        atorvastatin 40 MG tablet    LIPITOR    90 tablet    Take 1 tablet (40 mg) by mouth daily for cholesterol.    Atherosclerosis of native artery of left lower extremity, with unspecified presence of clinical manifestation (H), Atherosclerosis of aorta (H)       diclofenac 50 MG EC tablet    VOLTAREN    60 tablet    Take 1 tablet (50 mg) by mouth 3 times daily as needed for joint pain. Take with food.    Pain and swelling of right ankle       fish oil-omega-3 fatty acids 1000 MG capsule      Take 1 capsule by mouth Every other day.        lidocaine 1% with EPINEPHrine 1:100,000 1 %-1:138006 injection     3 mL    Inject 3 mLs into the skin once for 1 dose    Neoplasm of uncertain behavior       lisinopril 10 MG tablet    PRINIVIL/ZESTRIL    90 tablet    Take 1 tablet (10 mg) by mouth daily for blood pressure.    Essential hypertension with goal blood pressure less than 140/90       sildenafil 20 MG tablet    REVATIO    50 tablet    Take 1-3 tablets (20-60 mg) by mouth daily as needed , 1-3 hours before intimacy. Never use with nitroglycerin, terazosin or doxazosin.    Erectile dysfunction, unspecified erectile dysfunction type       vitamin D 1000 UNITS capsule      Take 1 capsule by mouth daily

## 2018-03-29 NOTE — LETTER
3/29/2018       RE: Florencio Doshi  [address]  [place] [facility] [address]     Dear Colleague,    Thank you for referring your patient, Florencio Doshi, to the [facility] DERMATOLOGY at [facility]. Please see a copy of my visit note below.    [facility] Dermatology Note      Dermatology Problem List:  1.NUB, KARL scalp. Bx 3/29/2018.     Encounter Date: Mar 29, 2018    CC:   Chief Complaint   Patient presents with     Derm Problem     Florencio is visiting to discuss scalp issues.         History of Present Illness:  Mr. Florencio Doshi is a 68 year old male who presents in self referral for evaluation of lesion on scalp. Patient noted lesion around the end of February.  Since then it has been growing.  It itches occasionally but is not painful and does not bleed.  He has a history of something root in the past but it did not require further treatment.  He does not think it was cancerous.  No personal or family history of skin cancer.  He is otherwise in usual state and has no additional skin concerns.    Past Medical History:   Patient Active Problem List   Diagnosis     Atherosclerosis of aorta (H)     Atherosclerosis of native artery of left lower extremity (H)     CARDIOVASCULAR SCREENING; LDL GOAL LESS THAN 100     Psoriasis     Advance care planning     Gynecomastia     Impaired fasting glucose     Obesity, Class I, BMI 30-34.9     NAFLD (nonalcoholic fatty liver disease)     Elevated transaminase level     Essential hypertension with goal blood pressure less than 140/90     Past Medical History:   Diagnosis Date     Atherosclerosis of aorta (H) 9/17/2010     BPH      CARDIOVASCULAR SCREENING; LDL GOAL LESS THAN 100 10/31/2010    no hx of hyperlipidemia     Dislocation of elbow, left, open 6/19/12     Diverticulosis of colon     pan-     Elevated blood pressure reading without diagnosis of hypertension 9/1/2015     Elevated PSA      Epididymal cyst      RT     Essential hypertension with goal blood pressure less than 140/90      Fall from ladder 6/19/12     Hand eczema      Open fracture of left distal radius 6/19/12     Other atherosclerosis of native arteries of the extremities 9/17/2010     Psoriasis     palms     Pyelonephritis 1964     Past Surgical History:   Procedure Laterality Date     C HAND/FINGER SURGERY UNLISTED       COLONOSCOPY WITH CO2 INSUFFLATION N/A 10/19/2017    Procedure: COLONOSCOPY WITH CO2 INSUFFLATION;  COLON SCREEN/ COHEN;  Surgeon: Tristan Tipton MD;  Location: [facility] OR     HC REPAIR FINGER/HAND FLEXOR NOT ZONE 2, EACH  1991    LT index     OPEN REDUCTION INTERNAL FIXATION ELBOW  6/19/12    LT medial tendon/ligament repair, Dr. Jameel Tian     OPEN REDUCTION INTERNAL FIXATION FOREARM  6/19/12    LT distal radius, Dr. Jameel Tian     PROSTATE BIOPSY, NEEDLE, SATURATION SAMPLING  7/31/07    Dr. Waterman       Social History:  Patient is .    Family History:  Family History   Problem Relation Age of Onset     CANCER Sister      uterine or ovarian     Hypertension Brother      Aneurysm Paternal Uncle      AAA     HEART DISEASE Paternal Uncle      Prostate Cancer Maternal Uncle      CANCER Maternal Aunt      bladder or ureter?     Hypertension Mother      Arthritis Paternal Grandfather      gout     Aneurysm Paternal Grandfather      AAA     KIDNEY DISEASE Brother 63     kidney stone, hematuria     Deep Vein Thrombosis Brother 65     Myocardial Infarction Cousin 64     Pancreatic Cancer Maternal Aunt      Alzheimer Disease Paternal Uncle 86     Melanoma No family hx of      Skin Cancer No family hx of        Medications:  Current Outpatient Prescriptions   Medication Sig Dispense Refill     lidocaine 1% with EPINEPHrine 1:100,000 1 %-1:832747 injection Inject 3 mLs into the skin once for 1 dose 3 mL 0     lisinopril (PRINIVIL/ZESTRIL) 10 MG tablet Take 1 tablet (10 mg) by mouth daily for blood pressure. 90 tablet 1     atorvastatin  (LIPITOR) 40 MG tablet Take 1 tablet (40 mg) by mouth daily for cholesterol. 90 tablet 1     diclofenac (VOLTAREN) 50 MG EC tablet Take 1 tablet (50 mg) by mouth 3 times daily as needed for joint pain. Take with food. 60 tablet 1     Cholecalciferol (VITAMIN D) 1000 UNITS capsule Take 1 capsule by mouth daily       ASPIRIN 81 MG PO TABS 1 TABLET DAILY*       fish oil-omega-3 fatty acids 1000 MG capsule Take 1 capsule by mouth Every other day.        sildenafil (REVATIO/VIAGRA) 20 MG tablet Take 1-3 tablets (20-60 mg) by mouth daily as needed , 1-3 hours before intimacy. Never use with nitroglycerin, terazosin or doxazosin. (Patient not taking: Reported on 3/29/2018) 50 tablet 0        Allergies   Allergen Reactions     Clindamycin Rash         Review of Systems:  -Skin: The patient denies any new rash, pruritus, or lesions that are symptomatic, changing or bleeding, except as per HPI.  -Constitutional: Otherwise in usual state of health.  -Skin: As above in HPI. No additional skin concerns.    Physical exam:  GEN: This is a well developed, well-nourished male in no acute distress, in a pleasant mood.    SKIN: Focused examination of the scalp, face, back, and dorsal arms was performed.  - Lesion of concern on R scalp is light pink waxy stuck on papule with overlying hemorrhagic crust.  - Declines FBSE today  - No other lesions of concern on areas examined.     Impression/Plan:  1. 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 The differential diagnosis includes irritated SK v SCC. Offered empiric cryo versus biopsy today and patient would prefer biopsy.     Shave biopsy:  After discussion of benefits and risks including but not limited to bleeding/bruising, pain/swelling, infection, scar, incomplete removal, nerve damage/numbness, recurrence, and non-diagnostic biopsy, written consent, verbal consent and photographs were obtained. Time-out was performed. The area was cleaned with isopropyl alcohol.  was injected to  obtain adequate anesthesia of the lesion on the R scalp. 1 ml of 1% lidocaine was injected to obtain adequate anesthesia. A  shave biopsy was performed. Hemostasis was achieved with aluminium chloride. Vaseline and a sterile dressing were applied. The patient tolerated the procedure and no complications were noted. The patient was provided with verbal and written post care instructions.        Follow-up in 9 months for FBSE (winter preferred time for patient), earlier for new or changing lesions or sooner pending pathology results.    Dr. Alley Garza staffed the patient.    Staff Involved:  Resident(Yuni Robbins)/Staff(as above)    Patient was seen and examined with the medicine/dermatology resident. I agree with the history, review of systems, physical examination, assessments and plan. I was present for the key portion of the biopsy procedure.     Alley Garza MD  Professor and  Chair  Department of Dermatology  [facility]        Pictures were placed in Pt's chart today for future reference.

## 2018-03-29 NOTE — PATIENT INSTRUCTIONS

## 2018-03-31 LAB — COPATH REPORT: NORMAL

## 2018-04-09 ENCOUNTER — DOCUMENTATION ONLY (OUTPATIENT)
Dept: VASCULAR SURGERY | Facility: CLINIC | Age: 68
End: 2018-04-09

## 2018-04-09 DIAGNOSIS — Z13.6 ENCOUNTER FOR ABDOMINAL AORTIC ANEURYSM (AAA) SCREENING: Primary | ICD-10-CM

## 2018-06-28 NOTE — PROGRESS NOTES
Order(s) created erroneously. Erroneous order ID: [id number]   Order canceled by: LAY MURRAY   Order cancel date/time: 06/28/2018 1:09 PM

## 2018-08-09 ENCOUNTER — OFFICE VISIT (OUTPATIENT)
Dept: FAMILY MEDICINE | Facility: CLINIC | Age: 68
End: 2018-08-09
Payer: MEDICARE

## 2018-08-09 VITALS
RESPIRATION RATE: 18 BRPM | HEIGHT: 68 IN | WEIGHT: 204 LBS | TEMPERATURE: 97.7 F | HEART RATE: 57 BPM | OXYGEN SATURATION: 95 % | DIASTOLIC BLOOD PRESSURE: 76 MMHG | BODY MASS INDEX: 30.92 KG/M2 | SYSTOLIC BLOOD PRESSURE: 132 MMHG

## 2018-08-09 DIAGNOSIS — L23.7 ALLERGIC CONTACT DERMATITIS DUE TO PLANTS, EXCEPT FOOD: Primary | ICD-10-CM

## 2018-08-09 PROCEDURE — 99213 OFFICE O/P EST LOW 20 MIN: CPT | Performed by: FAMILY MEDICINE

## 2018-08-09 RX ORDER — PREDNISONE 20 MG/1
20 TABLET ORAL
Qty: 6 TABLET | Refills: 0 | Status: SHIPPED | OUTPATIENT
Start: 2018-08-09 | End: 2019-08-01

## 2018-08-09 RX ORDER — TRIAMCINOLONE ACETONIDE 5 MG/G
CREAM TOPICAL
Qty: 30 G | Refills: 1 | Status: SHIPPED | OUTPATIENT
Start: 2018-08-09 | End: 2018-09-18

## 2018-08-09 ASSESSMENT — PAIN SCALES - GENERAL: PAINLEVEL: NO PAIN (0)

## 2018-08-09 NOTE — PATIENT INSTRUCTIONS
At [facility], we strive to deliver an exceptional experience to you, every time we see you.  If you receive a survey in the mail, please send us back your thoughts. We really do value your feedback.    Based on your medical history, these are the current health maintenance/preventive care services that you are due for (some may have been done at this visit.)  There are no preventive care reminders to display for this patient.      Suggested websites for health information:  Www.[place].org : Up to date and easily searchable information on multiple topics.  Www.medlineplus.gov : medication info, interactive tutorials, watch real surgeries online  Www.familydoctor.org : good info from the Academy of Family Physicians  Www.cdc.gov : public health info, travel advisories, epidemics (H1N1)  Www.aap.org : children's health info, normal development, vaccinations  Www.health.[facility].[place].us : [place] dept of health, public health issues in [place], N1N1    Your care team:                            Family Medicine Internal Medicine   MD Ortiz Carson MD Shantel Branch-Fleming, MD Katya Georgiev PA-C Nam Ho, MD Pediatrics   MATHIEU Lainez, CAMPOS Blake APRCA CNP   MD Paulette Ang MD Deborah Mielke, MD Kim Thein, APRN CNP      Clinic hours: Monday - Thursday 7 am-7 pm; Fridays 7 am-5 pm.   Urgent care: Monday - Friday 11 am-9 pm; Saturday and Sunday 9 am-5 pm.  Pharmacy : Monday -Thursday 8 am-8 pm; Friday 8 am-6 pm; Saturday and Sunday 9 am-5 pm.     Clinic: [phone]   Pharmacy: [phone]

## 2018-08-09 NOTE — PROGRESS NOTES
"  SUBJECTIVE:   Florencio Doshi is a 68 year old male who presents to clinic today for the following health issues:      Rash  Onset: 1week    Description:   Location: bilateral lower arms  Character: flakey, burning, red  Itching (Pruritis): YES    Progression of Symptoms:  worsening    Accompanying Signs & Symptoms:  Fever: no   Body aches or joint pain: no   Sore throat symptoms: no   Recent cold symptoms: no     History:   Previous similar rash: no     Precipitating factors:   Exposure to similar rash: no   New exposures: None   Recent travel: YES- cutting trees[place] the [place]    Alleviating factors:  Cold water, Cortizone-10, Calahist lotion    Therapies Tried and outcome: Cortizone-10 helped a little reduce the rash a little bit, Calahist lotion did not help made more itchy, cold water helped more    Past medical, family, and social histories, medications, and allergies are reviewed and updated in Epic.     ROS:  Constitutional, HEENT, cardiovascular, pulmonary, , musculoskeletal, neuro, skin, endocrine and psych systems are negative, except as otherwise noted. Patient has rash on left arm that started one week ago. The rash occurred while the patient was in the woods.    This document serves as a record of the services and decisions personally performed and made by Dr. Wright. It was created on his behalf by Babatunde Gilmore, a trained medical scribe. The creation of this document is based the provider's statements to the medical scribe.  Babatunde Gilmore August 9, 2018 11:08 AM      OBJECTIVE:     /83 (BP Location: Left arm, Patient Position: Chair, Cuff Size: Adult Regular)  Pulse 57  Temp 97.7  F (36.5  C) (Oral)  Resp 18  Ht 5' 7.5\" (1.715 m)  Wt 204 lb (92.5 kg)  SpO2 95%  BMI 31.48 kg/m2  Body mass index is 31.48 kg/(m^2).  GENERAL: healthy, alert and no distress  NECK: no adenopathy, no asymmetry, masses, or scars and thyroid normal to palpation  RESP: lungs clear to auscultation - no " rales, rhonchi or wheezes  CV: regular rate and rhythm, normal S1 S2, no S3 or S4, no murmur, click or rub, no peripheral edema and peripheral pulses strong  ABDOMEN: soft, nontender, no hepatosplenomegaly, no masses and bowel sounds normal  MS: no gross musculoskeletal defects noted, no edema  SKIN: diffuse nonspecific erythematous cholo lateral/posterior surface ofleft forearm. Similar but milder patches on right forearm.        ASSESSMENT/PLAN:     ASSESSMENT/PLAN:  (L23.7) Allergic contact dermatitis due to plants, except food  (primary encounter diagnosis)  Comment: Patient has erythema on the left arm.  Plan: Patient to use Prednisone 20 mg and Kenalog 0.5%.    The information in this document, created by the medical scribe for me, accurately reflects the services I personally performed and the decisions made by me. I have reviewed and approved this document for accuracy prior to leaving the patient care area.  Ant Wright MD

## 2018-08-09 NOTE — MR AVS SNAPSHOT
After Visit Summary   8/9/2018    Florencio Doshi    MRN: [id number]           Patient Information     Date Of Birth          1950        Visit Information        Provider Department      8/9/2018 10:00 AM Ant Wright MD [facility]        Today's Diagnoses     Allergic contact dermatitis due to plants, except food    -  1      Care Instructions    At [facility], we strive to deliver an exceptional experience to you, every time we see you.  If you receive a survey in the mail, please send us back your thoughts. We really do value your feedback.    Based on your medical history, these are the current health maintenance/preventive care services that you are due for (some may have been done at this visit.)  There are no preventive care reminders to display for this patient.      Suggested websites for health information:  Www.CARDFREE.[employer] : Up to date and easily searchable information on multiple topics.  Www.medlineplus.gov : medication info, interactive tutorials, watch real surgeries online  Www.familydoctor.org : good info from the Academy of Family Physicians  Www.cdc.gov : public health info, travel advisories, epidemics (H1N1)  Www.aap.org : children's health info, normal development, vaccinations  Www.health.[facility].[place].us : [place] dept of health, public health issues in [place], N1N1    Your care team:                            Family Medicine Internal Medicine   MD Ortiz Carson MD Shantel Branch-Fleming, MD Katya Georgiev PA-C Nam Ho, MD Pediatrics   MATHIEU Lainez, MD Paulette Johnson CNP, MD Deborah Mielke, MD Kim Thein, APRN [facility]      Clinic hours: Monday - Thursday 7 am-7 pm; Fridays 7 am-5 pm.   Urgent care: Monday - Friday 11 am-9 pm; Saturday and Sunday 9 am-5 pm.  Pharmacy : Monday -Thursday 8 am-8 pm; Friday 8 am-6 pm; Saturday and Sunday 9 am-5 pm.  "    Clinic: [phone]   Pharmacy: [phone]            Follow-ups after your visit        Follow-up notes from your care team     Return in about 6 weeks (around 2018) for full physical, lab tests, recheck medications.      Who to contact     If you have questions or need follow up information about today's clinic visit or your schedule please contact [facility] [place] directly at [phone].  Normal or non-critical lab and imaging results will be communicated to you by MyChart, letter or phone within 4 business days after the clinic has received the results. If you do not hear from us within 7 days, please contact the clinic through MyChart or phone. If you have a critical or abnormal lab result, we will notify you by phone as soon as possible.  Submit refill requests through [employer] [facility] or call your pharmacy and they will forward the refill request to us. Please allow 3 business days for your refill to be completed.          Additional Information About Your Visit        [employer] Vital Records Information     [employer] [facility] lets you send messages to your doctor, view your test results, renew your prescriptions, schedule appointments and more. To sign up, go to [email or web address] . Click on \"Log in\" on the left side of the screen, which will take you to the Welcome page. Then click on \"Sign up Now\" on the right side of the page.     You will be asked to enter the access code listed below, as well as some personal information. Please follow the directions to create your username and password.     Your access code is: [email or web address]  Expires: 2018 10:38 AM     Your access code will  in 90 days. If you need help or a new code, please call your [place] clinic or [phone].        Care EveryWhere ID     This is your Care EveryWhere ID. This could be used by other organizations to access your [place] medical records  [id number]        Your Vitals Were     Pulse Temperature Respirations Height " "Pulse Oximetry BMI (Body Mass Index)    57 97.7  F (36.5  C) (Oral) 18 5' 7.5\" (1.715 m) 95% 31.48 kg/m2       Blood Pressure from Last 3 Encounters:   08/09/18 132/76   03/22/18 136/84   10/19/17 (!) 129/91    Weight from Last 3 Encounters:   08/09/18 204 lb (92.5 kg)   03/28/18 207 lb (93.9 kg)   03/22/18 207 lb (93.9 kg)              Today, you had the following     No orders found for display         Today's Medication Changes          These changes are accurate as of 8/9/18 10:38 AM.  If you have any questions, ask your nurse or doctor.               Start taking these medicines.        Dose/Directions    predniSONE 20 MG tablet   Commonly known as:  DELTASONE   Used for:  Allergic contact dermatitis due to plants, except food   Started by:  Ant Wright MD        Dose:  20 mg   Take 1 tablet (20 mg) by mouth daily with food for 6 days   Quantity:  6 tablet   Refills:  0       triamcinolone 0.5 % cream   Commonly known as:  KENALOG   Used for:  Allergic contact dermatitis due to plants, except food   Started by:  Ant Wright MD        Apply sparingly to affected area three times daily.   Quantity:  30 g   Refills:  1            Where to get your medicines      These medications were sent to [facility] - [place], [place] - [address] AT [facility] [address], [facility] [address]     Phone:  [phone]     predniSONE 20 MG tablet    triamcinolone 0.5 % cream                Primary Care Provider Office Phone # Fax #    Ant Wright [phone]       [address]  [facility] [address]        Equal Access to Services     [facility] RUBIA AH: Sherlyn peterso Soteressa, waaxda luqadaha, qaybta kaalmada adeegyada, cara tirado. So [facility] [phone].    ATENCIÓN: Si habla español, tiene a marvin disposición servicios gratuitos de asistencia lingüística. [place] al [phone].    We comply with applicable " federal civil rights laws and [place] laws. We do not discriminate on the basis of race, color, national origin, age, disability, sex, sexual orientation, or gender identity.            Thank you!     Thank you for choosing [facility]  for your care. Our goal is always to provide you with excellent care. Hearing back from our patients is one way we can continue to improve our services. Please take a few minutes to complete the written survey that you may receive in the mail after your visit with us. Thank you!             Your Updated Medication List - Protect others around you: Learn how to safely use, store and throw away your medicines at www.disposemymeds.org.          This list is accurate as of 8/9/18 10:38 AM.  Always use your most recent med list.                   Brand Name Dispense Instructions for use Diagnosis    aspirin 81 MG tablet      1 TABLET DAILY*        atorvastatin 40 MG tablet    LIPITOR    90 tablet    Take 1 tablet (40 mg) by mouth daily for cholesterol.    Atherosclerosis of native artery of left lower extremity, with unspecified presence of clinical manifestation (H), Atherosclerosis of aorta (H)       diclofenac 50 MG EC tablet    VOLTAREN    60 tablet    Take 1 tablet (50 mg) by mouth 3 times daily as needed for joint pain. Take with food.    Pain and swelling of right ankle       fish oil-omega-3 fatty acids 1000 MG capsule      Take 1 capsule by mouth Every other day.        lisinopril 10 MG tablet    PRINIVIL/ZESTRIL    90 tablet    Take 1 tablet (10 mg) by mouth daily for blood pressure.    Essential hypertension with goal blood pressure less than 140/90       predniSONE 20 MG tablet    DELTASONE    6 tablet    Take 1 tablet (20 mg) by mouth daily with food for 6 days    Allergic contact dermatitis due to plants, except food       sildenafil 20 MG tablet    REVATIO    50 tablet    Take 1-3 tablets (20-60 mg) by mouth daily as needed , 1-3 hours before intimacy.  Never use with nitroglycerin, terazosin or doxazosin.    Erectile dysfunction, unspecified erectile dysfunction type       triamcinolone 0.5 % cream    KENALOG    30 g    Apply sparingly to affected area three times daily.    Allergic contact dermatitis due to plants, except food       vitamin D 1000 units capsule      Take 1 capsule by mouth daily

## 2018-09-18 ENCOUNTER — OFFICE VISIT (OUTPATIENT)
Dept: FAMILY MEDICINE | Facility: CLINIC | Age: 68
End: 2018-09-18
Payer: MEDICARE

## 2018-09-18 VITALS
HEART RATE: 54 BPM | SYSTOLIC BLOOD PRESSURE: 138 MMHG | DIASTOLIC BLOOD PRESSURE: 74 MMHG | OXYGEN SATURATION: 96 % | TEMPERATURE: 97.6 F | RESPIRATION RATE: 16 BRPM | WEIGHT: 205.4 LBS | HEIGHT: 68 IN | BODY MASS INDEX: 31.13 KG/M2

## 2018-09-18 DIAGNOSIS — I70.202 ATHEROSCLEROSIS OF NATIVE ARTERY OF LEFT LOWER EXTREMITY, WITH UNSPECIFIED PRESENCE OF CLINICAL MANIFESTATION (H): ICD-10-CM

## 2018-09-18 DIAGNOSIS — Z23 NEED FOR PROPHYLACTIC VACCINATION AND INOCULATION AGAINST INFLUENZA: ICD-10-CM

## 2018-09-18 DIAGNOSIS — I70.0 ATHEROSCLEROSIS OF AORTA (H): ICD-10-CM

## 2018-09-18 DIAGNOSIS — Z00.00 ENCOUNTER FOR ROUTINE ADULT HEALTH EXAMINATION WITHOUT ABNORMAL FINDINGS: Primary | ICD-10-CM

## 2018-09-18 DIAGNOSIS — Z23 NEED FOR SHINGLES VACCINE: ICD-10-CM

## 2018-09-18 DIAGNOSIS — I10 ESSENTIAL HYPERTENSION WITH GOAL BLOOD PRESSURE LESS THAN 140/90: ICD-10-CM

## 2018-09-18 LAB
ALT SERPL W P-5'-P-CCNC: 66 U/L (ref 0–70)
ANION GAP SERPL CALCULATED.3IONS-SCNC: 10 MMOL/L (ref 3–14)
BUN SERPL-MCNC: 22 MG/DL (ref 7–30)
CALCIUM SERPL-MCNC: 8.9 MG/DL (ref 8.5–10.1)
CHLORIDE SERPL-SCNC: 105 MMOL/L (ref 94–109)
CHOLEST SERPL-MCNC: 116 MG/DL
CO2 SERPL-SCNC: 25 MMOL/L (ref 20–32)
CREAT SERPL-MCNC: 1.03 MG/DL (ref 0.66–1.25)
GFR SERPL CREATININE-BSD FRML MDRD: 72 ML/MIN/1.7M2
GLUCOSE SERPL-MCNC: 98 MG/DL (ref 70–99)
HDLC SERPL-MCNC: 52 MG/DL
LDLC SERPL CALC-MCNC: 37 MG/DL
NONHDLC SERPL-MCNC: 64 MG/DL
POTASSIUM SERPL-SCNC: 4.3 MMOL/L (ref 3.4–5.3)
SODIUM SERPL-SCNC: 140 MMOL/L (ref 133–144)
TRIGL SERPL-MCNC: 133 MG/DL

## 2018-09-18 PROCEDURE — 80061 LIPID PANEL: CPT | Performed by: FAMILY MEDICINE

## 2018-09-18 PROCEDURE — G0439 PPPS, SUBSEQ VISIT: HCPCS | Performed by: FAMILY MEDICINE

## 2018-09-18 PROCEDURE — 36415 COLL VENOUS BLD VENIPUNCTURE: CPT | Performed by: FAMILY MEDICINE

## 2018-09-18 PROCEDURE — 84460 ALANINE AMINO (ALT) (SGPT): CPT | Performed by: FAMILY MEDICINE

## 2018-09-18 PROCEDURE — 80048 BASIC METABOLIC PNL TOTAL CA: CPT | Performed by: FAMILY MEDICINE

## 2018-09-18 PROCEDURE — 90662 IIV NO PRSV INCREASED AG IM: CPT | Performed by: FAMILY MEDICINE

## 2018-09-18 PROCEDURE — G0008 ADMIN INFLUENZA VIRUS VAC: HCPCS | Performed by: FAMILY MEDICINE

## 2018-09-18 RX ORDER — LISINOPRIL 20 MG/1
20 TABLET ORAL DAILY
Qty: 90 TABLET | Refills: 1 | Status: SHIPPED | OUTPATIENT
Start: 2018-09-18 | End: 2019-03-05

## 2018-09-18 RX ORDER — ATORVASTATIN CALCIUM 40 MG/1
40 TABLET, FILM COATED ORAL DAILY
Qty: 90 TABLET | Refills: 1 | Status: SHIPPED | OUTPATIENT
Start: 2018-09-18 | End: 2019-03-05

## 2018-09-18 ASSESSMENT — PAIN SCALES - GENERAL: PAINLEVEL: NO PAIN (0)

## 2018-09-18 NOTE — MR AVS SNAPSHOT
After Visit Summary   9/18/2018    Florencio Doshi    MRN: [id number]           Patient Information     Date Of Birth          1950        Visit Information        Provider Department      9/18/2018 9:40 AM Ant Wright MD [facility]        Today's Diagnoses     Encounter for routine adult health examination without abnormal findings    -  1    Atherosclerosis of native artery of left lower extremity, with unspecified presence of clinical manifestation (H)        Atherosclerosis of aorta (H)        Essential hypertension with goal blood pressure less than 140/90        Need for prophylactic vaccination and inoculation against influenza        Need for shingles vaccine          Care Instructions      Preventive Health Recommendations:   Male Ages 65 and over    Yearly exam:             See your health care provider every year in order to  o   Review health changes.   o   Discuss preventive care.    o   Review your medicines if your doctor has prescribed any.    Talk with your health care provider about whether you should have a test to screen for prostate cancer (PSA).    Every 3 years, have a diabetes test (fasting glucose). If you are at risk for diabetes, you should have this test more often.    Every 5 years, have a cholesterol test. Have this test more often if you are at risk for high cholesterol or heart disease.     Every 10 years, have a colonoscopy. Or, have a yearly FIT test (stool test). These exams will check for colon cancer.    Talk to with your health care provider about screening for Abdominal Aortic Aneurysm if you have a family history of AAA or have a history of smoking.    Shots:     Get a flu shot each year.     Get a tetanus shot every 10 years.     Talk to your doctor about your pneumonia vaccines. There are now two you should receive - Pneumovax (PPSV 23) and Prevnar (PCV 13).     Talk to your pharmacist about a shingles vaccine.     Talk to your  doctor about the hepatitis B vaccine.  Nutrition:     Eat at least 5 servings of fruits and vegetables each day.     Eat whole-grain bread, whole-wheat pasta and brown rice instead of white grains and rice.     Get adequate Calcium and Vitamin D.   Lifestyle    Exercise for at least 150 minutes a week (30 minutes a day, 5 days a week). This will help you control your weight and prevent disease.     Limit alcohol to one drink per day.     No smoking.     Wear sunscreen to prevent skin cancer.     See your dentist every six months for an exam and cleaning.     See your eye doctor every 1 to 2 years to screen for conditions such as glaucoma, macular degeneration, cataracts, etc           Follow-ups after your visit        Follow-up notes from your care team     Return in about 6 months (around 3/18/2019) for cholesterol, blood pressure check.      Your next 10 appointments already scheduled     Dec 13, 2018  9:00 AM CST   (Arrive by 8:45 AM)   Return Visit with Yuni Robbins MD   [facility] ([facility])    [address] [place] [address] [phone]              Who to contact     If you have questions or need follow up information about today's clinic visit or your schedule please contact [facility] directly at [phone].  Normal or non-critical lab and imaging results will be communicated to you by MyChart, letter or phone within 4 business days after the clinic has received the results. If you do not hear from us within 7 days, please contact the clinic through MyChart or phone. If you have a critical or abnormal lab result, we will notify you by phone as soon as possible.  Submit refill requests through [employer] or call your pharmacy and they will forward the refill request to us. Please allow 3 business days for your refill to be completed.          Additional Information About Your Visit        MyChart Information      "[facility] lets you send messages to your doctor, view your test results, renew your prescriptions, schedule appointments and more. To sign up, go to [email or web address] . Click on \"Log in\" on the left side of the screen, which will take you to the Welcome page. Then click on \"Sign up Now\" on the right side of the page.     You will be asked to enter the access code listed below, as well as some personal information. Please follow the directions to create your username and password.     Your access code is: [email or web address]  Expires: 2018 10:38 AM     Your access code will  in 90 days. If you need help or a new code, please call your [place] clinic or [phone].        Care EveryWhere ID     This is your Care EveryWhere ID. This could be used by other organizations to access your [place] medical records  [id number]        Your Vitals Were     Pulse Temperature Respirations Height Pulse Oximetry BMI (Body Mass Index)    54 97.6  F (36.4  C) (Oral) 16 5' 7.5\" (1.715 m) 96% 31.7 kg/m2       Blood Pressure from Last 3 Encounters:   18 138/74   18 132/76   18 136/84    Weight from Last 3 Encounters:   18 205 lb 6.4 oz (93.2 kg)   18 204 lb (92.5 kg)   18 207 lb (93.9 kg)              We Performed the Following     ALT     Basic metabolic panel     FLU VACCINE, INCREASED ANTIGEN, PRESV FREE, AGE 65+ [14853]     Lipid panel reflex to direct LDL Non-fasting     Vaccine Administration, Initial [50904]          Today's Medication Changes          These changes are accurate as of 18 10:27 AM.  If you have any questions, ask your nurse or doctor.               These medicines have changed or have updated prescriptions.        Dose/Directions    lisinopril 20 MG tablet   Commonly known as:  PRINIVIL/ZESTRIL   This may have changed:    - medication strength  - how much to take   Used for:  Essential hypertension with goal blood pressure less than 140/90   Changed by:  Kyle " Ant STONE MD        Dose:  20 mg   Take 1 tablet (20 mg) by mouth daily for blood pressure.   Quantity:  90 tablet   Refills:  1            Where to get your medicines      These medications were sent to [facility] - [place], [place] - [address] AT [facility] [address], [place] [address]     Phone:  [phone]     atorvastatin 40 MG tablet    lisinopril 20 MG tablet                Primary Care Provider Office Phone # Fax #    Ant Wright [phone]       [address]  [facility] [address]        Equal Access to Services     PECA[facility]ARK[facility] AH: Hadii aad ku hadasho Soomaali, waaxda luqadaha, qaybta kaalmada adeegyada, waxay idiin haycomfortn adama meadows . So [facility] [phone].    ATENCIÓN: Si habla español, tiene a marvin disposición servicios gratuitos de asistencia lingüística. [facility] [phone].    We comply with applicable federal civil rights laws and [place] laws. We do not discriminate on the basis of race, color, national origin, age, disability, sex, sexual orientation, or gender identity.            Thank you!     Thank you for choosing [facility]  for your care. Our goal is always to provide you with excellent care. Hearing back from our patients is one way we can continue to improve our services. Please take a few minutes to complete the written survey that you may receive in the mail after your visit with us. Thank you!             Your Updated Medication List - Protect others around you: Learn how to safely use, store and throw away your medicines at www.disposemymeds.org.          This list is accurate as of 9/18/18 10:27 AM.  Always use your most recent med list.                   Brand Name Dispense Instructions for use Diagnosis    aspirin 81 MG tablet      1 TABLET DAILY*        atorvastatin 40 MG tablet    LIPITOR    90 tablet    Take 1 tablet (40 mg) by mouth daily for cholesterol.     Atherosclerosis of native artery of left lower extremity, with unspecified presence of clinical manifestation (H), Atherosclerosis of aorta (H)       diclofenac 50 MG EC tablet    VOLTAREN    60 tablet    Take 1 tablet (50 mg) by mouth 3 times daily as needed for joint pain. Take with food.    Pain and swelling of right ankle       fish oil-omega-3 fatty acids 1000 MG capsule      Take 1 capsule by mouth Every other day.        lisinopril 20 MG tablet    PRINIVIL/ZESTRIL    90 tablet    Take 1 tablet (20 mg) by mouth daily for blood pressure.    Essential hypertension with goal blood pressure less than 140/90       vitamin D 1000 units capsule      Take 1 capsule by mouth daily

## 2018-09-18 NOTE — PROGRESS NOTES
SUBJECTIVE:   Florencio Doshi is a 68 year old male who presents for Preventive Visit.    Are you in the first 12 months of your Medicare Part B coverage? no    Healthy Habits:    Do you get at least three servings of calcium containing foods daily (dairy, green leafy vegetables, etc.)? yes    Amount of exercise or daily activities, outside of work: 5 days weekly    Problems taking medications regularly: no    Medication side effects: no    Have you had an eye exam in the past two years? no    Do you see a dentist twice per year? yes    Do you have sleep apnea, excessive snoring or daytime drowsiness? no      Ability to successfully perform activities of daily living: Yes, no assistance needed    Home safety:  none identified     Hearing impairment: no    Fall risk:  Fallen 2 or more times in the past year?: No  Any fall with injury in the past year?: No      COGNITIVE SCREEN  1) Repeat 3 items (Leader, Season, Table)    2) Clock draw: ABNORMAL - wrong time (repeat testing he correctly puts the hands for 10:05)  3) 3 item recall: Recalls 1 object (recalls all 3 in a different order much later in the visit)  Results: ABNORMAL clock, 1-2 items recalled: PROBABLE COGNITIVE IMPAIRMENT, **INFORM PROVIDER**    Mini-CogTM Copyright S Merrick. Licensed by the author for use in [facility]; reprinted with permission (sokd@[facility]). All rights reserved.            Social History   Substance Use Topics     Smoking status: Never Smoker     Smokeless tobacco: Never Used     Alcohol use Yes      Comment: occassionally       If you drink alcohol do you typically have >3 drinks per day or >7 drinks per week? No                        Today's PHQ-2 Score:   PHQ-2 ( 1999 [employer]) 9/18/2018 8/9/2018   Q1: Little interest or pleasure in doing things 0 0   Q2: Feeling down, depressed or hopeless 0 0   PHQ-2 Score 0 0     Do you feel safe in your environment - Yes    Do you have a Health Care Directive?: Yes: Patient states  "has Advance Directive and will bring in a copy to clinic.    Current providers sharing in care for this patient include:   Patient Care Team:  Ant Wright MD as PCP - General  Ant Wright MD as MD (Family Practice)  Yuni Robbins MD as MD (Internal Medicine)    The following health maintenance items are reviewed in Epic and correct as of today:  Health Maintenance   Topic Date Due     INFLUENZA VACCINE (1) 09/01/2018     LIPID MONITORING Q1 YEAR  03/22/2019     FALL RISK ASSESSMENT  03/29/2019     PHQ-2 Q1 YR  08/09/2019     ADVANCE DIRECTIVE PLANNING Q5 YRS  09/08/2021     TETANUS IMMUNIZATION (SYSTEM ASSIGNED)  06/19/2022     COLON CANCER SCREEN (SYSTEM ASSIGNED)  10/19/2027     PNEUMOCOCCAL  Completed     AORTIC ANEURYSM SCREENING (SYSTEM ASSIGNED)  Completed     HEPATITIS C SCREENING  Completed     ROS:  Constitutional, HEENT, cardiovascular, pulmonary, GI, , musculoskeletal, neuro, skin, endocrine and psych systems are negative, except as otherwise noted.    This document serves as a record of the services and decisions personally performed and made by Dr. Wright. It was created on his behalf by Caty Quinonez, a trained medical scribe. The creation of this document is based the provider's statements to the medical scribe.  Caty Quinonez September 18, 2018 10:11 AM     OBJECTIVE:   /74  Pulse 54  Temp 97.6  F (36.4  C) (Oral)  Resp 16  Ht 1.715 m (5' 7.5\")  Wt 93.2 kg (205 lb 6.4 oz)  SpO2 96%  BMI 31.7 kg/m2 Estimated body mass index is 31.7 kg/(m^2) as calculated from the following:    Height as of this encounter: 1.715 m (5' 7.5\").    Weight as of this encounter: 93.2 kg (205 lb 6.4 oz).    EXAM:   GENERAL: healthy, alert and no distress  EYES: Eyes grossly normal to inspection, PERRL and conjunctivae and sclerae normal  HENT: ear canals and TM's normal, nose and mouth without ulcers or lesions  NECK: no adenopathy, no asymmetry, masses, or scars and thyroid normal to " palpation  RESP: lungs clear to auscultation - no rales, rhonchi or wheezes  CV: regular rate and rhythm, normal S1 S2, no S3 or S4, no murmur, click or rub, no peripheral edema and peripheral pulses strong  ABDOMEN: soft, nontender, no hepatosplenomegaly, no masses and bowel sounds normal   (male): normal male genitalia without lesions or urethral discharge, no hernia, spermatocele right (previously identified)  MS: no gross musculoskeletal defects noted, no edema  SKIN: no suspicious lesions  NEURO: Normal strength and tone, sensory exam grossly normal, mentation intact, cranial nerves 2-12 intact and DTR's normal and symmetric   PSYCH: mentation appears normal, affect normal/bright    Diagnostic Test Results:  none   ASSESSMENT / PLAN:   (Z00.00) Encounter for routine adult health examination without abnormal findings  (primary encounter diagnosis)  Comment: Negative screening exam; up-to-date on preventive services.   Plan: FLU VACCINE, INCREASED ANTIGEN, PRESV FREE, AGE        65+ [65075]        Follow up in 1 year for LOVE.    (I70.202) Atherosclerosis of native artery of left lower extremity, with unspecified presence of clinical manifestation (H)  (I70.0) Atherosclerosis of aorta (H)  Comment: fasting. historically at goal   Plan: atorvastatin (LIPITOR) 40 MG tablet, ALT, Lipid        panel reflex to direct LDL Non-fasting        Return in about 6 months (around 3/18/2019) for cholesterol, blood pressure check.     (I10) Essential hypertension with goal blood pressure less than 140/90  Comment: borderline controlled, so I will increase his dose to 20 mg  Plan: lisinopril (PRINIVIL/ZESTRIL) 20 MG tablet,         Basic metabolic panel        Follow up in 6 months.     (Z23) Need for prophylactic vaccination and inoculation against influenza  Comment: Influenza vaccine offered and accepted by patient. He has received it before without problems.   Plan: FLU VACCINE, INCREASED ANTIGEN, PRESV FREE, AGE        65+  "[21346], Vaccine Administration, Initial         [56992]       (Z23) Need for shingles vaccine  Comment:   Plan: VIS provided.     End of Life Planning:  Patient currently has an advanced directive: Yes.  Practitioner is supportive of decision.    COUNSELING:  Special attention given to:       Immunizations    Vaccinated for: Influenza      BP Readings from Last 1 Encounters:   09/18/18 138/74     Estimated body mass index is 31.7 kg/(m^2) as calculated from the following:    Height as of this encounter: 1.715 m (5' 7.5\").    Weight as of this encounter: 93.2 kg (205 lb 6.4 oz).  Weight management plan: see AVS for exercise recommendations. Patient reports he currently rides his bike 5 times per week.     reports that he has never smoked. He has never used smokeless tobacco.    Appropriate preventive services were discussed with this patient, including applicable screening as appropriate for cardiovascular disease, diabetes, osteopenia/osteoporosis, and glaucoma.  As appropriate for age/gender, discussed screening for colorectal cancer, prostate cancer, breast cancer, and cervical cancer. Checklist reviewing preventive services available has been given to the patient.    Reviewed patients plan of care and provided an AVS. The Basic Care Plan (routine screening as documented in Health Maintenance) for Florencio meets the Care Plan requirement. This Care Plan has been established and reviewed with the patient.    Counseling Resources:  ATP IV Guidelines  Pooled Cohorts Equation Calculator  Breast Cancer Risk Calculator  FRAX Risk Assessment  ICSI Preventive Guidelines  Dietary Guidelines for Americans, 2010  USDA's MyPlate  ASA Prophylaxis  Lung CA Screening    The information in this document, created by the medical scribe for me, accurately reflects the services I personally performed and the decisions made by me. I have reviewed and approved this document for accuracy prior to leaving the patient care area. September " 18, 2018 10:10 AM     Ant Wright MD  [facility]                          Injectable Influenza Immunization Documentation    1.  Is the person to be vaccinated sick today?   No    2. Does the person to be vaccinated have an allergy to a component   of the vaccine?   No  Egg Allergy Algorithm Link    3. Has the person to be vaccinated ever had a serious reaction   to influenza vaccine in the past?   No    4. Has the person to be vaccinated ever had Guillain-Barré syndrome?   No    Form completed by Maxi Delgadillo MA

## 2018-09-18 NOTE — LETTER
September 18, 2018        Florencio Doshi  [address] [place] [address]        Mr. Doshi,    All of your labs were normal.    Please contact the clinic if you have additional questions.  Thank you.    Sincerely,      Ant Wright MD/ofelia    Resulted Orders   ALT   Result Value Ref Range    ALT 66 0 - 70 U/L   Basic metabolic panel   Result Value Ref Range    Sodium 140 133 - 144 mmol/L    Potassium 4.3 3.4 - 5.3 mmol/L    Chloride 105 94 - 109 mmol/L    Carbon Dioxide 25 20 - 32 mmol/L    Anion Gap 10 3 - 14 mmol/L    Glucose 98 70 - 99 mg/dL      Comment:      Non Fasting    Urea Nitrogen 22 7 - 30 mg/dL    Creatinine 1.03 0.66 - 1.25 mg/dL    GFR Estimate 72 >60 mL/min/1.7m2      Comment:      Non  GFR Calc    GFR Estimate If Black 87 >60 mL/min/1.7m2      Comment:       GFR Calc    Calcium 8.9 8.5 - 10.1 mg/dL   Lipid panel reflex to direct LDL Non-fasting   Result Value Ref Range    Cholesterol 116 <200 mg/dL    Triglycerides 133 <150 mg/dL      Comment:      Non Fasting    HDL Cholesterol 52 >39 mg/dL    LDL Cholesterol Calculated 37 <100 mg/dL      Comment:      Desirable:       <100 mg/dl    Non HDL Cholesterol 64 <130 mg/dL

## 2018-12-13 ENCOUNTER — OFFICE VISIT (OUTPATIENT)
Dept: DERMATOLOGY | Facility: CLINIC | Age: 68
End: 2018-12-13
Payer: MEDICARE

## 2018-12-13 DIAGNOSIS — B07.9 VIRAL WARTS, UNSPECIFIED TYPE: ICD-10-CM

## 2018-12-13 DIAGNOSIS — L57.0 ACTINIC KERATOSIS: Primary | ICD-10-CM

## 2018-12-13 ASSESSMENT — PAIN SCALES - GENERAL
PAINLEVEL: MILD PAIN (2)
PAINLEVEL: NO PAIN (0)

## 2018-12-13 NOTE — NURSING NOTE
Dermatology Rooming Note    Florencio Doshi's goals for this visit include:   Chief Complaint   Patient presents with     Skin Check     No spots of concern today. No personal or family hx of skin cancer.     Jelnea Dickerson, CMA

## 2018-12-13 NOTE — PATIENT INSTRUCTIONS
We will treat some pre-cancer spots today with the liquid nitrogen (actinic keratoses).  We will also treat your wart.    Return in 1 year, sooner if concerns.  Watch the spot on your right mid-scalp.    Cryotherapy    What is it?    Use of a very cold liquid, such as liquid nitrogen, to freeze and destroy abnormal skin cells that need to be removed    What should I expect?    Tenderness and redness    A small blister that might grow and fill with dark purple blood. There may be crusting.    More than one treatment may be needed if the lesions do not go away.    How do I care for the treated area?    Gently wash the area with your hands when bathing.    Use a thin layer of Vaseline to help with healing. You may use a Band-Aid.     The area should heal within 7-10 days and may leave behind a pink or lighter color.     Do not use an antibiotic or Neosporin ointment.     You may take acetaminophen (Tylenol) for pain.     Call your Doctor if you have:    Severe pain    Signs of infection (warmth, redness, cloudy yellow drainage, and or a bad smell)    Questions or concerns    Who should I call with questions?       [facility]: [phone]       [facility]: [phone]       For urgent needs outside of business hours call the [facility] at [phone]        and ask for the dermatology resident on call      Topical Salicylic Acid Treatment    What is this medicine used for?    Treatment of warts at home    Brand name and generic salicylic acid products are available over the counter    The products are available as liquids or sticky patches    Some brand names are: Duofilm , Mediplast , Dr. Marcy lin , and Occlusol      When can I start using topical salicylic acid?    If you had treatment of your warts in clinic today wait about 1 week. The irritation or soreness will be gone.    Use the salicylic acid today if your warts were not treated in  clinic.    How do I use topical salicylic acid?    Soak warts in warm water for 5 to 10 minutes. You may use your daily shower to do this.    Pat the area dry with a towel.    You need to remove the dead skin over the warts. Gently use a pumice stone or emery board to remove the dead skin. Do not do this to the point of discomfort or bleeding    Note: Do not use the emery board or pumice stone for anything else. There is a chance of spreading the virus that causes warts.     Put 1 drop of the liquid or place a patch on each wart. Try not to get the medicine on the surrounding skin. Cover the treated areas with strong tape, or you can use Dr. Marcy lin  or other brand moleskin    Soak, remove dead skin, and apply the salicylic acid each day    If you have skin irritation, skip days in between treatments until the irritation resolves.  Then start the treatment again.

## 2018-12-13 NOTE — LETTER
12/13/2018       RE: Florencio Doshi  [address]  [place] [address]     Dear Colleague,    Thank you for referring your patient, Florencio Doshi, to the [facility] DERMATOLOGY at [facility]. Please see a copy of my visit note below.    [facility] Dermatology Note      Dermatology Problem List:  1. Benign bx:  - Inflamed SK, R scalp, bx 3/29/18  2. Verruca vulgaris, L index finger, cryo 12/13/2018.  - Yash acid nightly  # Lesion to monitor, R mid-frontal scalp, photo 12/13/2018.    Encounter Date: Dec 13, 2018    CC:   Chief Complaint   Patient presents with     Skin Check     No spots of concern today. No personal or family hx of skin cancer.         History of Present Illness:  Mr. Florencio Doshi is a 68 year old male who returns today for full body skin check.  Last evaluated 3/29/18 at which time an inflamed SK was biopsied on his right scalp.    Since his last visit, he has not noted any concerning lesions.  He has a few brown spots he would like looked at today but they are asymptomatic.  He notes a possible wart on his left hand.  He has been using Compound W at home and is not sure if it is helping.  No additional skin concerns.  Otherwise usual state of health.  No changing, nonhealing, bleeding, or otherwise symptomatic lesions.    Past Medical History:   Patient Active Problem List   Diagnosis     Atherosclerosis of aorta (H)     Atherosclerosis of native artery of left lower extremity (H)     CARDIOVASCULAR SCREENING; LDL GOAL LESS THAN 100     Psoriasis     Advance care planning     Gynecomastia     Impaired fasting glucose     Obesity, Class I, BMI 30-34.9     NAFLD (nonalcoholic fatty liver disease)     Elevated transaminase level     Essential hypertension with goal blood pressure less than 140/90     Past Medical History:   Diagnosis Date     Atherosclerosis of aorta (H) 9/17/2010     BPH      CARDIOVASCULAR SCREENING; LDL GOAL  LESS THAN 100 10/31/2010    no hx of hyperlipidemia     Dislocation of elbow, left, open 6/19/12     Diverticulosis of colon     pan-     Elevated blood pressure reading without diagnosis of hypertension 9/1/2015     Elevated PSA      Epididymal cyst     RT     Essential hypertension with goal blood pressure less than 140/90      Fall from ladder 6/19/12     Hand eczema      Open fracture of left distal radius 6/19/12     Other atherosclerosis of native arteries of the extremities 9/17/2010     Psoriasis     palms     Pyelonephritis 1964     Past Surgical History:   Procedure Laterality Date     C HAND/FINGER SURGERY UNLISTED       COLONOSCOPY WITH CO2 INSUFFLATION N/A 10/19/2017    Procedure: COLONOSCOPY WITH CO2 INSUFFLATION;  COLON SCREEN/ COHEN;  Surgeon: Tristan Tipton MD;  Location: [facility] OR     HC REPAIR FINGER/HAND FLEXOR NOT ZONE 2, EACH  1991    LT index     OPEN REDUCTION INTERNAL FIXATION ELBOW  6/19/12    LT medial tendon/ligament repair, Dr. Jameel Tian     OPEN REDUCTION INTERNAL FIXATION FOREARM  6/19/12    LT distal radius, Dr. Jameel Tian     PROSTATE BIOPSY, NEEDLE, SATURATION SAMPLING  7/31/07    Dr. Waterman       Social History:  Patient is .    Family History:  Family History   Problem Relation Age of Onset     Cancer Sister         uterine or ovarian     Hypertension Brother      Aneurysm Paternal Uncle         AAA     Heart Disease Paternal Uncle      Prostate Cancer Maternal Uncle      Cancer Maternal Aunt         bladder or ureter?     Hypertension Mother      Arthritis Paternal Grandfather         gout     Aneurysm Paternal Grandfather         AAA     Kidney Disease Brother 63        kidney stone, hematuria     Deep Vein Thrombosis Brother 65     Myocardial Infarction Cousin 64     Pancreatic Cancer Maternal Aunt      Alzheimer Disease Paternal Uncle 86     Melanoma No family hx of      Skin Cancer No family hx of        Medications:  Current Outpatient Medications   Medication  Sig Dispense Refill     ASPIRIN 81 MG PO TABS 1 TABLET DAILY*       atorvastatin (LIPITOR) 40 MG tablet Take 1 tablet (40 mg) by mouth daily for cholesterol. 90 tablet 1     Cholecalciferol (VITAMIN D) 1000 UNITS capsule Take 1 capsule by mouth daily       diclofenac (VOLTAREN) 50 MG EC tablet Take 1 tablet (50 mg) by mouth 3 times daily as needed for joint pain. Take with food. 60 tablet 1     fish oil-omega-3 fatty acids 1000 MG capsule Take 1 capsule by mouth Every other day.        lisinopril (PRINIVIL/ZESTRIL) 20 MG tablet Take 1 tablet (20 mg) by mouth daily for blood pressure. 90 tablet 1        Allergies   Allergen Reactions     Clindamycin Rash         Review of Systems:  -Skin: The patient denies any new rash, pruritus, or lesions that are symptomatic, changing or bleeding, except as per HPI.  -Constitutional: Otherwise in usual state of health.    Physical exam:  GEN: This is a well developed, well-nourished male in no acute distress, in a pleasant mood.    SKIN: Full body skin exam excluding the genitals was performed including face, scalp, neck, ears, chest, back, bilateral arms, hands, bilateral legs, feet, and buttocks.   - Gritty pink papule on vertex scalp and left dorsal hand.  - R mid-frontal scalp with faint light blue macule. Dermoscopy no true lesion, no pigment network, and unable to appreciate the blue hue on dermoscopy.  - Left 2nd finger with hyperkeratotic flesh-colored papule with central thrombosed capillaries.  - No other lesions of concern on areas examined.             Impression/Plan:  1. Actinic keratoses x2.  CRYOTHERAPY PROCEDURE NOTE: 2 lesions in the above locations were treated with liquid nitrogen utilizing a 5-10sec thaw time. Patient was advised that the treated areas will become red, swollen, may develop a blister and then should crust and peal off in the next 1-2 weeks. Post-procedure instructions were provided.  Continue photoprotection and sun avoidance.    2. Verruca  vulgaris, left 2nd digit. Counseled on natural history and viral etiology of this condition. Counseled that these are often recalcitrant to treatment. Advised that highest rates of success can be observed with combination monthly cryotherapy in office and home treatments with salicylic acid . Patient would like cryo today and will continue home salicylic acid. He may return for cryo but will first see how this goes.  CRYOTHERAPY PROCEDURE NOTE: One lesion in the above locations were treated with liquid nitrogen utilizing a 5-10sec thaw time. Patient was advised that the treated areas will become red, swollen, may develop a blister and then should crust and peal off in the next 1-2 weeks. Post-procedure instructions were provided.  Start salicylic acid nightly, handout provided.    3. Lesion to monitor, R mid-frontal scalp. Faint blue macule in this location but dermoscopy without even any clear lesion. Photodocumentation taken today and will continue monitoring. We also took a photo on the patient's phone so that he can monitor and will let us know if any changes. Ddx trauma v early lentigo/SK.    Photodocumentation 12/13/2018.    Patient to monitor if changes.      Follow-up in 1 year, earlier for new or changing lesions or sooner if would like wart retreated or if notes changes to scalp.    Dr. Alley Garza staffed the patient.    Staff Involved:  Resident(Yuni Robbins)/Staff(as above)      Patient was seen and examined with the medicine/dermatology resident. I agree with the history, review of systems, physical examination, assessments and plan. I was present for the cryotherapy procedures.    Alley Garza MD  Professor and  Chair  Department of Dermatology  [facility]        Yuni Robbins MD

## 2018-12-14 NOTE — PROGRESS NOTES
[facility] Dermatology Note      Dermatology Problem List:  1. Benign bx:  - Inflamed SK, R scalp, bx 3/29/18  2. Verruca vulgaris, L index finger, cryo 12/13/2018.  - Yash acid nightly  # Lesion to monitor, R mid-frontal scalp, photo 12/13/2018.    Encounter Date: Dec 13, 2018    CC:   Chief Complaint   Patient presents with     Skin Check     No spots of concern today. No personal or family hx of skin cancer.         History of Present Illness:  Mr. Florencio Doshi is a 68 year old male who returns today for full body skin check.  Last evaluated 3/29/18 at which time an inflamed SK was biopsied on his right scalp.    Since his last visit, he has not noted any concerning lesions.  He has a few brown spots he would like looked at today but they are asymptomatic.  He notes a possible wart on his left hand.  He has been using Compound W at home and is not sure if it is helping.  No additional skin concerns.  Otherwise usual state of health.  No changing, nonhealing, bleeding, or otherwise symptomatic lesions.    Past Medical History:   Patient Active Problem List   Diagnosis     Atherosclerosis of aorta (H)     Atherosclerosis of native artery of left lower extremity (H)     CARDIOVASCULAR SCREENING; LDL GOAL LESS THAN 100     Psoriasis     Advance care planning     Gynecomastia     Impaired fasting glucose     Obesity, Class I, BMI 30-34.9     NAFLD (nonalcoholic fatty liver disease)     Elevated transaminase level     Essential hypertension with goal blood pressure less than 140/90     Past Medical History:   Diagnosis Date     Atherosclerosis of aorta (H) 9/17/2010     BPH      CARDIOVASCULAR SCREENING; LDL GOAL LESS THAN 100 10/31/2010    no hx of hyperlipidemia     Dislocation of elbow, left, open 6/19/12     Diverticulosis of colon     pan-     Elevated blood pressure reading without diagnosis of hypertension 9/1/2015     Elevated PSA      Epididymal cyst     RT     Essential hypertension with  goal blood pressure less than 140/90      Fall from ladder 6/19/12     Hand eczema      Open fracture of left distal radius 6/19/12     Other atherosclerosis of native arteries of the extremities 9/17/2010     Psoriasis     palms     Pyelonephritis 1964     Past Surgical History:   Procedure Laterality Date     C HAND/FINGER SURGERY UNLISTED       COLONOSCOPY WITH CO2 INSUFFLATION N/A 10/19/2017    Procedure: COLONOSCOPY WITH CO2 INSUFFLATION;  COLON SCREEN/ COHEN;  Surgeon: Tristan Tipton MD;  Location: [facility] OR     HC REPAIR FINGER/HAND FLEXOR NOT ZONE 2, EACH  1991    LT index     OPEN REDUCTION INTERNAL FIXATION ELBOW  6/19/12    LT medial tendon/ligament repair, Dr. Jameel Tian     OPEN REDUCTION INTERNAL FIXATION FOREARM  6/19/12    LT distal radius, Dr. Jameel Tian     PROSTATE BIOPSY, NEEDLE, SATURATION SAMPLING  7/31/07    Dr. Waterman       Social History:  Patient is .    Family History:  Family History   Problem Relation Age of Onset     Cancer Sister         uterine or ovarian     Hypertension Brother      Aneurysm Paternal Uncle         AAA     Heart Disease Paternal Uncle      Prostate Cancer Maternal Uncle      Cancer Maternal Aunt         bladder or ureter?     Hypertension Mother      Arthritis Paternal Grandfather         gout     Aneurysm Paternal Grandfather         AAA     Kidney Disease Brother 63        kidney stone, hematuria     Deep Vein Thrombosis Brother 65     Myocardial Infarction Cousin 64     Pancreatic Cancer Maternal Aunt      Alzheimer Disease Paternal Uncle 86     Melanoma No family hx of      Skin Cancer No family hx of        Medications:  Current Outpatient Medications   Medication Sig Dispense Refill     ASPIRIN 81 MG PO TABS 1 TABLET DAILY*       atorvastatin (LIPITOR) 40 MG tablet Take 1 tablet (40 mg) by mouth daily for cholesterol. 90 tablet 1     Cholecalciferol (VITAMIN D) 1000 UNITS capsule Take 1 capsule by mouth daily       diclofenac (VOLTAREN) 50 MG EC  tablet Take 1 tablet (50 mg) by mouth 3 times daily as needed for joint pain. Take with food. 60 tablet 1     fish oil-omega-3 fatty acids 1000 MG capsule Take 1 capsule by mouth Every other day.        lisinopril (PRINIVIL/ZESTRIL) 20 MG tablet Take 1 tablet (20 mg) by mouth daily for blood pressure. 90 tablet 1        Allergies   Allergen Reactions     Clindamycin Rash         Review of Systems:  -Skin: The patient denies any new rash, pruritus, or lesions that are symptomatic, changing or bleeding, except as per HPI.  -Constitutional: Otherwise in usual state of health.    Physical exam:  GEN: This is a well developed, well-nourished male in no acute distress, in a pleasant mood.    SKIN: Full body skin exam excluding the genitals was performed including face, scalp, neck, ears, chest, back, bilateral arms, hands, bilateral legs, feet, and buttocks.   - Gritty pink papule on vertex scalp and left dorsal hand.  - R mid-frontal scalp with faint light blue macule. Dermoscopy no true lesion, no pigment network, and unable to appreciate the blue hue on dermoscopy.  - Left 2nd finger with hyperkeratotic flesh-colored papule with central thrombosed capillaries.  - No other lesions of concern on areas examined.             Impression/Plan:  1. Actinic keratoses x2.  CRYOTHERAPY PROCEDURE NOTE: 2 lesions in the above locations were treated with liquid nitrogen utilizing a 5-10sec thaw time. Patient was advised that the treated areas will become red, swollen, may develop a blister and then should crust and peal off in the next 1-2 weeks. Post-procedure instructions were provided.  Continue photoprotection and sun avoidance.    2. Verruca vulgaris, left 2nd digit. Counseled on natural history and viral etiology of this condition. Counseled that these are often recalcitrant to treatment. Advised that highest rates of success can be observed with combination monthly cryotherapy in office and home treatments with salicylic  acid . Patient would like cryo today and will continue home salicylic acid. He may return for cryo but will first see how this goes.  CRYOTHERAPY PROCEDURE NOTE: One lesion in the above locations were treated with liquid nitrogen utilizing a 5-10sec thaw time. Patient was advised that the treated areas will become red, swollen, may develop a blister and then should crust and peal off in the next 1-2 weeks. Post-procedure instructions were provided.  Start salicylic acid nightly, handout provided.    3. Lesion to monitor, R mid-frontal scalp. Faint blue macule in this location but dermoscopy without even any clear lesion. Photodocumentation taken today and will continue monitoring. We also took a photo on the patient's phone so that he can monitor and will let us know if any changes. Ddx trauma v early lentigo/SK.    Photodocumentation 12/13/2018.    Patient to monitor if changes.      Follow-up in 1 year, earlier for new or changing lesions or sooner if would like wart retreated or if notes changes to scalp.    Dr. Alley Garza staffed the patient.    Staff Involved:  Resident(Yuni Robbins)/Staff(as above)      Patient was seen and examined with the medicine/dermatology resident. I agree with the history, review of systems, physical examination, assessments and plan. I was present for the cryotherapy procedures.    Alley Garza MD  Professor and  Chair  Department of Dermatology  [facility]

## 2019-03-05 ENCOUNTER — OFFICE VISIT (OUTPATIENT)
Dept: FAMILY MEDICINE | Facility: CLINIC | Age: 69
End: 2019-03-05
Payer: MEDICARE

## 2019-03-05 VITALS
HEART RATE: 52 BPM | DIASTOLIC BLOOD PRESSURE: 72 MMHG | HEIGHT: 68 IN | BODY MASS INDEX: 30.92 KG/M2 | OXYGEN SATURATION: 94 % | TEMPERATURE: 97.9 F | WEIGHT: 204 LBS | SYSTOLIC BLOOD PRESSURE: 136 MMHG | RESPIRATION RATE: 18 BRPM

## 2019-03-05 DIAGNOSIS — Z23 NEED FOR SHINGLES VACCINE: ICD-10-CM

## 2019-03-05 DIAGNOSIS — R73.01 IMPAIRED FASTING GLUCOSE: ICD-10-CM

## 2019-03-05 DIAGNOSIS — I70.202 ATHEROSCLEROSIS OF NATIVE ARTERY OF LEFT LOWER EXTREMITY, WITH UNSPECIFIED PRESENCE OF CLINICAL MANIFESTATION (H): Primary | ICD-10-CM

## 2019-03-05 DIAGNOSIS — I10 ESSENTIAL HYPERTENSION WITH GOAL BLOOD PRESSURE LESS THAN 140/90: ICD-10-CM

## 2019-03-05 DIAGNOSIS — I70.0 ATHEROSCLEROSIS OF AORTA (H): ICD-10-CM

## 2019-03-05 LAB
ALT SERPL W P-5'-P-CCNC: 83 U/L (ref 0–70)
CHOLEST SERPL-MCNC: 99 MG/DL
CREAT SERPL-MCNC: 1.01 MG/DL (ref 0.66–1.25)
GFR SERPL CREATININE-BSD FRML MDRD: 76 ML/MIN/{1.73_M2}
GLUCOSE SERPL-MCNC: 99 MG/DL (ref 70–99)
HBA1C MFR BLD: 6.1 % (ref 0–5.6)
HDLC SERPL-MCNC: 46 MG/DL
LDLC SERPL CALC-MCNC: 38 MG/DL
NONHDLC SERPL-MCNC: 53 MG/DL
POTASSIUM SERPL-SCNC: 4.8 MMOL/L (ref 3.4–5.3)
TRIGL SERPL-MCNC: 77 MG/DL

## 2019-03-05 PROCEDURE — 82565 ASSAY OF CREATININE: CPT | Performed by: FAMILY MEDICINE

## 2019-03-05 PROCEDURE — 99214 OFFICE O/P EST MOD 30 MIN: CPT | Performed by: FAMILY MEDICINE

## 2019-03-05 PROCEDURE — 36415 COLL VENOUS BLD VENIPUNCTURE: CPT | Performed by: FAMILY MEDICINE

## 2019-03-05 PROCEDURE — 80061 LIPID PANEL: CPT | Performed by: FAMILY MEDICINE

## 2019-03-05 PROCEDURE — 84132 ASSAY OF SERUM POTASSIUM: CPT | Performed by: FAMILY MEDICINE

## 2019-03-05 PROCEDURE — 82947 ASSAY GLUCOSE BLOOD QUANT: CPT | Performed by: FAMILY MEDICINE

## 2019-03-05 PROCEDURE — 84460 ALANINE AMINO (ALT) (SGPT): CPT | Performed by: FAMILY MEDICINE

## 2019-03-05 PROCEDURE — 83036 HEMOGLOBIN GLYCOSYLATED A1C: CPT | Performed by: FAMILY MEDICINE

## 2019-03-05 RX ORDER — ATORVASTATIN CALCIUM 40 MG/1
40 TABLET, FILM COATED ORAL DAILY
Qty: 90 TABLET | Refills: 1 | Status: SHIPPED | OUTPATIENT
Start: 2019-03-05 | End: 2019-10-01

## 2019-03-05 RX ORDER — LISINOPRIL 20 MG/1
20 TABLET ORAL DAILY
Qty: 90 TABLET | Refills: 1 | Status: SHIPPED | OUTPATIENT
Start: 2019-03-05 | End: 2019-08-13

## 2019-03-05 ASSESSMENT — MIFFLIN-ST. JEOR: SCORE: 1666.9

## 2019-03-05 ASSESSMENT — PAIN SCALES - GENERAL: PAINLEVEL: NO PAIN (0)

## 2019-03-05 NOTE — LETTER
2019      Florencio Doshi  [address]  [place] [address]              Dear Florencio Doshi      Your HgbA1c is higher, indicating pre-diabetes. All of the rest of your test results were within the expected range for you.     Enclosed is a copy of the results.  It was a pleasure to see you at your last appointment.    If you have any questions or concerns, please call myself or my nurse at [phone].      Sincerely,      Ant Wright MD/NOVA Swanson MA  [employer]      Results for orders placed or performed in visit on 19   Lipid panel reflex to direct LDL Non-fasting   Result Value Ref Range    Cholesterol 99 <200 mg/dL    Triglycerides 77 <150 mg/dL    HDL Cholesterol 46 >39 mg/dL    LDL Cholesterol Calculated 38 <100 mg/dL    Non HDL Cholesterol 53 <130 mg/dL   ALT   Result Value Ref Range    ALT 83 (H) 0 - 70 U/L   Potassium   Result Value Ref Range    Potassium 4.8 3.4 - 5.3 mmol/L   Creatinine   Result Value Ref Range    Creatinine 1.01 0.66 - 1.25 mg/dL    GFR Estimate 76 >60 mL/min/[1.73_m2]    GFR Estimate If Black 88 >60 mL/min/[1.73_m2]   Glucose   Result Value Ref Range    Glucose 99 70 - 99 mg/dL   Hemoglobin A1c   Result Value Ref Range    Hemoglobin A1C 6.1 (H) 0 - 5.6 %                 RE: Florencio Doshi   MRN: [id number]  : 1950  ENC DATE: Mar 5, 2019

## 2019-03-05 NOTE — PROGRESS NOTES
"  SUBJECTIVE:   Florencio Doshi is a 68 year old male who presents to clinic today for the following health issues:      Hyperlipidemia Follow-Up      Rate your low fat/cholesterol diet?: fair    Taking statin?  Yes, no muscle aches from statin    Other lipid medications/supplements?:  Fish oil/Omega 3, dose without side effects    Hypertension Follow-up      Outpatient blood pressures are not being checked.    Low Salt Diet: low salt      Amount of exercise or physical activity: 6-7 days/week for an average of 60 minutes    Problems taking medications regularly: No    Medication side effects: none    Diet: low salt and low fat/cholesterol        Problem list and histories reviewed & adjusted, as indicated.  Additional history: as documented    BP Readings from Last 3 Encounters:   03/05/19 136/72   09/18/18 138/74   08/09/18 132/76    Wt Readings from Last 3 Encounters:   03/05/19 92.5 kg (204 lb)   09/18/18 93.2 kg (205 lb 6.4 oz)   08/09/18 92.5 kg (204 lb)                 Reviewed and updated as needed this visit by clinical staff  Tobacco  Allergies  Meds  Problems  Med Hx  Surg Hx  Fam Hx  Soc Hx        Reviewed and updated as needed this visit by Provider         ROS:  Constitutional, HEENT, cardiovascular, pulmonary, gi and gu systems are negative, except as otherwise noted.    This document serves as a record of the services and decisions personally performed by GIANCARLO COHEN. It was created on his/her behalf by Leyla Mcguire, a trained medical scribe. The creation of this document is based on the provider's statements to the medical scribe. Leyla Mcguire, March 5, 2019 9:35 AM    OBJECTIVE:     /72 (BP Location: Right arm, Patient Position: Chair, Cuff Size: Adult Regular)   Pulse 52   Temp 97.9  F (36.6  C) (Oral)   Resp 18   Ht 1.715 m (5' 7.5\")   Wt 92.5 kg (204 lb)   SpO2 94%   BMI 31.48 kg/m    Body mass index is 31.48 kg/m .  GENERAL: healthy, alert and no distress  EYES: Eyes " grossly normal to inspection, PERRL, EOMI, sclerae white and conjunctivae normal  RESP: lungs clear to auscultation - no crackles or wheezes, no areas of dullness, no tachypnea  CV: Heart regular rate and rhythm without murmur, click or rub. No peripheral edema and peripheral pulses strong  MS: no gross musculoskeletal defects noted, no edema  SKIN: no suspicious lesions or rashes  NEURO: Normal strength and tone, sensory exam grossly normal, mentation intact, oriented times 3 and cranial nerves 2-12 intact  PSYCH: mentation appears normal, affect normal/bright     ASSESSMENT/PLAN:     (I70.202) Atherosclerosis of native artery of left lower extremity, with unspecified presence of clinical manifestation (H)  (primary encounter diagnosis)  (I70.0) Atherosclerosis of aorta (H)  Comment: Stable vascular disease on a high-intensity statin.  Plan: Lipid panel reflex to direct LDL Non-fasting,         ALT, atorvastatin (LIPITOR) 40 MG tablet        Return in about 6 months (around 9/18/2019) for full physical, lab tests, recheck medications.     (I10) Essential hypertension with goal blood pressure less than 140/90  Comment: Controlled at his usual level.  Plan: Potassium, Creatinine, lisinopril         (PRINIVIL/ZESTRIL) 20 MG tablet        Return in about 6 months (around 9/18/2019) for full physical, lab tests, recheck medications.     (R73.01) Impaired fasting glucose  Comment: Mild  Plan: Glucose, Hemoglobin A1c            (Z23) Need for shingles vaccine  Comment: Discussed at previous visit.   Plan: He plans to get the vaccine, but just hasn't got around to it.     The information in this document, created by the medical scribe Leyla Mcguire for me, accurately reflects the services I personally performed and the decisions made by me. I have reviewed and approved this document for accuracy prior to leaving the patient care area.    Ant Wright MD  [facility]

## 2019-03-05 NOTE — PATIENT INSTRUCTIONS
================================================================================  Normal Values   Blood pressure  <140/90 for most adults    <130/80 for some chronic diseases (ask your care team about yours)    BMI (body mass index)  18.5-25 kg/m2 (based on height and weight)     Thank you for visiting [facility]    Normal or non-critical lab and imaging results will be communicated to you by MyChart, letter or phone within 7 days.  If you do not hear from us within 10 days, please call the clinic. If you have a critical or abnormal lab result, we will notify you by phone as soon as possible.     If you have any questions regarding your visit please contact:     [facility]:   Clinic Hours Telephone Number   Dr. Ant Stark Dr. Vocal 7am-5pm  Monday - Friday [phone]  Vanna RN  Radha RN  Tomasa RN   Pharmacy 8:00am-8pm Monday-Friday    9am-5pm Saturday-Sunday [phone]   Urgent Care 11am-9pm Monday-Friday        9am-5pm Saturday-Sunday [phone]     After hours, weekend or if you need to make an appointment with your primary provider please call [phone].   After Hours nurse advise: call [facility]: [phone]    Medication Refills:  Call your pharmacy and they will forward the refill to us. Please allow 3 business days for your refills to be completed.

## 2019-03-18 ENCOUNTER — TELEPHONE (OUTPATIENT)
Dept: FAMILY MEDICINE | Facility: CLINIC | Age: 69
End: 2019-03-18

## 2019-03-18 NOTE — TELEPHONE ENCOUNTER
Reason for Call:  Other Lab Results from 3/5/19    Detailed comments: Patient would like his lab result from 3/5/19 be mailed to him at his current address    Phone Number Patient can be reached at: Home number on file [phone] (home)    Best Time: any    Can we leave a detailed message on this number? YES    Call taken on 3/18/2019 at 4:00 PM by Angélica Leggett

## 2019-03-29 ENCOUNTER — OFFICE VISIT (OUTPATIENT)
Dept: FAMILY MEDICINE | Facility: CLINIC | Age: 69
End: 2019-03-29
Payer: MEDICARE

## 2019-03-29 VITALS
HEART RATE: 70 BPM | WEIGHT: 204 LBS | HEIGHT: 68 IN | DIASTOLIC BLOOD PRESSURE: 77 MMHG | TEMPERATURE: 98.4 F | BODY MASS INDEX: 30.92 KG/M2 | SYSTOLIC BLOOD PRESSURE: 132 MMHG | OXYGEN SATURATION: 96 %

## 2019-03-29 DIAGNOSIS — R73.01 IMPAIRED FASTING GLUCOSE: Primary | ICD-10-CM

## 2019-03-29 PROCEDURE — 99213 OFFICE O/P EST LOW 20 MIN: CPT | Performed by: FAMILY MEDICINE

## 2019-03-29 ASSESSMENT — MIFFLIN-ST. JEOR: SCORE: 1661.9

## 2019-03-29 ASSESSMENT — PAIN SCALES - GENERAL: PAINLEVEL: NO PAIN (0)

## 2019-03-29 NOTE — PROGRESS NOTES
"  SUBJECTIVE:   Florencio Doshi is a 69 year old male who presents to clinic today for the following health issues:  Over 50% of this 20-minute visit is spent face-to-face with the patient, counseling him on interpretation of lab results and on my recommended treatment of his various problems and coordination of his care, as summarized below:     Consult blood work elevated A1C  Has been going to exercise classes. Does drink 2 Cokes weekly.    Past medical, family, and social histories, medications, and allergies are reviewed and updated in Epic.     ROS:  Constitutional, HEENT, cardiovascular, pulmonary, GI, , musculoskeletal, neuro, skin, endocrine and psych systems are negative, except as otherwise noted.    This document serves as a record of the services and decisions personally performed and made by Ant Wright MD. It was created on his behalf by Wendy Wright, a trained medical scribe. The creation of this document is based the provider's statements to the medical scribe.    Scribe Wendy Wright 4:06 PM 3/29/2019      OBJECTIVE:     /77   Pulse 70   Temp 98.4  F (36.9  C) (Oral)   Ht 1.715 m (5' 7.5\")   Wt 92.5 kg (204 lb)   SpO2 96%   BMI 31.48 kg/m    Body mass index is 31.48 kg/m .  GENERAL: healthy, alert and no distress  EYES: Eyes grossly normal to inspection, PERRL, EOMI, sclerae white and conjunctivae normal  MS: no gross musculoskeletal defects noted, no edema  SKIN: no suspicious lesions or rashes to visible skin  NEURO: Normal strength and tone, sensory exam grossly normal, mentation intact, oriented times 3 and cranial nerves 2-12 intact  PSYCH: mentation appears normal, affect normal/bright     Diagnostic Test Results:  Results for orders placed or performed in visit on 03/05/19   Lipid panel reflex to direct LDL Non-fasting   Result Value Ref Range    Cholesterol 99 <200 mg/dL    Triglycerides 77 <150 mg/dL    HDL Cholesterol 46 >39 mg/dL    LDL Cholesterol Calculated 38 <100 mg/dL "    Non HDL Cholesterol 53 <130 mg/dL   ALT   Result Value Ref Range    ALT 83 (H) 0 - 70 U/L   Potassium   Result Value Ref Range    Potassium 4.8 3.4 - 5.3 mmol/L   Creatinine   Result Value Ref Range    Creatinine 1.01 0.66 - 1.25 mg/dL    GFR Estimate 76 >60 mL/min/[1.73_m2]    GFR Estimate If Black 88 >60 mL/min/[1.73_m2]   Glucose   Result Value Ref Range    Glucose 99 70 - 99 mg/dL   Hemoglobin A1c   Result Value Ref Range    Hemoglobin A1C 6.1 (H) 0 - 5.6 %     Lab Results   Component Value Date    A1C 6.1 03/05/2019    A1C 5.9 02/20/2017    A1C 5.9 02/21/2014    A1C 5.6 08/19/2013    A1C 5.6 02/20/2013        ASSESSMENT/PLAN:   (R73.01) Impaired fasting glucose  (primary encounter diagnosis)  Comment: New diagnosis for him.   Plan: I discussed the significance of Hgb A1C and the importance of increasing his aerobic activity and decreasing his weight to decrease his risk of developing diabetes. Handouts provided.   Return in about 6 months (around 9/18/2019), or for full physical, lab tests, recheck medications.       The information in this document, created by a scribe for me, accurately reflects the services I personally performed and the decisions made by me. I have reviewed and approved this document for accuracy.      Ant Wright MD  [facility]

## 2019-03-29 NOTE — PATIENT INSTRUCTIONS
================================================================================  Normal Values   Blood pressure  <140/90 for most adults    <130/80 for some chronic diseases (ask your care team about yours)    BMI (body mass index)  18.5-25 kg/m2 (based on height and weight)     Thank you for visiting [facility]    Normal or non-critical lab and imaging results will be communicated to you by MyChart, letter or phone within 7 days.  If you do not hear from us within 10 days, please call the clinic. If you have a critical or abnormal lab result, we will notify you by phone as soon as possible.     If you have any questions regarding your visit please contact:     Team Comfort:   Clinic Hours Telephone Number   Dr. Ant Stark Dr. [place] 7am-5pm  Monday - Friday [phone]  Vanna RN  Radha Randolph RN   Pharmacy 8:00am-8pm Monday-Friday    9am-5pm Saturday-Sunday [phone]   Urgent Care 11am-9pm Monday-Friday        9am-5pm Saturday-Sunday [phone]     After hours, weekend or if you need to make an appointment with your primary provider please call [phone].   After Hours nurse advise: call [facility]: [phone]    Medication Refills:  Call your pharmacy and they will forward the refill to us. Please allow 3 business days for your refills to be completed.          Patient Education     Prediabetes  You have been diagnosed with prediabetes. This means that the level of sugar (glucose) in your blood is too high. If you have prediabetes, you are at risk for developing type 2 diabetes. Type 2 diabetes is diagnosed when the level of glucose in the blood reaches a certain high level. With prediabetes, it hasn t reached this point yet, but it is higher than normal. It is vital to make lifestyle changes to lower your blood sugar, improve your health, and prevent diabetes. This sheet will tell you more.      Why worry about  prediabetes?  Prediabetes is a disease where the body s cells have trouble using glucose in the blood for energy. As a result, too much glucose stays in the blood and can affect how your heart and blood vessels work. Without changes in diet and lifestyle, the problem can get worse. Once you have type 2 diabetes, it is chronic (ongoing) and needs to be managed for the rest of your life. Diabetes can harm the body and your health by damaging organs, such as your eyes and kidneys. It makes you more likely to have heart disease. And it can damage nerves and blood vessels.  Who is a risk for prediabetes?  The exact cause of prediabetes is not clear. But certain risk factors make a person more likely to have it. These include:    A family history of type 2 diabetes    Being overweight    Being over age 45    Have hypertension or elevated cholesterol     Having had gestational diabetes    Not being physically active    Being ,  American, , , , or   Diagnosing prediabetes  Prediabetes may have no symptoms or you may have some of the symptoms of diabetes. The diagnosis is made with a blood test. You may have one or more of these blood tests:     Fasting glucose test. Blood is taken and tested after you have fasted (not eaten) for at least 8 hours. A normal test result is 99 milligrams per deciliter (mg/dL) or lower. Prediabetes is 100 mg/dL to 125 mg/dL. Diabetes is 126 mg/dL or higher.    Glucose tolerance test. Your blood sugar is measured before and after you drink a very sugary liquid. A normal test result is 139 milligrams per deciliter (mg/dL) or lower. Prediabetes is 140 mg/dL to 199 mg/dL. Diabetes is 200 mg/dL or higher.    Hemoglobin A1c (HbA1c). Your HbA1c is normal if it is below 5.7%. Prediabetes is 5.7% to 6.4%. Diabetes is 6.5% or higher.   Treating prediabetes  The best way to treat prediabetes is to lose at least 5% to 7% of your  current weight and be more physically active by getting at least 150 minutes a week of physical activity. When sitting for long periods of time, get up for short sessions of light activity every 30 minutes. These changes help the body s cells use blood sugar better. Even a small amount of weight loss can help. Work with your healthcare provider to make a plan to eat well and be more active. Keep in mind that small changes can add up. Other changes in your lifestyle (or even taking certain medicines, such as metformin) may make you less likely to develop diabetes. Your healthcare provider can talk with you about these.  Follow-up  If it is untreated, prediabetes can turn into diabetes. This is a serious health condition. Take steps to stop this from happening. Follow the treatment plan you have been given. You may have your blood glucose tested again in about 12 to 18 months.  Symptoms of diabetes  Let your healthcare provider know if you have any of the following:    Always feel very tired    Feel very thirsty or hungry much of the time    Have to urinate often    Lose weight for no reason    Feel numbness or tingling in your fingers or toes    Have cuts or bruises that don t seem to heal    Have blurry vision   Date Last Reviewed: 5/1/2016 2000-2018 [employer]. [address] [place] [address], [place] [address]. All rights reserved. This information is not intended as a substitute for professional medical care. Always follow your healthcare professional's instructions.           Patient Education     Understanding Type 2 Diabetes  When your body is working normally, the food you eat is digested and used as fuel. This fuel supplies energy to the body s cells. When you have diabetes, the fuel can t enter the cells. Without treatment, diabetes can cause serious long-term health problems.     Your body breaks down the food you eat into glucose.   How the body gets energy  The digestive system breaks down food,  resulting in a sugar called glucose. Some of this glucose is stored in the liver. But most of it enters the bloodstream and travels to the cells to be used as fuel. Glucose needs the help of a hormone called insulin to enter the cells. Insulin is made in the pancreas. It is released into the bloodstream in response to the presence of glucose in the blood. Think of insulin as a key. When insulin reaches a cell, it attaches to the cell wall. This signals the cell to create an opening that allows glucose to enter the cell.      When you have type 2 diabetes  Early in type 2 diabetes, your cells don t respond properly to insulin. Because of this, less glucose than normal moves into cells. This is called insulin resistance. In response, the pancreas makes more insulin. But eventually, the pancreas can t produce enough insulin to overcome insulin resistance. As less and less glucose enters cells, it builds up to a harmful level in the bloodstream. This is known as high blood sugar or hyperglycemia. The result is type 2 diabetes. The cells become starved for energy, which can leave you feeling tired and rundown.  Why high blood sugar is a problem  If high blood sugar is not controlled, blood vessels throughout the body become damaged. Prolonged high blood sugar affects organs, blood vessels, and nerves. As a result, the risks of damage to the heart, kidneys, eyes, and limbs increase. Diabetes also makes other problems, such as high blood pressure and high cholesterol, more dangerous. Over time, people with uncontrolled high blood sugar have an increase in risk of dying of, or being disabled by, heart attack, heart failure,  or stroke.  Date Last Reviewed: 7/1/2016 2000-2018 [employer]. [address] [place] [address], [place] [address]. All rights reserved. This information is not intended as a substitute for professional medical care. Always follow your healthcare professional's instructions.    Aerobic exercise  for at least 150 minutes per week (40+ minutes per day, 4-6 days per week). This will help you control your weight and prevent disease.

## 2019-06-03 ENCOUNTER — OFFICE VISIT (OUTPATIENT)
Dept: FAMILY MEDICINE | Facility: CLINIC | Age: 69
End: 2019-06-03
Payer: MEDICARE

## 2019-06-03 VITALS
OXYGEN SATURATION: 95 % | RESPIRATION RATE: 18 BRPM | SYSTOLIC BLOOD PRESSURE: 138 MMHG | WEIGHT: 201.6 LBS | DIASTOLIC BLOOD PRESSURE: 85 MMHG | HEART RATE: 60 BPM | BODY MASS INDEX: 30.55 KG/M2 | TEMPERATURE: 97.5 F | HEIGHT: 68 IN

## 2019-06-03 DIAGNOSIS — S61.441A PUNCTURE WOUND OF RIGHT HAND WITH FOREIGN BODY, INITIAL ENCOUNTER: Primary | ICD-10-CM

## 2019-06-03 PROCEDURE — 99213 OFFICE O/P EST LOW 20 MIN: CPT | Mod: 25 | Performed by: PHYSICIAN ASSISTANT

## 2019-06-03 PROCEDURE — 90715 TDAP VACCINE 7 YRS/> IM: CPT | Performed by: PHYSICIAN ASSISTANT

## 2019-06-03 PROCEDURE — 90471 IMMUNIZATION ADMIN: CPT | Performed by: PHYSICIAN ASSISTANT

## 2019-06-03 RX ORDER — CEPHALEXIN 500 MG/1
500 CAPSULE ORAL 3 TIMES DAILY
Qty: 21 CAPSULE | Refills: 0 | Status: SHIPPED | OUTPATIENT
Start: 2019-06-03 | End: 2019-08-01

## 2019-06-03 ASSESSMENT — MIFFLIN-ST. JEOR: SCORE: 1651.01

## 2019-06-03 ASSESSMENT — PAIN SCALES - GENERAL: PAINLEVEL: EXTREME PAIN (8)

## 2019-06-03 NOTE — PROGRESS NOTES
Subjective     Florencio Doshi is a 69 year old male who presents to clinic today for the following health issues:    HPI   Concern - swollen Right hand  Onset: x 18 hours ago    Description:   Chicken wire had poked the top of the right hand kept working but a couple hours after the hand has been swelling and more painful. Hurts to bend a few of the fingers and can't make a fist. Feels like a burning pain.  Not sure how deep it went.      Intensity: 8/10 when squeezing the hand    Progression of Symptoms:  worsening    Accompanying Signs & Symptoms:  None    Previous history of similar problem:   None    Precipitating factors:   Worsened by: Trying to make a fist     Alleviating factors:  Improved by: None    Therapies Tried and outcome: Ice, no relief.     TDAP 2012          Allergies   Allergen Reactions     Clindamycin Rash       Past Medical History:   Diagnosis Date     Atherosclerosis of aorta (H) 9/17/2010     BPH      CARDIOVASCULAR SCREENING; LDL GOAL LESS THAN 100 10/31/2010    no hx of hyperlipidemia     Dislocation of elbow, left, open 6/19/12     Diverticulosis of colon     pan-     Elevated blood pressure reading without diagnosis of hypertension 9/1/2015     Elevated PSA      Epididymal cyst     RT     Essential hypertension with goal blood pressure less than 140/90      Fall from ladder 6/19/12     Hand eczema      Open fracture of left distal radius 6/19/12     Other atherosclerosis of native arteries of the extremities 9/17/2010     Psoriasis     palms     Pyelonephritis 1964         Current Outpatient Medications on File Prior to Visit:  ASPIRIN 81 MG PO TABS 1 TABLET DAILY*   atorvastatin (LIPITOR) 40 MG tablet Take 1 tablet (40 mg) by mouth daily for cholesterol.   Cholecalciferol (VITAMIN D) 1000 UNITS capsule Take 1 capsule by mouth daily   diclofenac (VOLTAREN) 50 MG EC tablet Take 1 tablet (50 mg) by mouth 3 times daily as needed for joint pain. Take with food.   fish oil-omega-3 fatty  "acids 1000 MG capsule Take 1 capsule by mouth Every other day.    lisinopril (PRINIVIL/ZESTRIL) 20 MG tablet Take 1 tablet (20 mg) by mouth daily for blood pressure.     No current facility-administered medications on file prior to visit.     Social History     Tobacco Use     Smoking status: Never Smoker     Smokeless tobacco: Never Used   Substance Use Topics     Alcohol use: Yes     Comment: occassionally     Drug use: No       ROS:  General: negative for fever  SKIN: + as above  MUSC- finger pain as above    Physcial Exam:  /85 (BP Location: Left arm, Patient Position: Sitting, Cuff Size: Adult Regular)   Pulse 60   Temp 97.5  F (36.4  C) (Oral)   Resp 18   Ht 1.715 m (5' 7.5\")   Wt 91.4 kg (201 lb 9.6 oz)   SpO2 95%   BMI 31.11 kg/m      GENERAL: alert, no acute distress  EYES: conjunctival clear  RESP: Regular breathing rate  NEURO: awake .  SKIN: rt thirds MCPJ is mod red but not really warm, ROM mod decreased at his joint    ASSESSMENT: onset of symptoms was very fast ad more c/w tendon injury than infection, regardless given puncture CHRISS ABXs indicated.    ICD-10-CM    1. Puncture wound of right hand with foreign body, initial encounter S61.441A TDAP VACCINE (ADACEL)     cephALEXin (KEFLEX) 500 MG capsule     ORTHO  REFERRAL       PLAN:  See today's orders.  Follow-up with primary clinic if not improving.  Advised about symptoms which might herald more serious problems.      "

## 2019-06-03 NOTE — NURSING NOTE
Screening Questionnaire for Adult Immunization    Are you sick today?   No   Do you have allergies to medications, food, a vaccine component or latex?   No   Have you ever had a serious reaction after receiving a vaccination?   No   Do you have a long-term health problem with heart disease, lung disease, asthma, kidney disease, metabolic disease (e.g. diabetes), anemia, or other blood disorder?   No   Do you have cancer, leukemia, HIV/AIDS, or any other immune system problem?   No   In the past 3 months, have you taken medications that affect  your immune system, such as prednisone, other steroids, or anticancer drugs; drugs for the treatment of rheumatoid arthritis, Crohn s disease, or psoriasis; or have you had radiation treatments?   No   Have you had a seizure, or a brain or other nervous system problem?   No   During the past year, have you received a transfusion of blood or blood     products, or been given immune (gamma) globulin or antiviral drug?   No   For women: Are you pregnant or is there a chance you could become        pregnant during the next month?   No   Have you received any vaccinations in the past 4 weeks?   No     Immunization questionnaire answers were all negative.        Per orders of Dr. Le, injection of TDAP given by Delmis Roberts. Patient instructed to remain in clinic for 15 minutes afterwards, and to report any adverse reaction to me immediately.       Screening performed by Delmis Roberts on 6/3/2019 at 7:32 AM.

## 2019-06-05 ENCOUNTER — OFFICE VISIT (OUTPATIENT)
Dept: ORTHOPEDICS | Facility: CLINIC | Age: 69
End: 2019-06-05
Payer: MEDICARE

## 2019-06-05 ENCOUNTER — ANCILLARY PROCEDURE (OUTPATIENT)
Dept: GENERAL RADIOLOGY | Facility: CLINIC | Age: 69
End: 2019-06-05
Attending: PHYSICIAN ASSISTANT
Payer: MEDICARE

## 2019-06-05 VITALS
BODY MASS INDEX: 30.92 KG/M2 | WEIGHT: 204 LBS | RESPIRATION RATE: 16 BRPM | HEIGHT: 68 IN | DIASTOLIC BLOOD PRESSURE: 75 MMHG | SYSTOLIC BLOOD PRESSURE: 122 MMHG

## 2019-06-05 DIAGNOSIS — M79.641 RIGHT HAND PAIN: ICD-10-CM

## 2019-06-05 DIAGNOSIS — S60.551A INFECTED FOREIGN BODY IN RIGHT HAND, INITIAL ENCOUNTER: ICD-10-CM

## 2019-06-05 DIAGNOSIS — S61.441A PUNCTURE WOUND OF RIGHT HAND WITH FOREIGN BODY, INITIAL ENCOUNTER: Primary | ICD-10-CM

## 2019-06-05 DIAGNOSIS — L08.9 INFECTED FOREIGN BODY IN RIGHT HAND, INITIAL ENCOUNTER: ICD-10-CM

## 2019-06-05 PROCEDURE — 99203 OFFICE O/P NEW LOW 30 MIN: CPT | Mod: 25 | Performed by: ORTHOPAEDIC SURGERY

## 2019-06-05 PROCEDURE — 10120 INC&RMVL FB SUBQ TISS SMPL: CPT | Mod: RT | Performed by: ORTHOPAEDIC SURGERY

## 2019-06-05 PROCEDURE — 73130 X-RAY EXAM OF HAND: CPT | Mod: RT

## 2019-06-05 RX ORDER — CEPHALEXIN 500 MG/1
500 CAPSULE ORAL 4 TIMES DAILY
Qty: 28 CAPSULE | Refills: 0 | Status: SHIPPED | OUTPATIENT
Start: 2019-06-05 | End: 2019-08-01

## 2019-06-05 ASSESSMENT — PAIN SCALES - GENERAL: PAINLEVEL: MODERATE PAIN (4)

## 2019-06-05 ASSESSMENT — MIFFLIN-ST. JEOR: SCORE: 1665.87

## 2019-06-05 NOTE — LETTER
6/5/2019         RE: Florencio Doshi  [address]  [facility] [address]        Dear Colleague,    Thank you for referring your patient, Florencio Doshi, to the [facility]. Please see a copy of my visit note below.    Chief Complaint:   Chief Complaint   Patient presents with     Left Hand - Pain     Right hand pain. Onset: 6/2/19 brushed his hand against some chicken wire and suspects a foreign body. Right hand dominant. Had tetanus and put on abx.         Florencio Doshi is seen today in the [facility] for evaluation of right hand injury at the request of Martir Le PA-C        HPI: Florencio Doshi is a 69 year old male , right -hand dominant, who presents for evaluation and management of a right hand injury. He injured his hand on 6/2/2019 while working in his garden, caught the top of his hand on some chicken wire.  Was seen in urgent care the next day with pain, swelling. Started on antibiotics. It has been 3 days since the initial injury. He thinks something is in there. Overall pain, swelling improved since onset. Had tetanus at urgent care. Leaving for [place] on Friday for fishing trip.      He reports having mild pain/discomfort around the injury site. He denies associated numbness or tingling. He denies any other injuries to his upper extremity.   Symptoms: pain, swelling.  Location of symptoms: top of right hand.  Pain severity: 4/10  Pain quality: dull, aching, sharp and throbbing  Frequency of symptoms: frequently    Previous treatment: antibiotics.    Past medical history:  has a past medical history of Atherosclerosis of aorta (H) (9/17/2010), BPH, CARDIOVASCULAR SCREENING; LDL GOAL LESS THAN 100 (10/31/2010), Dislocation of elbow, left, open (6/19/12), Diverticulosis of colon, Elevated blood pressure reading without diagnosis of hypertension (9/1/2015), Elevated PSA, Epididymal cyst, Essential hypertension with goal blood  pressure less than 140/90, Fall from ladder (6/19/12), Hand eczema, Open fracture of left distal radius (6/19/12), Other atherosclerosis of native arteries of the extremities (9/17/2010), Psoriasis, and Pyelonephritis (1964). He also has no past medical history of Acne vulgaris, Actinic keratosis, Allergies, Basal cell carcinoma, Bleeding disorder (H), Cancer (H), Degenerative joint disease, Eczema, Heart valve disorder, Inflammatory arthritis, Kidney disease, Malignant melanoma nos, Pacemaker, Photosensitive contact dermatitis, Skin cancer, Squamous cell carcinoma, Surgical complications, Type II or unspecified type diabetes mellitus without mention of complication, not stated as uncontrolled, Unspecified asthma(493.90), or Urticaria.   Patient Active Problem List    Diagnosis Date Noted     Essential hypertension with goal blood pressure less than 140/90 09/01/2015     Priority: Medium     NAFLD (nonalcoholic fatty liver disease) 10/30/2014     Priority: Medium     Elevated transaminase level 10/30/2014     Priority: Medium     Obesity, Class I, BMI 30-34.9 08/27/2013     Priority: Medium     Impaired fasting glucose 02/07/2012     Priority: Medium     Gynecomastia 05/24/2011     Priority: Medium     Advance care planning 05/16/2011     Priority: Medium     Advance Care Planning 9/8/2016: Discussed advance care planning with patient; information given to patient to review. September 6, 2016  Advance Care Planning 5/16/2011: Discussed advance care planning with patient; however, patient declined at this time. 5/16/2011 Lesly Grajeda MA                 Psoriasis 05/02/2011     Priority: Medium     CARDIOVASCULAR SCREENING; LDL GOAL LESS THAN 100 10/31/2010     Priority: Medium     Atherosclerosis of aorta (H) 09/17/2010     Priority: Medium     U/S 9/14/10 and 9/28/16: no aneurysm.       Atherosclerosis of native artery of left lower extremity (H) 09/17/2010     Priority: Medium         Past surgical history:  has a  past surgical history that includes REPAIR FINGER/HAND FLEXOR NOT ZONE 2, EACH (1991); prostate biopsy, needle, saturation sampling (7/31/07); HAND/FINGER SURGERY UNLISTED; Open reduction internal fixation forearm (6/19/12); Open reduction internal fixation elbow (6/19/12); and Colonoscopy with CO2 insufflation (N/A, 10/19/2017).     Medications:    Current Outpatient Medications   Medication Sig Dispense Refill     ASPIRIN 81 MG PO TABS 1 TABLET DAILY*       atorvastatin (LIPITOR) 40 MG tablet Take 1 tablet (40 mg) by mouth daily for cholesterol. 90 tablet 1     cephALEXin (KEFLEX) 500 MG capsule Take 1 capsule (500 mg) by mouth 3 times daily for 7 days 21 capsule 0     Cholecalciferol (VITAMIN D) 1000 UNITS capsule Take 1 capsule by mouth daily       diclofenac (VOLTAREN) 50 MG EC tablet Take 1 tablet (50 mg) by mouth 3 times daily as needed for joint pain. Take with food. 60 tablet 1     fish oil-omega-3 fatty acids 1000 MG capsule Take 1 capsule by mouth Every other day.        lisinopril (PRINIVIL/ZESTRIL) 20 MG tablet Take 1 tablet (20 mg) by mouth daily for blood pressure. 90 tablet 1        Allergies:     Allergies   Allergen Reactions     Clindamycin Rash        Family History: family history includes Alzheimer Disease (age of onset: 86) in his paternal uncle; Aneurysm in his paternal grandfather and paternal uncle; Arrhythmia (age of onset: 89) in his mother; Arthritis in his paternal grandfather; Cancer in his maternal aunt and sister; Deep Vein Thrombosis (age of onset: 65) in his brother; Heart Disease in his paternal uncle; Hypertension in his brother and mother; Kidney Disease (age of onset: 63) in his brother; Myocardial Infarction (age of onset: 64) in his cousin; Pancreatic Cancer in his maternal aunt; Prostate Cancer in his maternal uncle.     Social History:  reports that he has never smoked. He has never used smokeless tobacco. He reports that he drinks alcohol. He reports that he does not use  "drugs.    Review of Systems:  ROS: 10 point ROS neg other than the symptoms noted above in the HPI and past medical history.    Physical Exam  GENERAL APPEARANCE: healthy, alert, no distress.   SKIN: no suspicious lesions or rashes  NEURO: Normal strength and tone, mentation intact and speech normal  PSYCH:  mentation appears normal and affect normal. Not anxious.  RESPIRATORY: No increased work of breathing.    /75   Resp 16   Ht 1.721 m (5' 7.75\")   Wt 92.5 kg (204 lb)   BMI 31.25 kg/m        right HAND EXAM:      2 small scabs dorsum of hand just proximal to the third metacarpal, one radial and one ulnar. There is mild swelling. Some contracture of the dorsal skin with extension. With flexion, slight protuberance of the skin.  Tender to palpation over the dorsum of the distal 3rd metacarpal. Nontender to palpation third metacarpal phalangeal joint. Nontender to palpation remainder of the hand.  Dry. No drainage.    Stable to radial and ulnar stressing. Stable to anterior and posterior translation of metacarpal phalangeal joint, pip and dip joints.  Intact sensation to light touch in median, radial, ulnar nerves of the hand  Intact sensation to the radial and ulnar digital nerves of the finger, as well as the finger tip.  Brisk capillary refill to all fingers.   Palpable radial pulse, 2+.    X-rays:  3 views right hand from 6/5/2019 were reviewed in clinic today. On my review, there is ~1cm metallic wire over the dorsum of the distal 3rd metacarpal, just proximal to the metacarpal phalangeal joint.    Assessment: 70yo RHD male with right hand puncture wound retain, infected foreign body.    Plan:  * xrays reviewed, indeed appears to be a small piece of wire under the skin  * recommend I+D, foreign body removal  * risks and benefits discussed. Risks of procedure include, but not limited to: bleeding, infection, pain, scar, damage to adjacent structures (e.g. Nerves, blood vessels, bone, cartilage, " tendon), temporary or permanent nerve damage, stiffness, need for further surgery.  * he elects to proceed. See procedure note below  * antibiotics x7 more days  * keep wound clean and dry.  * reassess 10days for wound check, suture removal, sooner if needed      Mehdi Capps M.D., M.S.  Dept. of Orthopaedic Surgery  [facility]    Incision and drainage  Date/Time: 6/5/2019 2:30 PM  Performed by: Devonte Banks PA  Authorized by: Devonte Banks PA     Consent:     Consent obtained:  Written    Consent given by:  Patient    Risks discussed:  Bleeding, incomplete drainage, infection and pain (tendon damage, failure to remove foriegn body)    Alternatives discussed: Dr. Capps's preference was to perform in the operating room on 6/7/19, but patient's circumstances wouldn't allow for that.   Location:     Indications for incision and drainage: foreign body.    Size:  Just over 1cm in length piece of metal    Location: right hand.  Pre-procedure details:     Skin preparation:  Betadine  Anesthesia (see MAR for exact dosages):     Anesthesia method:  Local infiltration    Local anesthetic:  Bupivacaine 0.25% w/o epi and lidocaine 1% w/o epi (5cc of mixed local)  Procedure type:     Complexity:  Simple  Procedure details:     Needle aspiration: no      Incision types:  Single straight    Incision depth:  Subcutaneous    Scalpel blade:  10    Wound management:  Probed and deloculated and irrigated with saline    Drainage:  Purulent (1 cc purulent white discharge upon initial incision)    Drainage amount:  Scant    Wound treatment: A single 4.0 nylon horizontal mattress suture.  Post-procedure details:     Patient tolerance of procedure:  Tolerated well, no immediate complications (hemostasis achieved during procedure with tourniquet and after procedure with battery operated cautery)  Comments:      Patient advised to keep incision dry, clean and covered. Given betadine swabs to swipe incision 3x  daily during dressing changes. Advised to wear nitrile exam type gloves or similar on upcoming fishing trip as needed. Ok to shower and pat dry.          Again, thank you for allowing me to participate in the care of your patient.        Sincerely,        Mehdi Capps MD

## 2019-06-05 NOTE — PROGRESS NOTES
Chief Complaint:   Chief Complaint   Patient presents with     Left Hand - Pain     Right hand pain. Onset: 6/2/19 brushed his hand against some chicken wire and suspects a foreign body. Right hand dominant. Had tetanus and put on abx.         Florencio Doshi is seen today in the [facility] for evaluation of right hand injury at the request of Martir Le PA-C        HPI: Florencio Dohsi is a 69 year old male , right -hand dominant, who presents for evaluation and management of a right hand injury. He injured his hand on 6/2/2019 while working in his garden, caught the top of his hand on some chicken wire.  Was seen in urgent care the next day with pain, swelling. Started on antibiotics. It has been 3 days since the initial injury. He thinks something is in there. Overall pain, swelling improved since onset. Had tetanus at urgent care. Leaving for [place] on Friday for fishing trip.      He reports having mild pain/discomfort around the injury site. He denies associated numbness or tingling. He denies any other injuries to his upper extremity.   Symptoms: pain, swelling.  Location of symptoms: top of right hand.  Pain severity: 4/10  Pain quality: dull, aching, sharp and throbbing  Frequency of symptoms: frequently    Previous treatment: antibiotics.    Past medical history:  has a past medical history of Atherosclerosis of aorta (H) (9/17/2010), BPH, CARDIOVASCULAR SCREENING; LDL GOAL LESS THAN 100 (10/31/2010), Dislocation of elbow, left, open (6/19/12), Diverticulosis of colon, Elevated blood pressure reading without diagnosis of hypertension (9/1/2015), Elevated PSA, Epididymal cyst, Essential hypertension with goal blood pressure less than 140/90, Fall from ladder (6/19/12), Hand eczema, Open fracture of left distal radius (6/19/12), Other atherosclerosis of native arteries of the extremities (9/17/2010), Psoriasis, and Pyelonephritis (1964). He also has no past medical  history of Acne vulgaris, Actinic keratosis, Allergies, Basal cell carcinoma, Bleeding disorder (H), Cancer (H), Degenerative joint disease, Eczema, Heart valve disorder, Inflammatory arthritis, Kidney disease, Malignant melanoma nos, Pacemaker, Photosensitive contact dermatitis, Skin cancer, Squamous cell carcinoma, Surgical complications, Type II or unspecified type diabetes mellitus without mention of complication, not stated as uncontrolled, Unspecified asthma(493.90), or Urticaria.   Patient Active Problem List    Diagnosis Date Noted     Essential hypertension with goal blood pressure less than 140/90 09/01/2015     Priority: Medium     NAFLD (nonalcoholic fatty liver disease) 10/30/2014     Priority: Medium     Elevated transaminase level 10/30/2014     Priority: Medium     Obesity, Class I, BMI 30-34.9 08/27/2013     Priority: Medium     Impaired fasting glucose 02/07/2012     Priority: Medium     Gynecomastia 05/24/2011     Priority: Medium     Advance care planning 05/16/2011     Priority: Medium     Advance Care Planning 9/8/2016: Discussed advance care planning with patient; information given to patient to review. September 6, 2016  Advance Care Planning 5/16/2011: Discussed advance care planning with patient; however, patient declined at this time. 5/16/2011 Lesly Grajeda MA                 Psoriasis 05/02/2011     Priority: Medium     CARDIOVASCULAR SCREENING; LDL GOAL LESS THAN 100 10/31/2010     Priority: Medium     Atherosclerosis of aorta (H) 09/17/2010     Priority: Medium     U/S 9/14/10 and 9/28/16: no aneurysm.       Atherosclerosis of native artery of left lower extremity (H) 09/17/2010     Priority: Medium         Past surgical history:  has a past surgical history that includes REPAIR FINGER/HAND FLEXOR NOT ZONE 2, EACH (1991); prostate biopsy, needle, saturation sampling (7/31/07); HAND/FINGER SURGERY UNLISTED; Open reduction internal fixation forearm (6/19/12); Open reduction internal  fixation elbow (6/19/12); and Colonoscopy with CO2 insufflation (N/A, 10/19/2017).     Medications:    Current Outpatient Medications   Medication Sig Dispense Refill     ASPIRIN 81 MG PO TABS 1 TABLET DAILY*       atorvastatin (LIPITOR) 40 MG tablet Take 1 tablet (40 mg) by mouth daily for cholesterol. 90 tablet 1     cephALEXin (KEFLEX) 500 MG capsule Take 1 capsule (500 mg) by mouth 3 times daily for 7 days 21 capsule 0     Cholecalciferol (VITAMIN D) 1000 UNITS capsule Take 1 capsule by mouth daily       diclofenac (VOLTAREN) 50 MG EC tablet Take 1 tablet (50 mg) by mouth 3 times daily as needed for joint pain. Take with food. 60 tablet 1     fish oil-omega-3 fatty acids 1000 MG capsule Take 1 capsule by mouth Every other day.        lisinopril (PRINIVIL/ZESTRIL) 20 MG tablet Take 1 tablet (20 mg) by mouth daily for blood pressure. 90 tablet 1        Allergies:     Allergies   Allergen Reactions     Clindamycin Rash        Family History: family history includes Alzheimer Disease (age of onset: 86) in his paternal uncle; Aneurysm in his paternal grandfather and paternal uncle; Arrhythmia (age of onset: 89) in his mother; Arthritis in his paternal grandfather; Cancer in his maternal aunt and sister; Deep Vein Thrombosis (age of onset: 65) in his brother; Heart Disease in his paternal uncle; Hypertension in his brother and mother; Kidney Disease (age of onset: 63) in his brother; Myocardial Infarction (age of onset: 64) in his cousin; Pancreatic Cancer in his maternal aunt; Prostate Cancer in his maternal uncle.     Social History:  reports that he has never smoked. He has never used smokeless tobacco. He reports that he drinks alcohol. He reports that he does not use drugs.    Review of Systems:  ROS: 10 point ROS neg other than the symptoms noted above in the HPI and past medical history.    Physical Exam  GENERAL APPEARANCE: healthy, alert, no distress.   SKIN: no suspicious lesions or rashes  NEURO: Normal  "strength and tone, mentation intact and speech normal  PSYCH:  mentation appears normal and affect normal. Not anxious.  RESPIRATORY: No increased work of breathing.    /75   Resp 16   Ht 1.721 m (5' 7.75\")   Wt 92.5 kg (204 lb)   BMI 31.25 kg/m       right HAND EXAM:      2 small scabs dorsum of hand just proximal to the third metacarpal, one radial and one ulnar. There is mild swelling. Some contracture of the dorsal skin with extension. With flexion, slight protuberance of the skin.  Tender to palpation over the dorsum of the distal 3rd metacarpal. Nontender to palpation third metacarpal phalangeal joint. Nontender to palpation remainder of the hand.  Dry. No drainage.    Stable to radial and ulnar stressing. Stable to anterior and posterior translation of metacarpal phalangeal joint, pip and dip joints.  Intact sensation to light touch in median, radial, ulnar nerves of the hand  Intact sensation to the radial and ulnar digital nerves of the finger, as well as the finger tip.  Brisk capillary refill to all fingers.   Palpable radial pulse, 2+.    X-rays:  3 views right hand from 6/5/2019 were reviewed in clinic today. On my review, there is ~1cm metallic wire over the dorsum of the distal 3rd metacarpal, just proximal to the metacarpal phalangeal joint.    Assessment: 70yo RHD male with right hand puncture wound retain, infected foreign body.    Plan:  * xrays reviewed, indeed appears to be a small piece of wire under the skin  * recommend I+D, foreign body removal  * risks and benefits discussed. Risks of procedure include, but not limited to: bleeding, infection, pain, scar, damage to adjacent structures (e.g. Nerves, blood vessels, bone, cartilage, tendon), temporary or permanent nerve damage, stiffness, need for further surgery.  * he elects to proceed. See procedure note below  * antibiotics x7 more days  * keep wound clean and dry.  * reassess 10days for wound check, suture removal, sooner if " needed      Mehdi Capps M.D., M.S.  Dept. of Orthopaedic Surgery  [facility]    Incision and drainage  Date/Time: 6/5/2019 2:30 PM  Performed by: Devonte Banks PA  Authorized by: Devonte Banks PA     Consent:     Consent obtained:  Written    Consent given by:  Patient    Risks discussed:  Bleeding, incomplete drainage, infection and pain (tendon damage, failure to remove foriegn body)    Alternatives discussed: Dr. Capps's preference was to perform in the operating room on 6/7/19, but patient's circumstances wouldn't allow for that.   Location:     Indications for incision and drainage: foreign body.    Size:  Just over 1cm in length piece of metal    Location: right hand.  Pre-procedure details:     Skin preparation:  Betadine  Anesthesia (see MAR for exact dosages):     Anesthesia method:  Local infiltration    Local anesthetic:  Bupivacaine 0.25% w/o epi and lidocaine 1% w/o epi (5cc of mixed local)  Procedure type:     Complexity:  Simple  Procedure details:     Needle aspiration: no      Incision types:  Single straight    Incision depth:  Subcutaneous    Scalpel blade:  10    Wound management:  Probed and deloculated and irrigated with saline    Drainage:  Purulent (1 cc purulent white discharge upon initial incision)    Drainage amount:  Scant    Wound treatment: A single 4.0 nylon horizontal mattress suture.  Post-procedure details:     Patient tolerance of procedure:  Tolerated well, no immediate complications (hemostasis achieved during procedure with tourniquet and after procedure with battery operated cautery)  Comments:      Patient advised to keep incision dry, clean and covered. Given betadine swabs to swipe incision 3x daily during dressing changes. Advised to wear nitrile exam type gloves or similar on upcoming fishing trip as needed. Ok to shower and pat dry.

## 2019-06-17 ENCOUNTER — ALLIED HEALTH/NURSE VISIT (OUTPATIENT)
Dept: NURSING | Facility: CLINIC | Age: 69
End: 2019-06-17
Payer: MEDICARE

## 2019-06-17 DIAGNOSIS — Z48.02 VISIT FOR SUTURE REMOVAL: Primary | ICD-10-CM

## 2019-06-17 NOTE — NURSING NOTE
Suture removal:     Date sutures applied: 6/5/19         Where (setting) in which they applied: Ortho visit    Description:  Type: sutures  Location: right middle knuckle    History:    Cause of laceration: Chicken wire poked into skin    Accompanying Signs & Symptoms:   Redness: no  Warmth: no  Drainage: no  Still bleeding: no  Fevers: no    Last tetanus shot: last tetanus booster within 10 years      Florencio Doshi presents to the clinic today for removal of sutures.  The patient has had the suture in place for 12 days.  There has been no history of infection or drainage.  1 suture are seen located on the right middle knuckle.  The wound is healing well with no signs of infection.  Tetanus status is up to date.   All suture were easily removed today.  Routine wound care discussed.  The patient will follow up as needed.        Ed Gupta RN, BSN, PHN

## 2019-08-01 ENCOUNTER — OFFICE VISIT (OUTPATIENT)
Dept: FAMILY MEDICINE | Facility: CLINIC | Age: 69
End: 2019-08-01
Payer: MEDICARE

## 2019-08-01 VITALS
RESPIRATION RATE: 16 BRPM | OXYGEN SATURATION: 96 % | SYSTOLIC BLOOD PRESSURE: 133 MMHG | HEIGHT: 68 IN | HEART RATE: 64 BPM | BODY MASS INDEX: 30.68 KG/M2 | TEMPERATURE: 98.9 F | DIASTOLIC BLOOD PRESSURE: 76 MMHG | WEIGHT: 202.4 LBS

## 2019-08-01 DIAGNOSIS — L23.7 ALLERGIC CONTACT DERMATITIS DUE TO PLANTS, EXCEPT FOOD: ICD-10-CM

## 2019-08-01 PROCEDURE — 99213 OFFICE O/P EST LOW 20 MIN: CPT | Performed by: FAMILY MEDICINE

## 2019-08-01 RX ORDER — TRIAMCINOLONE ACETONIDE 5 MG/G
CREAM TOPICAL
Qty: 30 G | Refills: 0 | Status: SHIPPED | OUTPATIENT
Start: 2019-08-01 | End: 2019-10-01

## 2019-08-01 RX ORDER — PREDNISONE 20 MG/1
20 TABLET ORAL
Qty: 6 TABLET | Refills: 0 | Status: SHIPPED | OUTPATIENT
Start: 2019-08-01 | End: 2019-08-07

## 2019-08-01 ASSESSMENT — PAIN SCALES - GENERAL: PAINLEVEL: NO PAIN (0)

## 2019-08-01 ASSESSMENT — MIFFLIN-ST. JEOR: SCORE: 1658.61

## 2019-08-01 NOTE — PROGRESS NOTES
"Subjective     Florencio Doshi is a 69 year old male who presents to clinic today for the following health issues:    HPI     Concern - derm concern  Onset: 1 week    Description:   Red, itchy, raised, both arms    Intensity: mild    Progression of Symptoms:  same    Accompanying Signs & Symptoms:  None    Previous history of similar problem:   Yes - last year, same time of the year (see 8/9/18)    Precipitating factors:     No plant exposure (suspected allergic contact derm last year), only sun exposure  Worsened by: itching    Alleviating factors:  Improved by: None    Therapies Tried and outcome: Hydrocortisone - no help noted    Past medical, family, and social histories, medications, and allergies are reviewed and updated in [facility].     Review of Systems   ROS COMP: Constitutional, HEENT, cardiovascular, pulmonary, gi and gu systems are negative, except as otherwise noted.      Objective    /76 (BP Location: Left arm, Patient Position: Sitting, Cuff Size: Adult Regular)   Pulse 64   Temp 98.9  F (37.2  C) (Oral)   Resp 16   Ht 1.721 m (5' 7.75\")   Wt 91.8 kg (202 lb 6.4 oz)   SpO2 96%   BMI 31.00 kg/m    Body mass index is 31 kg/m .  Physical Exam   GENERAL: healthy, alert and no distress  EYES: Eyes grossly normal to inspection, PERRL, EOMI, sclerae white and conjunctivae normal  MS: no gross musculoskeletal defects noted, no edema  SKIN: nonspecific erythematous rash on sun-exposed sides of forearm (where he is tan)  NEURO: Normal strength and tone, sensory exam grossly normal, mentation intact, oriented times 3 and cranial nerves 2-12 intact  PSYCH: mentation appears normal, affect normal/bright             Assessment & Plan     (L23.7) Allergic contact dermatitis due to plants, except food  Comment: cause less obvious, but  Plan: triamcinolone (ARISTOCORT HP) 0.5 % external         cream, predniSONE (DELTASONE) 20 MG tablet        I will treat the same way as last time since he reports it was " very successful      Return in about 7 weeks (around 9/18/2019) for full physical, lab tests, recheck medications.    Ant Wright MD  [facility]

## 2019-08-08 DIAGNOSIS — N52.9 ERECTILE DYSFUNCTION, UNSPECIFIED ERECTILE DYSFUNCTION TYPE: ICD-10-CM

## 2019-08-08 NOTE — TELEPHONE ENCOUNTER
"Requested Prescriptions   Pending Prescriptions Disp Refills     sildenafil (REVATIO) 20 MG tablet [Pharmacy Med Name: SILDENAFIL CITRATE 20MG TABS]  Last Written Prescription Date:  09/12/17  Last Fill Quantity: 50,  # refills: 0   Last Office Visit with [facility], P or [facility] prescribing provider:  08/01/19Oly   Future Office Visit:    50 tablet 0     Sig: TAKE ONE TO THREE TABLETS BY MOUTH DAILY AS NEEDED, ONE TO THREE HOURS BEFORE INTAMACY; NEVER USE WITH NITROGLYCERIN, TERAZOSIN OR DOXAZOSIN       Erectile Dysfuction Protocol Failed - 8/8/2019  9:55 AM        Failed - Medication is active on med list        Passed - Absence of nitrates on medication list        Passed - Absence of Alpha Blockers on Med list        Passed - Recent (12 mo) or future (30 days) visit within the authorizing provider's specialty     Patient had office visit in the last 12 months or has a visit in the next 30 days with authorizing provider or within the authorizing provider's specialty.  See \"Patient Info\" tab in inbasket, or \"Choose Columns\" in Meds & Orders section of the refill encounter.              Passed - Patient is age 18 or older          "

## 2019-08-09 NOTE — TELEPHONE ENCOUNTER
Routing refill request to provider for review/approval because:  Drug not active on patient's medication list.    Cecile Freeman RN, [facility]

## 2019-08-10 RX ORDER — SILDENAFIL CITRATE 20 MG/1
TABLET ORAL
Qty: 50 TABLET | Refills: 3 | Status: SHIPPED | OUTPATIENT
Start: 2019-08-10 | End: 2020-09-03

## 2019-08-12 DIAGNOSIS — I10 ESSENTIAL HYPERTENSION WITH GOAL BLOOD PRESSURE LESS THAN 140/90: ICD-10-CM

## 2019-08-12 NOTE — TELEPHONE ENCOUNTER
"Requested Prescriptions   Pending Prescriptions Disp Refills     lisinopril (PRINIVIL/ZESTRIL) 20 MG tablet  Last Written Prescription Date:  3/5/19  Last Fill Quantity: 90,  # refills: 1   Last Office Visit with G, P or [facility] prescribing provider:  8/1/19   Future Office Visit:      90 tablet 1     Sig: Take 1 tablet (20 mg) by mouth daily for blood pressure.       ACE Inhibitors (Including Combos) Protocol Passed - 8/12/2019  4:10 PM        Passed - Blood pressure under 140/90 in past 12 months     BP Readings from Last 3 Encounters:   08/01/19 133/76   06/05/19 122/75   06/03/19 138/85                 Passed - Recent (12 mo) or future (30 days) visit within the authorizing provider's specialty     Patient had office visit in the last 12 months or has a visit in the next 30 days with authorizing provider or within the authorizing provider's specialty.  See \"Patient Info\" tab in inbasket, or \"Choose Columns\" in Meds & Orders section of the refill encounter.              Passed - Medication is active on med list        Passed - Patient is age 18 or older        Passed - Normal serum creatinine on file in past 12 months     Recent Labs   Lab Test 03/05/19  0953   CR 1.01             Passed - Normal serum potassium on file in past 12 months     Recent Labs   Lab Test 03/05/19  0953   POTASSIUM 4.8               "

## 2019-08-13 RX ORDER — LISINOPRIL 20 MG/1
20 TABLET ORAL DAILY
Qty: 90 TABLET | Refills: 0 | Status: SHIPPED | OUTPATIENT
Start: 2019-08-13 | End: 2019-10-01

## 2019-08-13 NOTE — TELEPHONE ENCOUNTER
Prescription approved per [facility] Refill Protocol.  Estephanie Chavez RN

## 2019-09-12 ENCOUNTER — OFFICE VISIT (OUTPATIENT)
Dept: DERMATOLOGY | Facility: CLINIC | Age: 69
End: 2019-09-12
Payer: MEDICARE

## 2019-09-12 DIAGNOSIS — D22.9 MULTIPLE BENIGN NEVI: ICD-10-CM

## 2019-09-12 DIAGNOSIS — L23.9 ALLERGIC CONTACT DERMATITIS, UNSPECIFIED TRIGGER: Primary | ICD-10-CM

## 2019-09-12 DIAGNOSIS — L82.1 SEBORRHEIC KERATOSIS: ICD-10-CM

## 2019-09-12 DIAGNOSIS — L73.8 SENILE SEBACEOUS GLAND HYPERPLASIA: ICD-10-CM

## 2019-09-12 RX ORDER — TRIAMCINOLONE ACETONIDE 1 MG/G
OINTMENT TOPICAL 2 TIMES DAILY
Qty: 80 G | Refills: 0 | Status: SHIPPED | OUTPATIENT
Start: 2019-09-12 | End: 2020-08-01 | Stop reason: ALTCHOICE

## 2019-09-12 ASSESSMENT — PAIN SCALES - GENERAL: PAINLEVEL: NO PAIN (0)

## 2019-09-12 NOTE — PROGRESS NOTES
[facility] Dermatology Note      Dermatology Problem List:  1. Benign bx:  - Inflamed SK, R scalp, bx 3/29/18  2. Verruca vulgaris, L index finger, cryo 12/13/2018.  - Yash acid nightly  3. Lesion to monitor, R mid-frontal scalp, photo 12/13/2018.  4. Allergic contact dermatitis: allergan could have been plant exposure vs dust from saw-dust from cutting siding   - triamcinolone 0.1% ointment BID x 4 weeks    Encounter Date: Sep 12, 2019    CC:   Chief Complaint   Patient presents with     Derm Problem     Florencio is here today for rash on bilateral arms. Jamison states he was seen one month ago (Dr. Wright), was given tramcinolone and prednisone. Reports the rash the coming back.          History of Present Illness:  Mr. Florencio Doshi is a 69 year old male who presents for evaluation of a rash that he developed over a month ago that was treated with oral prednisone and topical triamcinolone cream, but has started to come back in the last 3 weeks. Patient reports this rash started after he had been working outside all day installing siding on a house. He was cutting the siding and recalls getting a lot of dust on both forearms. It was also hot that day and he was very sweaty. He says he had a similar reaction a year ago when he was working out in the yard cutting down brush and was also very sweaty. He presented to his PCP who prescribed prednisone 20 mg PO daily x 6 days and also topical triamcinolone 0.5% cream which he used x 1 week only. The rash disappeared so he stopped using the cream. Roughly 3 weeks ago the rash started coming back. It is only mildly itchy.   He is seen yearly for a skin cancer screening. Last seen in December 2018. He has no history of skin cancer, but has had AKs treated. He denies any new/changing moles, non-healing spots, tender areas. Otherwise feeling well today.       Past Medical History:   Patient Active Problem List   Diagnosis     Atherosclerosis of aorta (H)      Atherosclerosis of native artery of left lower extremity (H)     CARDIOVASCULAR SCREENING; LDL GOAL LESS THAN 100     Psoriasis     Advance care planning     Gynecomastia     Impaired fasting glucose     Obesity, Class I, BMI 30-34.9     NAFLD (nonalcoholic fatty liver disease)     Elevated transaminase level     Essential hypertension with goal blood pressure less than 140/90     Past Medical History:   Diagnosis Date     Atherosclerosis of aorta (H) 9/17/2010     BPH      CARDIOVASCULAR SCREENING; LDL GOAL LESS THAN 100 10/31/2010    no hx of hyperlipidemia     Dislocation of elbow, left, open 6/19/12     Diverticulosis of colon     pan-     Elevated blood pressure reading without diagnosis of hypertension 9/1/2015     Elevated PSA      Epididymal cyst     RT     Essential hypertension with goal blood pressure less than 140/90      Fall from ladder 6/19/12     Hand eczema      Open fracture of left distal radius 6/19/12     Other atherosclerosis of native arteries of the extremities 9/17/2010     Psoriasis     palms     Pyelonephritis 1964     Past Surgical History:   Procedure Laterality Date     C HAND/FINGER SURGERY UNLISTED       COLONOSCOPY WITH CO2 INSUFFLATION N/A 10/19/2017    Procedure: COLONOSCOPY WITH CO2 INSUFFLATION;  COLON SCREEN/ COHEN;  Surgeon: Tristan Tipton MD;  Location: [facility] OR     HC REPAIR FINGER/HAND FLEXOR NOT ZONE 2, EACH  1991    LT index     OPEN REDUCTION INTERNAL FIXATION ELBOW  6/19/12    LT medial tendon/ligament repair, Dr. Jameel Tian     OPEN REDUCTION INTERNAL FIXATION FOREARM  6/19/12    LT distal radius, Dr. Jameel Tian     PROSTATE BIOPSY, NEEDLE, SATURATION SAMPLING  7/31/07    Dr. Waterman       Social History:  Patient reports that he has never smoked. He has never used smokeless tobacco. He reports that he drinks alcohol. He reports that he does not use drugs.    Family History:  Family History   Problem Relation Age of Onset     Cancer Sister         uterine or  ovarian     Hypertension Brother      Aneurysm Paternal Uncle         AAA     Heart Disease Paternal Uncle      Prostate Cancer Maternal Uncle      Cancer Maternal Aunt         bladder or ureter?     Hypertension Mother      Arrhythmia Mother 89        pacemaker for bradycardia     Arthritis Paternal Grandfather         gout     Aneurysm Paternal Grandfather         AAA     Kidney Disease Brother 63        kidney stone, hematuria     Deep Vein Thrombosis Brother 65     Myocardial Infarction Cousin 64     Pancreatic Cancer Maternal Aunt      Alzheimer Disease Paternal Uncle 86     Melanoma No family hx of      Skin Cancer No family hx of        Medications:  Current Outpatient Medications   Medication Sig Dispense Refill     ASPIRIN 81 MG PO TABS 1 TABLET DAILY*       atorvastatin (LIPITOR) 40 MG tablet Take 1 tablet (40 mg) by mouth daily for cholesterol. 90 tablet 1     Cholecalciferol (VITAMIN D) 1000 UNITS capsule Take 1 capsule by mouth daily       diclofenac (VOLTAREN) 50 MG EC tablet Take 1 tablet (50 mg) by mouth 3 times daily as needed for joint pain. Take with food. 60 tablet 1     fish oil-omega-3 fatty acids 1000 MG capsule Take 1 capsule by mouth Every other day.        lisinopril (PRINIVIL/ZESTRIL) 20 MG tablet Take 1 tablet (20 mg) by mouth daily for blood pressure. 90 tablet 0     sildenafil (REVATIO) 20 MG tablet TAKE ONE TO THREE TABLETS BY MOUTH DAILY AS NEEDED, ONE TO THREE HOURS BEFORE INTAMACY; NEVER USE WITH NITROGLYCERIN, TERAZOSIN OR DOXAZOSIN 50 tablet 3     triamcinolone (ARISTOCORT HP) 0.5 % external cream Apply sparingly to affected area (forearm rash) three times daily. 30 g 0        Allergies   Allergen Reactions     Clindamycin Rash         Review of Systems:  -As per HPI  -Constitutional: Otherwise feeling well today, in usual state of health.  -HEENT: Patient denies nonhealing oral sores.  -Skin: As above in HPI. No additional skin concerns.    Physical exam:  Vitals: There were no  vitals taken for this visit.  GEN: This is a well developed, well-nourished male in no acute distress, in a pleasant mood.    SKIN: Waist-up skin, which includes the head/face, neck, both arms, chest, back, abdomen, digits and/or nails was examined.  -Z[place]a skin type: II  -There are yellow oily papules with central umbilication located on the forehead and cheeks.  - Fleshy pedunculated brown papules around the neck line  - there are waxy, stuck on-appearing papules on the left left chest, and patient points another out on his left medial distal thigh  -there are subtle pink scaly plaques on the bilateral forearms  - There are well circumscribed, symmetric tan to brown pigmented macules and papules on the trunk and arms.   -No other lesions of concern on areas examined.     Impression/Plan:  1. Allergic contact dermatitis: allergan is possibly plant vs saw-dust from cutting siding. Was treated with a low dose of prednisone and tapered quickly. Also stopped topicals quickly.     Start triamcinolone 0.1% ointment BID to affected areas of forearms for 4 weeks    2. Seborrheic keratosis, non irritated    Benign natured discussed, no treatment indicated.     3. Sebaceous hyperplasia    Benign natured discussed, no treatment indicated.     4. Multiple benign nevi    Benign natured discussed, no treatment indicated.     CC Referred Self, MD  No address on file on close of this encounter.  Follow-up in 1 year, earlier for new or changing lesions.       Dr. Harris staffed the patient.    Staff Involved:  Resident(Des)/Staff    Marty Nunes MD, PhD  Medicine-Dermatology PGY-4  I, Kelley Harris MD, saw this patient with the resident and agree with the resident s findings and plan of care as documented in the resident s note.

## 2019-09-12 NOTE — LETTER
9/12/2019       RE: Florencio Doshi  [address] [place] [address]     Dear Colleague,    Thank you for referring your patient, Florencio Doshi, to the [facility] DERMATOLOGY at [facility]. Please see a copy of my visit note below.    [facility] Dermatology Note      Dermatology Problem List:  1. Benign bx:  - Inflamed SK, R scalp, bx 3/29/18  2. Verruca vulgaris, L index finger, cryo 12/13/2018.  - Yash acid nightly  3. Lesion to monitor, R mid-frontal scalp, photo 12/13/2018.  4. Allergic contact dermatitis: allergan could have been plant exposure vs dust from saw-dust from cutting siding   - triamcinolone 0.1% ointment BID x 4 weeks    Encounter Date: Sep 12, 2019    CC:   Chief Complaint   Patient presents with     Derm Problem     Florencio is here today for rash on bilateral arms. Jamison states he was seen one month ago (Dr. Wright), was given tramcinolone and prednisone. Reports the rash the coming back.          History of Present Illness:  Mr. Florencio Doshi is a 69 year old male who presents for evaluation of a rash that he developed over a month ago that was treated with oral prednisone and topical triamcinolone cream, but has started to come back in the last 3 weeks. Patient reports this rash started after he had been working outside all day installing siding on a house. He was cutting the siding and recalls getting a lot of dust on both forearms. It was also hot that day and he was very sweaty. He says he had a similar reaction a year ago when he was working out in the yard cutting down brush and was also very sweaty. He presented to his PCP who prescribed prednisone 20 mg PO daily x 6 days and also topical triamcinolone 0.5% cream which he used x 1 week only. The rash disappeared so he stopped using the cream. Roughly 3 weeks ago the rash started coming back. It is only mildly itchy.   He is seen yearly for a skin cancer screening.  Last seen in December 2018. He has no history of skin cancer, but has had AKs treated. He denies any new/changing moles, non-healing spots, tender areas. Otherwise feeling well today.       Past Medical History:   Patient Active Problem List   Diagnosis     Atherosclerosis of aorta (H)     Atherosclerosis of native artery of left lower extremity (H)     CARDIOVASCULAR SCREENING; LDL GOAL LESS THAN 100     Psoriasis     Advance care planning     Gynecomastia     Impaired fasting glucose     Obesity, Class I, BMI 30-34.9     NAFLD (nonalcoholic fatty liver disease)     Elevated transaminase level     Essential hypertension with goal blood pressure less than 140/90     Past Medical History:   Diagnosis Date     Atherosclerosis of aorta (H) 9/17/2010     BPH      CARDIOVASCULAR SCREENING; LDL GOAL LESS THAN 100 10/31/2010    no hx of hyperlipidemia     Dislocation of elbow, left, open 6/19/12     Diverticulosis of colon     pan-     Elevated blood pressure reading without diagnosis of hypertension 9/1/2015     Elevated PSA      Epididymal cyst     RT     Essential hypertension with goal blood pressure less than 140/90      Fall from ladder 6/19/12     Hand eczema      Open fracture of left distal radius 6/19/12     Other atherosclerosis of native arteries of the extremities 9/17/2010     Psoriasis     palms     Pyelonephritis 1964     Past Surgical History:   Procedure Laterality Date     C HAND/FINGER SURGERY UNLISTED       COLONOSCOPY WITH CO2 INSUFFLATION N/A 10/19/2017    Procedure: COLONOSCOPY WITH CO2 INSUFFLATION;  COLON SCREEN/ COHEN;  Surgeon: Tristan Tipton MD;  Location: [facility] OR     HC REPAIR FINGER/HAND FLEXOR NOT ZONE 2, EACH  1991    LT index     OPEN REDUCTION INTERNAL FIXATION ELBOW  6/19/12    LT medial tendon/ligament repair, Dr. Jameel Tian     OPEN REDUCTION INTERNAL FIXATION FOREARM  6/19/12    LT distal radius, Dr. Jameel Tian     PROSTATE BIOPSY, NEEDLE, SATURATION SAMPLING  7/31/07      [place]       Social History:  Patient reports that he has never smoked. He has never used smokeless tobacco. He reports that he drinks alcohol. He reports that he does not use drugs.    Family History:  Family History   Problem Relation Age of Onset     Cancer Sister         uterine or ovarian     Hypertension Brother      Aneurysm Paternal Uncle         AAA     Heart Disease Paternal Uncle      Prostate Cancer Maternal Uncle      Cancer Maternal Aunt         bladder or ureter?     Hypertension Mother      Arrhythmia Mother 89        pacemaker for bradycardia     Arthritis Paternal Grandfather         gout     Aneurysm Paternal Grandfather         AAA     Kidney Disease Brother 63        kidney stone, hematuria     Deep Vein Thrombosis Brother 65     Myocardial Infarction Cousin 64     Pancreatic Cancer Maternal Aunt      Alzheimer Disease Paternal Uncle 86     Melanoma No family hx of      Skin Cancer No family hx of        Medications:  Current Outpatient Medications   Medication Sig Dispense Refill     ASPIRIN 81 MG PO TABS 1 TABLET DAILY*       atorvastatin (LIPITOR) 40 MG tablet Take 1 tablet (40 mg) by mouth daily for cholesterol. 90 tablet 1     Cholecalciferol (VITAMIN D) 1000 UNITS capsule Take 1 capsule by mouth daily       diclofenac (VOLTAREN) 50 MG EC tablet Take 1 tablet (50 mg) by mouth 3 times daily as needed for joint pain. Take with food. 60 tablet 1     fish oil-omega-3 fatty acids 1000 MG capsule Take 1 capsule by mouth Every other day.        lisinopril (PRINIVIL/ZESTRIL) 20 MG tablet Take 1 tablet (20 mg) by mouth daily for blood pressure. 90 tablet 0     sildenafil (REVATIO) 20 MG tablet TAKE ONE TO THREE TABLETS BY MOUTH DAILY AS NEEDED, ONE TO THREE HOURS BEFORE INTAMACY; NEVER USE WITH NITROGLYCERIN, TERAZOSIN OR DOXAZOSIN 50 tablet 3     triamcinolone (ARISTOCORT HP) 0.5 % external cream Apply sparingly to affected area (forearm rash) three times daily. 30 g 0        Allergies   Allergen  Reactions     Clindamycin Rash         Review of Systems:  -As per HPI  -Constitutional: Otherwise feeling well today, in usual state of health.  -HEENT: Patient denies nonhealing oral sores.  -Skin: As above in HPI. No additional skin concerns.    Physical exam:  Vitals: There were no vitals taken for this visit.  GEN: This is a well developed, well-nourished male in no acute distress, in a pleasant mood.    SKIN: Waist-up skin, which includes the head/face, neck, both arms, chest, back, abdomen, digits and/or nails was examined.  -Zuniga skin type: II  -There are yellow oily papules with central umbilication located on the forehead and cheeks.  - Fleshy pedunculated brown papules around the neck line  - there are waxy, stuck on-appearing papules on the left left chest, and patient points another out on his left medial distal thigh  -there are subtle pink scaly plaques on the bilateral forearms  - There are well circumscribed, symmetric tan to brown pigmented macules and papules on the trunk and arms.   -No other lesions of concern on areas examined.     Impression/Plan:  1. Allergic contact dermatitis: allergan is possibly plant vs saw-dust from cutting siding. Was treated with a low dose of prednisone and tapered quickly. Also stopped topicals quickly.     Start triamcinolone 0.1% ointment BID to affected areas of forearms for 4 weeks    2. Seborrheic keratosis, non irritated    Benign natured discussed, no treatment indicated.     3. Sebaceous hyperplasia    Benign natured discussed, no treatment indicated.     4. Multiple benign nevi    Benign natured discussed, no treatment indicated.     CC Referred Self, MD  No address on file on close of this encounter.  Follow-up in 1 year, earlier for new or changing lesions.       Dr. Harris staffed the patient.    Staff Involved:  Resident(Des)/Staff    Marty Nunes MD, PhD  Medicine-Dermatology PGY-4  I, Kelley Harris MD, saw this patient with the  resident and agree with the resident s findings and plan of care as documented in the resident s note.

## 2019-10-01 ENCOUNTER — OFFICE VISIT (OUTPATIENT)
Dept: FAMILY MEDICINE | Facility: CLINIC | Age: 69
End: 2019-10-01
Payer: MEDICARE

## 2019-10-01 VITALS
DIASTOLIC BLOOD PRESSURE: 78 MMHG | SYSTOLIC BLOOD PRESSURE: 134 MMHG | RESPIRATION RATE: 16 BRPM | HEIGHT: 68 IN | BODY MASS INDEX: 30.77 KG/M2 | WEIGHT: 203 LBS | TEMPERATURE: 97.9 F | OXYGEN SATURATION: 94 % | HEART RATE: 53 BPM

## 2019-10-01 DIAGNOSIS — I10 ESSENTIAL HYPERTENSION WITH GOAL BLOOD PRESSURE LESS THAN 140/90: ICD-10-CM

## 2019-10-01 DIAGNOSIS — Z23 NEED FOR PROPHYLACTIC VACCINATION AND INOCULATION AGAINST INFLUENZA: ICD-10-CM

## 2019-10-01 DIAGNOSIS — Z00.00 ENCOUNTER FOR MEDICARE ANNUAL WELLNESS EXAM: Primary | ICD-10-CM

## 2019-10-01 DIAGNOSIS — Z71.89 ADVANCE CARE PLANNING: Chronic | ICD-10-CM

## 2019-10-01 DIAGNOSIS — R73.01 IMPAIRED FASTING GLUCOSE: ICD-10-CM

## 2019-10-01 DIAGNOSIS — I70.202 ATHEROSCLEROSIS OF NATIVE ARTERY OF LEFT LOWER EXTREMITY, WITH UNSPECIFIED PRESENCE OF CLINICAL MANIFESTATION (H): ICD-10-CM

## 2019-10-01 DIAGNOSIS — K76.0 NAFLD (NONALCOHOLIC FATTY LIVER DISEASE): ICD-10-CM

## 2019-10-01 DIAGNOSIS — I70.0 ATHEROSCLEROSIS OF AORTA (H): ICD-10-CM

## 2019-10-01 LAB
ALT SERPL W P-5'-P-CCNC: 59 U/L (ref 0–70)
ANION GAP SERPL CALCULATED.3IONS-SCNC: 4 MMOL/L (ref 3–14)
AST SERPL W P-5'-P-CCNC: 30 U/L (ref 0–45)
BUN SERPL-MCNC: 21 MG/DL (ref 7–30)
CALCIUM SERPL-MCNC: 9.2 MG/DL (ref 8.5–10.1)
CHLORIDE SERPL-SCNC: 108 MMOL/L (ref 94–109)
CHOLEST SERPL-MCNC: 100 MG/DL
CO2 SERPL-SCNC: 30 MMOL/L (ref 20–32)
CREAT SERPL-MCNC: 0.94 MG/DL (ref 0.66–1.25)
GFR SERPL CREATININE-BSD FRML MDRD: 83 ML/MIN/{1.73_M2}
GLUCOSE SERPL-MCNC: 105 MG/DL (ref 70–99)
HBA1C MFR BLD: 5.7 % (ref 0–5.6)
HDLC SERPL-MCNC: 47 MG/DL
LDLC SERPL CALC-MCNC: 31 MG/DL
NONHDLC SERPL-MCNC: 53 MG/DL
POTASSIUM SERPL-SCNC: 4.3 MMOL/L (ref 3.4–5.3)
SODIUM SERPL-SCNC: 142 MMOL/L (ref 133–144)
TRIGL SERPL-MCNC: 112 MG/DL

## 2019-10-01 PROCEDURE — 90662 IIV NO PRSV INCREASED AG IM: CPT | Performed by: FAMILY MEDICINE

## 2019-10-01 PROCEDURE — 36415 COLL VENOUS BLD VENIPUNCTURE: CPT | Performed by: FAMILY MEDICINE

## 2019-10-01 PROCEDURE — 83036 HEMOGLOBIN GLYCOSYLATED A1C: CPT | Performed by: FAMILY MEDICINE

## 2019-10-01 PROCEDURE — G0439 PPPS, SUBSEQ VISIT: HCPCS | Performed by: FAMILY MEDICINE

## 2019-10-01 PROCEDURE — 84450 TRANSFERASE (AST) (SGOT): CPT | Performed by: FAMILY MEDICINE

## 2019-10-01 PROCEDURE — 80061 LIPID PANEL: CPT | Performed by: FAMILY MEDICINE

## 2019-10-01 PROCEDURE — 84460 ALANINE AMINO (ALT) (SGPT): CPT | Performed by: FAMILY MEDICINE

## 2019-10-01 PROCEDURE — 80048 BASIC METABOLIC PNL TOTAL CA: CPT | Performed by: FAMILY MEDICINE

## 2019-10-01 PROCEDURE — G0008 ADMIN INFLUENZA VIRUS VAC: HCPCS | Performed by: FAMILY MEDICINE

## 2019-10-01 RX ORDER — ATORVASTATIN CALCIUM 40 MG/1
40 TABLET, FILM COATED ORAL DAILY
Qty: 90 TABLET | Refills: 1 | Status: SHIPPED | OUTPATIENT
Start: 2019-10-01 | End: 2020-02-10

## 2019-10-01 RX ORDER — LISINOPRIL 20 MG/1
20 TABLET ORAL DAILY
Qty: 90 TABLET | Refills: 0 | Status: SHIPPED | OUTPATIENT
Start: 2019-10-01 | End: 2020-04-09

## 2019-10-01 ASSESSMENT — PAIN SCALES - GENERAL: PAINLEVEL: NO PAIN (0)

## 2019-10-01 ASSESSMENT — MIFFLIN-ST. JEOR: SCORE: 1661.33

## 2019-10-01 NOTE — PROGRESS NOTES
"  SUBJECTIVE:   Florencio Doshi is a 69 year old male who presents for Preventive Visit.    Are you in the first 12 months of your Medicare Part B coverage?  No    Physical Health:    In general, how would you rate your overall physical health? good    Outside of work, how many days during the week do you exercise? 2-3 days/week    Outside of work, approximately how many minutes a day do you exercise?45-60 minutes    If you drink alcohol do you typically have >3 drinks per day or >7 drinks per week? No    Do you usually eat at least 4 servings of fruit and vegetables a day, include whole grains & fiber and avoid regularly eating high fat or \"junk\" foods? NO    Do you have any problems taking medications regularly?  No    Do you have any side effects from medications? none    Needs assistance for the following daily activities: no assistance needed    Which of the following safety concerns are present in your home?  none identified     Hearing impairment: No    In the past 6 months, have you been bothered by leaking of urine? no    Mental Health:    In general, how would you rate your overall mental or emotional health? good  PHQ-2 Score:      Do you feel safe in your environment? Yes    Do you have a Health Care Directive? Yes: Patient states has Advance Directive and will bring in a copy to clinic.    Additional concerns to address?  No    Fall risk:  Fallen 2 or more times in the past year?: No  Any fall with injury in the past year?: No    Cognitive Screenin) Repeat 3 items (Leader, Season, Table)    2) Clock draw: NORMAL  3) 3 item recall: Recalls 3 objects  Results: 3 items recalled: COGNITIVE IMPAIRMENT LESS LIKELY    Mini-CogTM Copyright JR Sin. Licensed by the author for use in [facility]; reprinted with permission (stephen@.[facility]). All rights reserved.      Do you have sleep apnea, excessive snoring or daytime drowsiness?: no      Social History     Tobacco Use     Smoking status: Never " "Smoker     Smokeless tobacco: Never Used   Substance Use Topics     Alcohol use: Yes     Comment: occassionally                           Current providers sharing in care for this patient include:   Patient Care Team:  Ant Wright MD as PCP - General  Ant Wright MD as Assigned PCP  Ant Wright MD as MD (Family Practice)  Yuni Robbisn MD as MD (Internal Medicine)    The following health maintenance items are reviewed in Epic and correct as of today:  Health Maintenance   Topic Date Due     INFLUENZA VACCINE (1) 09/01/2019     FALL RISK ASSESSMENT  09/18/2019     MEDICARE ANNUAL WELLNESS VISIT  09/18/2019     LIPID  03/05/2020     ADVANCE CARE PLANNING  09/08/2021     COLONOSCOPY  10/19/2027     DTAP/TDAP/TD IMMUNIZATION (5 - Td) 06/03/2029     HEPATITIS C SCREENING  Completed     PHQ-2  Completed     PNEUMOCOCCAL IMMUNIZATION 65+ LOW/MEDIUM RISK  Completed     ZOSTER IMMUNIZATION  Completed     AORTIC ANEURYSM SCREENING (SYSTEM ASSIGNED)  Completed     IPV IMMUNIZATION  Aged Out     MENINGITIS IMMUNIZATION  Aged Out       ROS:  Constitutional, HEENT, cardiovascular, pulmonary, gi and gu systems are negative, except as otherwise noted.    Any history above obtained by the Medical Assistant was reviewed by Dr. Ant Wright MD, and edited when necessary.    This document serves as a record of the services and decisions personally performed and made by Dr. Wright. It was created on his behalf by Karolyn Grewal, a trained medical scribe. The creation of this document is based the provider's statements to the medical scribe.  Karolyn Grewal,  7:32 AM     OBJECTIVE:   /78   Pulse 53   Temp 97.9  F (36.6  C) (Oral)   Resp 16   Ht 1.721 m (5' 7.75\")   Wt 92.1 kg (203 lb)   SpO2 94%   BMI 31.09 kg/m   Estimated body mass index is 31.09 kg/m  as calculated from the following:    Height as of this encounter: 1.721 m (5' 7.75\").    Weight as of this encounter: 92.1 kg (203 lb). "     EXAM:   GENERAL: healthy, alert and no distress  EYES: Eyes grossly normal to inspection, PERRL and conjunctivae and sclerae normal  HENT: ear canals and TM's normal, nose and mouth without ulcers or lesions  RESP: lungs clear to auscultation - no rales, rhonchi or wheezes  CV: regular rate and rhythm, normal S1 S2, no S3 or S4, no murmur, click or rub, no peripheral edema and peripheral pulses strong  ABDOMEN: soft, nontender, no hepatosplenomegaly, no masses and bowel sounds normal   (male): normal male genitalia without lesions or urethral discharge, no hernia, spermatocele right (previously identified)  MS: no gross musculoskeletal defects noted, no edema  SKIN: no suspicious lesions or rashes to visible skin  NEURO: Normal strength and tone, mentation intact and speech normal  PSYCH: mentation appears normal, affect normal/bright      ASSESSMENT / PLAN:     (Z00.00) Encounter for Medicare annual wellness exam  (primary encounter diagnosis)  Comment: Negative screening exam; up-to-date on preventive services.   Plan: follow-up in 1 year    (I70.202) Atherosclerosis of native artery of left lower extremity, with unspecified presence of clinical manifestation (H)  (I70.0) Atherosclerosis of aorta (H)  Comment: fasting, on a high-intensity statin   Plan: atorvastatin (LIPITOR) 40 MG tablet, Lipid         panel reflex to direct LDL Non-fasting, ALT,         AST        Return in about 6 months (around 4/1/2020) for cholesterol, blood pressure check.      (K76.0) NAFLD (nonalcoholic fatty liver disease)  Comment: mild  Plan: ALT, AST        Periodic monitoring    (I10) Essential hypertension with goal blood pressure less than 140/90  Comment: well controlled  Plan: lisinopril (PRINIVIL/ZESTRIL) 20 MG tablet,         Basic metabolic panel        Return in about 6 months (around 4/1/2020) for cholesterol, blood pressure check.      (R73.01) Impaired fasting glucose  Comment: fasting  Plan: Basic metabolic panel,  "Hemoglobin A1c        Return in about 6 months (around 4/1/2020) for cholesterol, blood pressure check.      (Z23) Need for prophylactic vaccination and inoculation against influenza  Comment: Influenza vaccine offered and accepted by patient. He has received it before without problems.   Plan: FLU VACCINE, INCREASED ANTIGEN, PRESV FREE, AGE        65+ [32247], ADMIN INFLUENZA VIRUS VAC              End of Life Planning:  Patient currently has an advanced directive: No.  I have verified the patient's ablity to prepare an advanced directive/make health care decisions.  Literature was provided to assist patient in preparing an advanced directive.    COUNSELING:  Reviewed preventive health counseling, as reflected in patient instructions  Special attention given to:       Regular exercise       Immunizations    Vaccinated for: Influenza          Estimated body mass index is 31.09 kg/m  as calculated from the following:    Height as of this encounter: 1.721 m (5' 7.75\").    Weight as of this encounter: 92.1 kg (203 lb).    Weight management plan: Discussed healthy diet and exercise guidelines He's been going to [employer] 4 times per week.       reports that he has never smoked. He has never used smokeless tobacco.      Appropriate preventive services were discussed with this patient, including applicable screening as appropriate for cardiovascular disease, diabetes, osteopenia/osteoporosis, and glaucoma.  As appropriate for age/gender, discussed screening for colorectal cancer, prostate cancer, breast cancer, and cervical cancer. Checklist reviewing preventive services available has been given to the patient.    Reviewed patients plan of care and provided an AVS. The Basic Care Plan (routine screening as documented in Health Maintenance) for Florencio meets the Care Plan requirement. This Care Plan has been established and reviewed with the Patient.    Counseling Resources:  ATP IV Guidelines  Pooled Cohorts Equation " Calculator  Breast Cancer Risk Calculator  FRAX Risk Assessment  ICSI Preventive Guidelines  Dietary Guidelines for Americans, 2010  USDA's MyPlate  ASA Prophylaxis  Lung CA Screening    The information in this document, created by the medical scribe for me, accurately reflects the services I personally performed and the decisions made by me. I have reviewed and approved this document for accuracy prior to leaving the patient care area.  Ant Wright MD

## 2019-10-01 NOTE — PATIENT INSTRUCTIONS
Patient Education   Personalized Prevention Plan  You are due for the preventive services outlined below.  Your care team is available to assist you in scheduling these services.  If you have already completed any of these items, please share that information with your care team to update in your medical record.  Health Maintenance Due   Topic Date Due     Flu Vaccine (1) 09/01/2019     FALL RISK ASSESSMENT  09/18/2019     Annual Wellness Visit  09/18/2019         At [facility], we strive to deliver an exceptional experience to you, every time we see you.  If you receive a survey in the mail, please send us back your thoughts. We really do value your feedback.    Based on your medical history, these are the current health maintenance/preventive care services that you are due for (some may have been done at this visit.)  Health Maintenance Due   Topic Date Due     INFLUENZA VACCINE (1) 09/01/2019     FALL RISK ASSESSMENT  09/18/2019     MEDICARE ANNUAL WELLNESS VISIT  09/18/2019         Suggested websites for health information:  Www.[employer] Orders : Up to date and easily searchable information on multiple topics.  Www.medlineplus.gov : medication info, interactive tutorials, watch real surgeries online  Www.familydoctor.org : good info from the Academy of Family Physicians  Www.cdc.gov : public health info, travel advisories, epidemics (H1N1)  Www.aap.org : children's health info, normal development, vaccinations  Www.health.state.[place].us : [place] dept of health, public health issues in [place], N1N1    Your care team:                            Family Medicine Internal Medicine   MD Ortiz Carson MD Shantel Branch-Fleming, MD Katya Georgiev PA-C Nam Ho, MD Pediatrics   MATHIEU Lainez, MD Paulette Johnson CNP, MD Deborah Mielke, MD Kim Thein, APRN CNP      Clinic hours: Monday - Thursday 7 am-7 pm; Fridays 7 am-5 pm.    Urgent care: Monday - Friday 11 am-9 pm; Saturday and Sunday 9 am-5 pm.  Pharmacy : Monday -Thursday 8 am-8 pm; Friday 8 am-6 pm; Saturday and Sunday 9 am-5 pm.     Clinic: [phone]   Pharmacy: [phone]

## 2020-02-06 DIAGNOSIS — I70.202 ATHEROSCLEROSIS OF NATIVE ARTERY OF LEFT LOWER EXTREMITY, WITH UNSPECIFIED PRESENCE OF CLINICAL MANIFESTATION (H): ICD-10-CM

## 2020-02-06 DIAGNOSIS — I70.0 ATHEROSCLEROSIS OF AORTA (H): ICD-10-CM

## 2020-02-06 NOTE — TELEPHONE ENCOUNTER
"Requested Prescriptions   Pending Prescriptions Disp Refills     atorvastatin (LIPITOR) 40 MG tablet [Pharmacy Med Name: ATORVASTATIN 40MG TABLETS] 90 tablet 1     Sig: TAKE 1 TABLET(40 MG) BY MOUTH DAILY FOR CHOLESTEROL           .Last Written Prescription Date:  10/1/19  Last Fill Quantity: 90,  # refills: 1   Last Office Visit with FM, P or [facility] prescribing provider:  10/1/19   Future Office Visit:         [facility]ins Protocol Passed - 2/6/2020 11:14 AM        Passed - LDL on file in past 12 months     Recent Labs   Lab Test 10/01/19  0759   LDL 31             Passed - No abnormal creatine kinase in past 12 months     Recent Labs   Lab Test 09/23/14  0755                   Passed - Recent (12 mo) or future (30 days) visit within the authorizing provider's specialty     Patient has had an office visit with the authorizing provider or a provider within the authorizing providers department within the previous 12 mos or has a future within next 30 days. See \"Patient Info\" tab in inbasket, or \"Choose Columns\" in Meds & Orders section of the refill encounter.              Passed - Medication is active on med list        Passed - Patient is age 18 or older              Peter Faarax  [facility]  "

## 2020-02-10 RX ORDER — ATORVASTATIN CALCIUM 40 MG/1
TABLET, FILM COATED ORAL
Qty: 90 TABLET | Refills: 0 | Status: SHIPPED | OUTPATIENT
Start: 2020-02-10 | End: 2020-05-05

## 2020-02-10 NOTE — TELEPHONE ENCOUNTER
Prescription approved per [facility] Refill Protocol.  Estephanie Chavez RN

## 2020-03-10 ENCOUNTER — NURSE TRIAGE (OUTPATIENT)
Dept: FAMILY MEDICINE | Facility: CLINIC | Age: 70
End: 2020-03-10

## 2020-03-10 NOTE — TELEPHONE ENCOUNTER
"  Additional Information    Negative: Severe difficulty breathing (e.g., struggling for each breath, speaks in single words)    Negative: Very weak (can't stand)    Negative: Sounds like a life-threatening emergency to the triager    Negative: Cough is the main symptom    Negative: Sore throat is the main symptom    Runny nose is caused by pollen or other allergies    Negative: Wheezing (high pitched whistling sound) and previous asthma attacks or use of asthma medicines    Negative: Doesn't match the SYMPTOMS for nasal allergy    Negative: Patient sounds very sick or weak to the triager    Negative: Lots of coughing    Negative: Lots of yellow or green discharge from nose, present > 3 days    Negative: Nasal discharge present > 10 days    Negative: MODERATE-SEVERE nasal allergy symptoms (i.e., interfere with sleep, school, or work) and taking antihistamines > 2 days    Negative: Patient wants to be seen    Negative: Nasal allergies occur year-round    Negative: Snores most nights of month    Nasal allergies occur only certain times of year    Negative: Nasal allergies occur only certain times of year and diagnosis of hay fever has never been confirmed by a physician    Answer Assessment - Initial Assessment Questions  1. ONSET: \"When did the nasal discharge start?\"       Started last Wednesday   2. AMOUNT: \"How much discharge is there?\"       Moderate   3. COUGH: \"Do you have a cough?\" If yes, ask: \"Describe the color of your sputum\" (clear, white, yellow, green)      Non-productive occasional cough  4. RESPIRATORY DISTRESS: \"Describe your breathing.\"       No problems, he feels good  5. FEVER: \"Do you have a fever?\" If so, ask: \"What is your temperature, how was it measured, and when did it start?\"      No fever   6. SEVERITY: \"Overall, how bad are you feeling right now?\" (e.g., doesn't interfere with normal activities, staying home from school/work, staying in bed)       Mild   7. OTHER SYMPTOMS: \"Do you have any " "other symptoms?\" (e.g., sore throat, earache, wheezing, vomiting)      Mild wheezing at night  8. PREGNANCY: \"Is there any chance you are pregnant?\" \"When was your last menstrual period?\"      N/A    Answer Assessment - Initial Assessment Questions  1. SYMPTOM: \"What's the main symptom you're concerned about?\" (e.g., runny nose, stuffiness, sneezing, itching)      Runny nose  2. SEVERITY: \"How bad is it?\" \"What does it keep you from doing?\" (e.g., sleeping, working)       moderate  3. EYES: \"Are the eyes also red, watery, and itchy?\"       No   4. TRIGGER: \"What pollen or other allergic substance do you think is causing the symptoms?\"       Seasonal   5. TREATMENT: \"What medicine are you using?\" \"What medicine worked best in the past?\"      Jennifer seltzer Plus and Cetrizine 10mg x 3 days which is helping  6. OTHER SYMPTOMS: \"Do you have any other symptoms?\" (e.g., coughing, difficulty breathing, wheezing)      Not really just persistent runny nose  7. PREGNANCY: \"Is there any chance you are pregnant?\" \"When was your last menstrual period?\"      N/A    Protocols used: COMMON COLD-A-OH, HAY FEVER - NASAL ALLERGIES-A-OH    "

## 2020-03-10 NOTE — TELEPHONE ENCOUNTER
Reason for Call:  Other call back    Detailed comments: Pt states that he has had a prolonged cold and has been blowing his nose quite a bit. States that the mucus is clear when he blows his nose. He would like a call back to advise. Thank you.    Phone Number Patient can be reached at: Cell number on file:    Telephone Information:   Mobile [phone]       Best Time: Any    Can we leave a detailed message on this number? States he will answer    Call taken on 3/10/2020 at 10:16 AM by Shaina Norris

## 2020-04-06 DIAGNOSIS — I10 ESSENTIAL HYPERTENSION WITH GOAL BLOOD PRESSURE LESS THAN 140/90: ICD-10-CM

## 2020-04-06 RX ORDER — LISINOPRIL 20 MG/1
TABLET ORAL
Qty: 90 TABLET | Refills: 0 | Status: CANCELLED | OUTPATIENT
Start: 2020-04-06

## 2020-04-06 NOTE — TELEPHONE ENCOUNTER
"Requested Prescriptions   Pending Prescriptions Disp Refills     lisinopril (ZESTRIL) 20 MG tablet [Pharmacy Med Name: LISINOPRIL 20MG TABLETS]  Last Written Prescription Date:  10/01/19  Last Fill Quantity: 90,  # refills: 0  Last Office Visit with FMREYES, MARC or [facility] prescribing provider:  10/01/19Oly   Future Office Visit:    90 tablet 0     Sig: TAKE ONE TABLET BY MOUTH ONCE DAILY FOR BLOOD PRESSURE       ACE Inhibitors (Including Combos) Protocol Passed - 4/6/2020 10:53 AM        Passed - Blood pressure under 140/90 in past 12 months     BP Readings from Last 3 Encounters:   10/01/19 134/78   08/01/19 133/76   06/05/19 122/75                 Passed - Recent (12 mo) or future (30 days) visit within the authorizing provider's specialty     Patient has had an office visit with the authorizing provider or a provider within the authorizing providers department within the previous 12 mos or has a future within next 30 days. See \"Patient Info\" tab in inbasket, or \"Choose Columns\" in Meds & Orders section of the refill encounter.              Passed - Medication is active on med list        Passed - Patient is age 18 or older        Passed - Normal serum creatinine on file in past 12 months     Recent Labs   Lab Test 10/01/19  0759   CR 0.94       Ok to refill medication if creatinine is low          Passed - Normal serum potassium on file in past 12 months     Recent Labs   Lab Test 10/01/19  0759   POTASSIUM 4.3                  "

## 2020-04-09 RX ORDER — LISINOPRIL 20 MG/1
20 TABLET ORAL DAILY
Qty: 90 TABLET | Refills: 0 | Status: SHIPPED | OUTPATIENT
Start: 2020-04-09 | End: 2020-07-03

## 2020-05-01 DIAGNOSIS — I70.0 ATHEROSCLEROSIS OF AORTA (H): ICD-10-CM

## 2020-05-01 DIAGNOSIS — I70.202 ATHEROSCLEROSIS OF NATIVE ARTERY OF LEFT LOWER EXTREMITY, WITH UNSPECIFIED PRESENCE OF CLINICAL MANIFESTATION (H): ICD-10-CM

## 2020-05-05 RX ORDER — ATORVASTATIN CALCIUM 40 MG/1
TABLET, FILM COATED ORAL
Qty: 90 TABLET | Refills: 0 | Status: SHIPPED | OUTPATIENT
Start: 2020-05-05 | End: 2020-07-31

## 2020-05-05 NOTE — TELEPHONE ENCOUNTER
Prescription approved per [facility] Refill Protocol.  Radha Sanford RN  [facility]

## 2020-07-02 DIAGNOSIS — I10 ESSENTIAL HYPERTENSION WITH GOAL BLOOD PRESSURE LESS THAN 140/90: ICD-10-CM

## 2020-07-03 RX ORDER — LISINOPRIL 20 MG/1
TABLET ORAL
Qty: 90 TABLET | Refills: 0 | Status: SHIPPED | OUTPATIENT
Start: 2020-07-03 | End: 2020-09-03

## 2020-07-03 NOTE — TELEPHONE ENCOUNTER
"Requested Prescriptions   Pending Prescriptions Disp Refills     lisinopril (ZESTRIL) 20 MG tablet [Pharmacy Med Name: LISINOPRIL 20MG TABLETS] 90 tablet 0     Sig: TAKE 1 TABLET(20 MG) BY MOUTH DAILY FOR BLOOD PRESSURE       ACE Inhibitors (Including Combos) Protocol Passed - 7/3/2020  2:18 PM        Passed - Blood pressure under 140/90 in past 12 months     BP Readings from Last 3 Encounters:   10/01/19 134/78   08/01/19 133/76   06/05/19 122/75                 Passed - Recent (12 mo) or future (30 days) visit within the authorizing provider's specialty     Patient has had an office visit with the authorizing provider or a provider within the authorizing providers department within the previous 12 mos or has a future within next 30 days. See \"Patient Info\" tab in inbasket, or \"Choose Columns\" in Meds & Orders section of the refill encounter.              Passed - Medication is active on med list        Passed - Patient is age 18 or older        Passed - Normal serum creatinine on file in past 12 months     Recent Labs   Lab Test 10/01/19  0759   CR 0.94       Ok to refill medication if creatinine is low          Passed - Normal serum potassium on file in past 12 months     Recent Labs   Lab Test 10/01/19  0759   POTASSIUM 4.3                Prescription approved per [facility] Refill Protocol.      Ed Gupta RN, BSN, PHN    "

## 2020-07-27 DIAGNOSIS — I70.202 ATHEROSCLEROSIS OF NATIVE ARTERY OF LEFT LOWER EXTREMITY, WITH UNSPECIFIED PRESENCE OF CLINICAL MANIFESTATION (H): ICD-10-CM

## 2020-07-27 DIAGNOSIS — I70.0 ATHEROSCLEROSIS OF AORTA (H): ICD-10-CM

## 2020-07-27 DIAGNOSIS — L23.7 ALLERGIC CONTACT DERMATITIS DUE TO PLANTS, EXCEPT FOOD: ICD-10-CM

## 2020-07-31 RX ORDER — ATORVASTATIN CALCIUM 40 MG/1
TABLET, FILM COATED ORAL
Qty: 90 TABLET | Refills: 0 | Status: SHIPPED | OUTPATIENT
Start: 2020-07-31 | End: 2020-09-03

## 2020-07-31 NOTE — TELEPHONE ENCOUNTER
"For triamcinolone:  Routing refill request to provider for review/approval because:  Protocol failed      For atorvastatin:  Medication is being filled for 1 time refill only due to:  Patient needs to be seen because due for visit in 10/20.      Requested Prescriptions   Pending Prescriptions Disp Refills     atorvastatin (LIPITOR) 40 MG tablet [Pharmacy Med Name: ATORVASTATIN 40MG TABLETS] 90 tablet 0     Sig: TAKE 1 TABLET(40 MG) BY MOUTH DAILY FOR CHOLESTEROL       Statins Protocol Passed - 7/31/2020  8:41 AM        Passed - LDL on file in past 12 months     Recent Labs   Lab Test 10/01/19  0759   LDL 31             Passed - No abnormal creatine kinase in past 12 months     Recent Labs   Lab Test 09/23/14  0755                   Passed - Recent (12 mo) or future (30 days) visit within the authorizing provider's specialty     Patient has had an office visit with the authorizing provider or a provider within the authorizing providers department within the previous 12 mos or has a future within next 30 days. See \"Patient Info\" tab in inbasket, or \"Choose Columns\" in Meds & Orders section of the refill encounter.              Passed - Medication is active on med list        Passed - Patient is age 18 or older           triamcinolone (ARISTOCORT HP) 0.5 % external cream [Pharmacy Med Name: TRIAMCINOLONE 0.5% CREAM 15GM] 30 g      Sig: APPLY SPARINGLY TO AFFECTED AREA(FOREARM RASH) THREE TIMES A DAY       Topical Steroids and Nonsteroidals Protocol Failed - 7/27/2020  3:22 PM        Failed - High potency steroid not ordered        Passed - Patient is age 6 or older        Passed - Authorizing prescriber's most recent note related to this medication read.     If refill request is for ophthalmic use, please forward request to provider for approval.          Passed - Recent (12 mo) or future (30 days) visit within the authorizing provider's specialty     Patient has had an office visit with the authorizing provider " "or a provider within the authorizing providers department within the previous 12 mos or has a future within next 30 days. See \"Patient Info\" tab in inbasket, or \"Choose Columns\" in Meds & Orders section of the refill encounter.              Passed - Medication is active on med list             Ed Gupta RN, BSN, PHN    "

## 2020-08-01 RX ORDER — TRIAMCINOLONE ACETONIDE 5 MG/G
CREAM TOPICAL
Qty: 30 G | Refills: 1 | Status: SHIPPED | OUTPATIENT
Start: 2020-08-01 | End: 2022-09-12

## 2020-09-03 ENCOUNTER — OFFICE VISIT (OUTPATIENT)
Dept: FAMILY MEDICINE | Facility: CLINIC | Age: 70
End: 2020-09-03
Payer: MEDICARE

## 2020-09-03 VITALS
RESPIRATION RATE: 18 BRPM | SYSTOLIC BLOOD PRESSURE: 125 MMHG | OXYGEN SATURATION: 98 % | TEMPERATURE: 96.4 F | HEIGHT: 67 IN | WEIGHT: 201.4 LBS | BODY MASS INDEX: 31.61 KG/M2 | HEART RATE: 58 BPM | DIASTOLIC BLOOD PRESSURE: 77 MMHG

## 2020-09-03 DIAGNOSIS — I70.0 ATHEROSCLEROSIS OF AORTA (H): ICD-10-CM

## 2020-09-03 DIAGNOSIS — I70.202 ATHEROSCLEROSIS OF NATIVE ARTERY OF LEFT LOWER EXTREMITY, WITH UNSPECIFIED PRESENCE OF CLINICAL MANIFESTATION (H): ICD-10-CM

## 2020-09-03 DIAGNOSIS — I10 ESSENTIAL HYPERTENSION WITH GOAL BLOOD PRESSURE LESS THAN 140/90: ICD-10-CM

## 2020-09-03 DIAGNOSIS — M25.531 PAIN IN BOTH WRISTS: ICD-10-CM

## 2020-09-03 DIAGNOSIS — Z23 NEED FOR INFLUENZA VACCINATION: ICD-10-CM

## 2020-09-03 DIAGNOSIS — K76.0 NAFLD (NONALCOHOLIC FATTY LIVER DISEASE): ICD-10-CM

## 2020-09-03 DIAGNOSIS — M25.532 PAIN IN BOTH WRISTS: ICD-10-CM

## 2020-09-03 DIAGNOSIS — Z00.00 ENCOUNTER FOR MEDICARE ANNUAL WELLNESS EXAM: Primary | ICD-10-CM

## 2020-09-03 DIAGNOSIS — R73.01 IMPAIRED FASTING GLUCOSE: ICD-10-CM

## 2020-09-03 DIAGNOSIS — N52.9 ERECTILE DYSFUNCTION, UNSPECIFIED ERECTILE DYSFUNCTION TYPE: ICD-10-CM

## 2020-09-03 LAB
ALBUMIN SERPL-MCNC: 3.9 G/DL (ref 3.4–5)
ALP SERPL-CCNC: 94 U/L (ref 40–150)
ALT SERPL W P-5'-P-CCNC: 63 U/L (ref 0–70)
ANION GAP SERPL CALCULATED.3IONS-SCNC: 8 MMOL/L (ref 3–14)
AST SERPL W P-5'-P-CCNC: 34 U/L (ref 0–45)
BILIRUB DIRECT SERPL-MCNC: 0.2 MG/DL (ref 0–0.2)
BILIRUB SERPL-MCNC: 0.6 MG/DL (ref 0.2–1.3)
BUN SERPL-MCNC: 17 MG/DL (ref 7–30)
CALCIUM SERPL-MCNC: 8.6 MG/DL (ref 8.5–10.1)
CHLORIDE SERPL-SCNC: 109 MMOL/L (ref 94–109)
CHOLEST SERPL-MCNC: 112 MG/DL
CO2 SERPL-SCNC: 25 MMOL/L (ref 20–32)
CREAT SERPL-MCNC: 0.96 MG/DL (ref 0.66–1.25)
GFR SERPL CREATININE-BSD FRML MDRD: 80 ML/MIN/{1.73_M2}
GLUCOSE SERPL-MCNC: 102 MG/DL (ref 70–99)
HBA1C MFR BLD: 5.9 % (ref 0–5.6)
HDLC SERPL-MCNC: 56 MG/DL
LDLC SERPL CALC-MCNC: 37 MG/DL
NONHDLC SERPL-MCNC: 56 MG/DL
POTASSIUM SERPL-SCNC: 4.2 MMOL/L (ref 3.4–5.3)
PROT SERPL-MCNC: 7.7 G/DL (ref 6.8–8.8)
SODIUM SERPL-SCNC: 142 MMOL/L (ref 133–144)
TRIGL SERPL-MCNC: 95 MG/DL

## 2020-09-03 PROCEDURE — 99214 OFFICE O/P EST MOD 30 MIN: CPT | Mod: 25 | Performed by: FAMILY MEDICINE

## 2020-09-03 PROCEDURE — 80076 HEPATIC FUNCTION PANEL: CPT | Performed by: FAMILY MEDICINE

## 2020-09-03 PROCEDURE — 90662 IIV NO PRSV INCREASED AG IM: CPT | Performed by: FAMILY MEDICINE

## 2020-09-03 PROCEDURE — 83036 HEMOGLOBIN GLYCOSYLATED A1C: CPT | Performed by: FAMILY MEDICINE

## 2020-09-03 PROCEDURE — 80048 BASIC METABOLIC PNL TOTAL CA: CPT | Performed by: FAMILY MEDICINE

## 2020-09-03 PROCEDURE — G0008 ADMIN INFLUENZA VIRUS VAC: HCPCS | Performed by: FAMILY MEDICINE

## 2020-09-03 PROCEDURE — 36415 COLL VENOUS BLD VENIPUNCTURE: CPT | Performed by: FAMILY MEDICINE

## 2020-09-03 PROCEDURE — G0439 PPPS, SUBSEQ VISIT: HCPCS | Performed by: FAMILY MEDICINE

## 2020-09-03 PROCEDURE — 80061 LIPID PANEL: CPT | Performed by: FAMILY MEDICINE

## 2020-09-03 RX ORDER — LISINOPRIL 20 MG/1
20 TABLET ORAL DAILY
Qty: 90 TABLET | Refills: 1 | Status: SHIPPED | OUTPATIENT
Start: 2020-09-03 | End: 2021-03-26

## 2020-09-03 RX ORDER — SILDENAFIL CITRATE 20 MG/1
TABLET ORAL
Qty: 50 TABLET | Refills: 3 | Status: SHIPPED | OUTPATIENT
Start: 2020-09-03 | End: 2021-09-10

## 2020-09-03 RX ORDER — ETODOLAC 400 MG
400 TABLET ORAL 2 TIMES DAILY WITH MEALS
Qty: 60 TABLET | Refills: 5 | Status: SHIPPED | OUTPATIENT
Start: 2020-09-03 | End: 2021-09-10

## 2020-09-03 RX ORDER — ATORVASTATIN CALCIUM 40 MG/1
40 TABLET, FILM COATED ORAL DAILY
Qty: 90 TABLET | Refills: 1 | Status: SHIPPED | OUTPATIENT
Start: 2020-09-03 | End: 2021-03-26

## 2020-09-03 ASSESSMENT — PAIN SCALES - GENERAL: PAINLEVEL: NO PAIN (0)

## 2020-09-03 ASSESSMENT — MIFFLIN-ST. JEOR: SCORE: 1637.17

## 2020-09-03 NOTE — PATIENT INSTRUCTIONS
Patient Education   Personalized Prevention Plan  You are due for the preventive services outlined below.  Your care team is available to assist you in scheduling these services.  If you have already completed any of these items, please share that information with your care team to update in your medical record.  Health Maintenance Due   Topic Date Due     PHQ-2  01/01/2020     Flu Vaccine (1) 09/01/2020     Annual Wellness Visit  10/01/2020     Cholesterol Lab  10/01/2020     FALL RISK ASSESSMENT  10/01/2020       At [facility], we strive to deliver an exceptional experience to you, every time we see you. If you receive a survey, please complete it as we do value your feedback.  If you have MyChart, you can expect to receive results automatically within 24 hours of their completion.  Your provider will send a note interpreting your results as well.   If you do not have MyChart, you should receive your results in about a week by mail.    Your care team:                            Family Medicine Internal Medicine   MD Ortiz Carson MD Shantel Branch-Fleming, MD Srinivasa Vaka, MD Katya Georgiev PA-TAVO Francis, APRN CNP    Elijah Stark MD Pediatrics   Blaine Le, PA-TAVO Quintanilla, CNP MD Katy Blair APRN CNP   MD Paulette Ang MD Deborah Mielke, MD Kim Thein, APRN CNP  Jayna Mccullough, PA-TAVO Bolaños, CNP  MD Rachel Michaels MD Angela Wermerskirchen, MD      Clinic hours: Monday - Thursday 7 am-7 pm; Fridays 7 am-5 pm.   Urgent care: Monday - Friday 11 am-9 pm; Saturday and Sunday 9 am-5 pm.    Clinic: [phone]       [facility]: Monday - Thursday 8 am - 7 pm; Friday 8 am - 6 pm  [facility]: [phone]     Use [email or web address] for 24/7 diagnosis and treatment of dozens of conditions.

## 2020-09-03 NOTE — PROGRESS NOTES
"  SUBJECTIVE:   Florencio Doshi is a 70 year old male who presents for Preventive Visit.  Are you in the first 12 months of your Medicare Part B coverage?  No    Physical Health:    In general, how would you rate your overall physical health? good    Outside of work, how many days during the week do you exercise? 2-3 days/week    Outside of work, approximately how many minutes a day do you exercise?45-60 minutes    If you drink alcohol do you typically have >3 drinks per day or >7 drinks per week? No    Do you usually eat at least 4 servings of fruit and vegetables a day, include whole grains & fiber and avoid regularly eating high fat or \"junk\" foods? NO    Do you have any problems taking medications regularly?  No    Do you have any side effects from medications? none    Needs assistance for the following daily activities: no assistance needed    Which of the following safety concerns are present in your home?  none identified     Hearing impairment: No    In the past 6 months, have you been bothered by leaking of urine? no    Mental Health:    In general, how would you rate your overall mental or emotional health? good  PHQ-2 Score:  0    Do you feel safe in your environment? Yes    Have you ever done Advance Care Planning? (For example, a Health Directive, POLST, or a discussion with a medical provider or your loved ones about your wishes): No, advance care planning information given to patient to review.  Advanced care planning was discussed at today's visit.    Additional concerns to address?  YES- wrists, see ROS for details     Fall risk:  Fallen 2 or more times in the past year?: No  Any fall with injury in the past year?: No  click delete button to remove this line now  Cognitive Screenin) Repeat 3 items (Leader, Season, Table)    2) Clock draw: NORMAL  3) 3 item recall: Recalls 3 objects  Results: 3 items recalled: COGNITIVE IMPAIRMENT LESS LIKELY    Mini-CogTM Copyright S Merrick. Licensed by the " author for use in [facility]; reprinted with permission ([email or web address]). All rights reserved.      Do you have sleep apnea, excessive snoring or daytime drowsiness?: no        Hyperlipidemia Follow-Up      Are you regularly taking any medication or supplement to lower your cholesterol?   Yes- atorvastatin    Are you having muscle aches or other side effects that you think could be caused by your cholesterol lowering medication?  No    Hypertension Follow-up      Do you check your blood pressure regularly outside of the clinic? No     Are you following a low salt diet? Yes    Are your blood pressures ever more than 140 on the top number (systolic) OR more   than 90 on the bottom number (diastolic), for example 140/90? No    Past medical, family, and social histories, medications, and allergies are reviewed and updated in Epic.     Social History     Tobacco Use     Smoking status: Never Smoker     Smokeless tobacco: Never Used   Substance Use Topics     Alcohol use: Yes     Comment: occassionally                           Current providers sharing in care for this patient include:   Patient Care Team:  Ant Wright MD as PCP - General  Ant Wright MD as Assigned PCP  Ant Wright MD as MD (Family Practice)  Yuni Robbins MD as MD (Internal Medicine)    The following health maintenance items are reviewed in Epic and correct as of today:  Health Maintenance   Topic Date Due     INFLUENZA VACCINE (1) 09/01/2020     LIPID  10/01/2020     FALL RISK ASSESSMENT  10/01/2020     MEDICARE ANNUAL WELLNESS VISIT  09/03/2021     ADVANCE CARE PLANNING  10/01/2024     COLORECTAL CANCER SCREENING  10/19/2027     DTAP/TDAP/TD IMMUNIZATION (4 - Td) 06/03/2029     HEPATITIS C SCREENING  Completed     PHQ-2  Completed     PNEUMOCOCCAL IMMUNIZATION 65+ LOW/MEDIUM RISK  Completed     ZOSTER IMMUNIZATION  Completed     AORTIC ANEURYSM SCREENING (SYSTEM ASSIGNED)  Completed     IPV IMMUNIZATION  Aged Out  "    MENINGITIS IMMUNIZATION  Aged Out     HEPATITIS B IMMUNIZATION  Aged Out         ROS:  Constitutional, HEENT, cardiovascular, pulmonary, GI, , neuro, skin, endocrine and psych systems are negative, except as otherwise noted.  Bilateral (R>L) wrist ache for 3-4 years, worse lately with more activities (lifting things, fishing, etc.)    OBJECTIVE:   /77 (BP Location: Right arm, Patient Position: Sitting, Cuff Size: Adult Large)   Pulse 58   Temp 96.4  F (35.8  C) (Tympanic)   Resp 18   Ht 1.702 m (5' 7\")   Wt 91.4 kg (201 lb 6.4 oz)   SpO2 98%   BMI 31.54 kg/m   Estimated body mass index is 31.54 kg/m  as calculated from the following:    Height as of this encounter: 1.702 m (5' 7\").    Weight as of this encounter: 91.4 kg (201 lb 6.4 oz).  EXAM:   GENERAL: healthy, alert and no distress  EYES: Eyes grossly normal to inspection, PERRL and conjunctivae and sclerae normal  HENT: ear canals and TM's normal, nose and mouth without ulcers or lesions  NECK: no adenopathy, no asymmetry, masses, or scars and thyroid normal to palpation  RESP: lungs clear to auscultation - no rales, rhonchi or wheezes  CV: regular rate and rhythm, normal S1 S2, no S3 or S4, no murmur, click or rub, no peripheral edema and peripheral pulses strong  ABDOMEN: soft, nontender, no hepatosplenomegaly, no masses and bowel sounds normal   (male): RT spermatocele, otherwise normal male genitalia without lesions or urethral discharge, no hernia  MS: no gross musculoskeletal defects noted, no edema  SKIN: no suspicious lesions or rashes  NEURO: Normal strength and tone, mentation intact and speech normal  PSYCH: mentation appears normal, affect normal/bright      ASSESSMENT / PLAN:   (Z00.00) Encounter for Medicare annual wellness exam  (primary encounter diagnosis)  Comment: Negative screening exam; up-to-date on preventive services.   Plan: HC FLU VACCINE, INCREASED ANTIGEN, PRESV FREE         [12732]        F/u 1 year    (I70.0) " "Atherosclerosis of aorta (H)  (I70.202) Atherosclerosis of native artery of left lower extremity, with unspecified presence of clinical manifestation (H)  Comment: on a high-intensity statin   Plan: Lipid panel reflex to direct LDL Non-fasting,         Hepatic panel, atorvastatin (LIPITOR) 40 MG         tablet        Return in about 6 months (around 3/3/2021) for cholesterol, blood pressure check.      (K76.0) NAFLD (nonalcoholic fatty liver disease)  Comment: His most recent LFTs were normal  Plan: Hepatic panel        Monitor periodically    (I10) Essential hypertension with goal blood pressure less than 140/90  Comment: Well-controlled  Plan: Basic metabolic panel, lisinopril (ZESTRIL) 20         MG tablet         Return in about 6 months (around 3/3/2021) for cholesterol, blood pressure check.      (R73.01) Impaired fasting glucose  Comment: Mild  Plan: Basic metabolic panel, Hemoglobin A1c        Monitor periodically    (N52.9) Erectile dysfunction, unspecified erectile dysfunction type  Comment: Refill request  Plan: sildenafil (REVATIO) 20 MG tablet          (Z23) Need for influenza vaccination  Comment: Influenza vaccine offered and accepted by patient. He has received it before without problems.   Plan: HC FLU VACCINE, INCREASED ANTIGEN, PRESV FREE         [64232], ADMIN INFLUENZA VIRUS VAC (MEDICARE)            (M25.531,  M25.532) Pain in both wrists  Comment: used diclofenac a few years ago which helped  Plan: etodolac (LODINE) 400 MG tablet        Can take  1 daily and add a second one on worse days      COUNSELING:  Reviewed preventive health counseling, as reflected in patient instructions  Special attention given to:       Regular exercise       Immunizations    Vaccinated for: Influenza             Advanced directives    Estimated body mass index is 31.54 kg/m  as calculated from the following:    Height as of this encounter: 1.702 m (5' 7\").    Weight as of this encounter: 91.4 kg (201 lb 6.4 " oz).    Weight management plan: Discussed healthy diet and exercise guidelines . Currently bikes very frequently, 4-5 days per week 7-8 miles.  Used to do treadmill and weights until [employer] shut down    He reports that he has never smoked. He has never used smokeless tobacco.    Appropriate preventive services were discussed with this patient, including applicable screening as appropriate for cardiovascular disease, diabetes, osteopenia/osteoporosis, and glaucoma.  As appropriate for age/gender, discussed screening for colorectal cancer, prostate cancer, breast cancer, and cervical cancer. Checklist reviewing preventive services available has been given to the patient.    Reviewed patients plan of care and provided an AVS. The Basic Care Plan (routine screening as documented in Health Maintenance) for Florencio meets the Care Plan requirement. This Care Plan has been established and reviewed with the Patient.    Counseling Resources:  ATP IV Guidelines  Pooled Cohorts Equation Calculator  Breast Cancer Risk Calculator  BRCA-Related Cancer Risk Assessment: FHS-7 Tool  FRAX Risk Assessment  ICSI Preventive Guidelines  Dietary Guidelines for Americans, 2010  USDA's MyPlate  ASA Prophylaxis  Lung CA Screening    Ant Wright MD  [facility]

## 2020-10-19 ENCOUNTER — OFFICE VISIT (OUTPATIENT)
Dept: FAMILY MEDICINE | Facility: CLINIC | Age: 70
End: 2020-10-19
Payer: MEDICARE

## 2020-10-19 VITALS
DIASTOLIC BLOOD PRESSURE: 82 MMHG | TEMPERATURE: 97.8 F | SYSTOLIC BLOOD PRESSURE: 121 MMHG | HEIGHT: 67 IN | HEART RATE: 89 BPM | WEIGHT: 201.2 LBS | OXYGEN SATURATION: 95 % | BODY MASS INDEX: 31.58 KG/M2

## 2020-10-19 DIAGNOSIS — B07.9 VERRUCA VULGARIS: Primary | ICD-10-CM

## 2020-10-19 PROCEDURE — 99213 OFFICE O/P EST LOW 20 MIN: CPT | Performed by: NURSE PRACTITIONER

## 2020-10-19 ASSESSMENT — MIFFLIN-ST. JEOR: SCORE: 1636.27

## 2020-10-19 ASSESSMENT — PAIN SCALES - GENERAL: PAINLEVEL: NO PAIN (0)

## 2020-10-19 NOTE — PROGRESS NOTES
"Subjective     Florencio Doshi is a 70 year old male who presents to clinic today for the following health issues:    HPI         Rash  Onset/Duration: had had for years in last couple months has gotten larger, more rough  Description  Location: left chest area  Character: round, raised  Itching: no  Intensity:  mild  Progression of Symptoms:  worsening  Accompanying signs and symptoms:   Fever: no  Body aches or joint pain: no  Sore throat symptoms: no  Recent cold symptoms: no  History:           Previous episodes of similar rash: yes in leg   New exposures:  None  Recent travel: no  Exposure to similar rash: no  Precipitating or alleviating factors: none   Therapies tried and outcome: Vaseline- no improvement       Review of Systems   Constitutional, HEENT, cardiovascular, pulmonary, gi and gu systems are negative, except as otherwise noted.      Objective    /82 (BP Location: Left arm, Patient Position: Chair, Cuff Size: Adult Regular)   Pulse 89   Temp 97.8  F (36.6  C) (Tympanic)   Ht 1.702 m (5' 7\")   Wt 91.3 kg (201 lb 3.2 oz)   SpO2 95%   BMI 31.51 kg/m    Body mass index is 31.51 kg/m .  Physical Exam   GENERAL: healthy, alert and no distress  EYES: Eyes grossly normal to inspection, PERRL and conjunctivae and sclerae normal  HENT: ear canals and TM's normal, nose and mouth without ulcers or lesions  NECK: no adenopathy, no asymmetry, masses, or scars and thyroid normal to palpation  RESP: lungs clear to auscultation - no rales, rhonchi or wheezes  CV: regular rate and rhythm, normal S1 S2, no S3 or S4, no murmur, click or rub, no peripheral edema and peripheral pulses strong  ABDOMEN: soft, nontender, no hepatosplenomegaly, no masses and bowel sounds normal  MS: no gross musculoskeletal defects noted, no edema  SKIN: 76mm varregated, brown, verruccus lesion medial to left nipple at 0900 axis, no surrounding erythema, no scaling or discharge, otherwise, no suspicious lesions or rashes  NEURO: " Normal strength and tone, mentation intact and speech normal  BACK: no CVA tenderness, no paralumbar tenderness  PSYCH: mentation appears normal, affect normal/bright  LYMPH: normal ant/post cervical, supraclavicular nodes          Assessment & Plan     Verruca vulgaris  Referring to Derm for excision/possible biopsy. He is due for skin check which he has annually, needs new referral.  - DERMATOLOGY ADULT REFERRAL          See Patient Instructions    No follow-ups on file.    PRISCILLA Forman CNP  [facility]

## 2020-10-19 NOTE — PATIENT INSTRUCTIONS
At [facility], we strive to deliver an exceptional experience to you, every time we see you. If you receive a survey, please complete it as we do value your feedback.  If you have MyChart, you can expect to receive results automatically within 24 hours of their completion.  Your provider will send a note interpreting your results as well.   If you do not have MyChart, you should receive your results in about a week by mail.    Your care team:                            Family Medicine Internal Medicine   MD Ortiz Carson, MD Talya Farmer, MD Tanika Diane PA-C, APRN CNP    Elijah Stark, MD Pediatrics   Blaine Le, PAEnioC  Stacy Quintanilla, CNP Celia Nance, MD Katy Blake APRN CNP   MD Paulette Ang MD Deborah Mielke, MD Gayatri Waller, APRN CNP  Jayna Mccullough, PAEnioC  Chapis Bolaños, CNP  MD Rachel Michaels MD Angela Wermerskirchen, MD      Clinic hours: Monday - Thursday 7 am-7 pm; Fridays 7 am-5 pm.   Urgent care: Monday - Friday 11 am-9 pm; Saturday and Sunday 9 am-5 pm.    Clinic: [phone]       [facility]: Monday - Thursday 8 am - 7 pm; Friday 8 am - 6 pm  [facility]: [phone]     Use www.[email or web address] for 24/7 diagnosis and treatment of dozens of conditions.  Patient Education     Warts (Nongenital)  Warts are caused by a skin virus. They usually appear on the hands or feet. They are harmless and usually go away in about 2 to 3 years without treatment. With treatment, they go away in 1 to 3 months.  Home care  There are several methods you can use to treat warts at home.  The overnight treatment:  1. Soak affected area in hot water for 3 to 5 minutes. Make sure to test water beforehand so that it is not scalding. You should be able to comfortably place the affected area in water.  2. Trim dead tissue with a  pumice stone, emery board, or other tool your healthcare provider has advised, or that you feel comfortable using.  3. Apply an over-the-counter medicine that has salicylic acid. Cover the wart with an adhesive tape.  4. Repeat every third night as tolerated until the wart goes away.  The duct tape method:    Apply a small piece of duct tape to the wart for 6 days. The tape should cover the entire wart. At the end of the sixth day, remove the tape and soak in warm water. Then scrub the area gently with a pumice stone or emery board. Let the wart stay open to air overnight. Then reapply the duct tape the next morning and follow the same routine. This can be repeated for up to 2 months.  Warts on the hands or feet are not very contagious. This means they are not spread to others by ordinary contact. But chewing or picking at the wart can cause it to spread to other places on your own skin.  Follow-up care  Follow up with your healthcare provider, or as advised. Let your provider know if the wart does not go away after 2 months of the above treatment.  When to seek medical advice  Get medical care right away if any of the following occur:    A wart appears on the bottom of the foot or on the genitals.    There are signs of infection such as fever, redness, swelling, increased pain, or pus or the wart itches, burns, or bleeds.  Date Last Reviewed: 8/1/2016 2000-2019 [employer]. [address] [place] [address]. All rights reserved. This information is not intended as a substitute for professional medical care. Always follow your healthcare professional's instructions.           Patient Education     What Are Warts?    Warts are common skin growths that can appear anywhere on the body. There are many types of warts. In most cases, they are benign (not cancer) and harmless. But warts can be embarrassing. And some warts are painful. The good news is that they can be treated.  Who gets  warts?  Warts are most common in children and teens. But they can occur at any age. They are also more common in certain jobs, such as those that involve handling meat, poultry, or fish. A weakened immune system may make you more likely to have warts.  What causes warts?  Warts are caused by the human papillomavirus (HPV). There are over 150 types of HPV. This virus can spread between people. But you can be exposed to the virus and not get warts. Warts tend to form where skin is damaged or broken. But they can also appear elsewhere. Left untreated, warts can grow in number. They can also spread to other parts of the body.  Types of warts  There are many types of warts. Some of the most common ones are described below:    Common warts. These have a raised, rough surface. Enlarged blood vessels in the warts look like dots on the warts  surface. Common warts form mainly on the hands, but can appear on other parts of the body.    Plantar warts. These are warts on the soles of the feet. When you stand or walk, pressure makes plantar warts painful. When they form in clusters, plantar warts are called mosaic warts.    Periungual warts. These form under and around fingernails. People who bite their nails are more at risk.    Filiform warts. These are slender, fingerlike growths that can dangle from the skin. They most often appear on the face and neck.    Flat warts. These are small, smooth growths. They tend to form in clusters on the face, backs of the hands, or legs.    Genital warts. These can appear on or around the genitals. These warts can spread and are linked to cervical, anal, and other cancers. So it is important to have them treated quickly and to discuss these with sexual partners.  Date Last Reviewed: 2/1/2017 2000-2019 [employer] [address] [facility], [place] [address]. All rights reserved. This information is not intended as a substitute for professional medical care. Always follow your  healthcare professional's instructions.

## 2020-11-24 ENCOUNTER — OFFICE VISIT (OUTPATIENT)
Dept: DERMATOLOGY | Facility: CLINIC | Age: 70
End: 2020-11-24
Attending: NURSE PRACTITIONER
Payer: MEDICARE

## 2020-11-24 DIAGNOSIS — L91.8 INFLAMED SKIN TAG: ICD-10-CM

## 2020-11-24 DIAGNOSIS — L81.4 SOLAR LENTIGO: ICD-10-CM

## 2020-11-24 DIAGNOSIS — L82.0 INFLAMED SEBORRHEIC KERATOSIS: ICD-10-CM

## 2020-11-24 DIAGNOSIS — D22.9 MULTIPLE BENIGN NEVI: Primary | ICD-10-CM

## 2020-11-24 DIAGNOSIS — D18.01 CHERRY ANGIOMA: ICD-10-CM

## 2020-11-24 DIAGNOSIS — L82.1 SEBORRHEIC KERATOSIS: ICD-10-CM

## 2020-11-24 PROCEDURE — 99213 OFFICE O/P EST LOW 20 MIN: CPT | Mod: 25 | Performed by: DERMATOLOGY

## 2020-11-24 PROCEDURE — 11200 RMVL SKIN TAGS UP TO&INC 15: CPT | Mod: 59 | Performed by: DERMATOLOGY

## 2020-11-24 PROCEDURE — 17110 DESTRUCTION B9 LES UP TO 14: CPT | Performed by: DERMATOLOGY

## 2020-11-24 ASSESSMENT — PAIN SCALES - GENERAL: PAINLEVEL: NO PAIN (0)

## 2020-11-24 NOTE — LETTER
11/24/2020       RE: Florencio Doshi  [address]  [place] [address]     Dear Colleague,    Thank you for referring your patient, Florencio Doshi, to the [facility] [place] at [facility]. Please see a copy of my visit note below.    [facility] Dermatology Note      Dermatology Problem List:  1. Benign bx:  - Inflamed SK, R scalp, bx 3/29/18  2. Verruca vulgaris, L index finger, cryo 12/13/2018.  - Sal acid nightly  3. Allergic contact dermatitis: allergan could have been plant exposure vs dust from saw-dust from cutting siding              - triamcinolone 0.1% ointment BID x 4 weeks    CC:   Chief Complaint   Patient presents with     Skin Check     Florencio has an area on his chest of concern that has now disappeared. He also has a spot on his leg of concern.     Encounter Date: Nov 24, 2020    History of Present Illness:  Mr. Florencio Doshi is a 70 year old male who presents as a follow-up for FBSE. The patient was last seen 9/12/19 when treated with triam 0.1% ointment BID for suspect ACD-reaction with otherwise benign skin findings on FBSE.    Today, he reports a few concerning lesions including:    (1) Left breast - itchy, warty spot that arose a few months ago. Had been present for many years, but became acutely larger, red, and itchy. No pain, tenderness or bleeding.   (2) Left inner thigh - brown, scaly spot. Mildly itchy. No pain, tenderness bleeding.  (3) Skin tags on right and left neck. Mildly itchy, rub on clothing.     The patient otherwise denies any new or concerning lesions. No bleeding, painful, pruritic, or changing lesions. They report no personal history of skin cancer. There is no family history of skin cancer. No history of immunosuppression. There is a remote history of indoor tanning, a few sessions in his 60s prior to vacation. They do use sunscreen and protective clothing when outdoors for  sun protection. There is a history of occupational exposure to ultraviolet light or other forms of radiation. Patient is a retired  (indoor and outdoor work). Health otherwise stable. No other skin concerns.      Past Medical History:   Patient Active Problem List   Diagnosis     Atherosclerosis of aorta (H)     Atherosclerosis of native artery of left lower extremity (H)     CARDIOVASCULAR SCREENING; LDL GOAL LESS THAN 100     Psoriasis     Advance care planning     Gynecomastia     Impaired fasting glucose     Obesity, Class I, BMI 30-34.9     NAFLD (nonalcoholic fatty liver disease)     Elevated transaminase level     Essential hypertension with goal blood pressure less than 140/90     Past Medical History:   Diagnosis Date     Atherosclerosis of aorta (H) 9/17/2010     BPH      CARDIOVASCULAR SCREENING; LDL GOAL LESS THAN 100 10/31/2010    no hx of hyperlipidemia     Dislocation of elbow, left, open 6/19/12     Diverticulosis of colon     pan-     Elevated blood pressure reading without diagnosis of hypertension 9/1/2015     Elevated PSA      Epididymal cyst     RT     Essential hypertension with goal blood pressure less than 140/90      Fall from ladder 6/19/12     Hand eczema      Open fracture of left distal radius 6/19/12     Other atherosclerosis of native arteries of the extremities 9/17/2010     Psoriasis     palms     Pyelonephritis 1964     Past Surgical History:   Procedure Laterality Date     C HAND/FINGER SURGERY UNLISTED       COLONOSCOPY WITH CO2 INSUFFLATION N/A 10/19/2017    Procedure: COLONOSCOPY WITH CO2 INSUFFLATION;  COLON SCREEN/ COHEN;  Surgeon: Tristan Tipton MD;  Location: [facility] OR     HC REPAIR FINGER/HAND FLEXOR NOT ZONE 2, EACH  1991    LT index     OPEN REDUCTION INTERNAL FIXATION ELBOW  6/19/12    LT medial tendon/ligament repair, Dr. Jameel Tian     OPEN REDUCTION INTERNAL FIXATION FOREARM  6/19/12    LT distal radius, Dr. Jameel Tian     PROSTATE BIOPSY, NEEDLE,  SATURATION SAMPLING  07    Dr. Waterman       Social History:  Patient reports that he has never smoked. He has never used smokeless tobacco. He reports current alcohol use. He reports that he does not use drugs.    Family History:  Family History   Problem Relation Age of Onset     Cancer Sister         uterine or ovarian     Hypertension Brother      Aneurysm Paternal Uncle         AAA     Heart Disease Paternal Uncle      Chronic Obstructive Pulmonary Disease Paternal Uncle              Prostate Cancer Maternal Uncle      Cancer Maternal Aunt         bladder or ureter?     Hypertension Mother      Arrhythmia Mother 89        pacemaker for bradycardia     Arthritis Paternal Grandfather         gout     Aneurysm Paternal Grandfather         AAA     Kidney Disease Brother 63        kidney stone, hematuria     Deep Vein Thrombosis Brother 65     Myocardial Infarction Cousin 64     Pancreatic Cancer Maternal Aunt      Alzheimer Disease Paternal Uncle 86     Melanoma No family hx of      Skin Cancer No family hx of        Medications:  Current Outpatient Medications   Medication Sig Dispense Refill     ASPIRIN 81 MG PO TABS 1 TABLET DAILY*       atorvastatin (LIPITOR) 40 MG tablet Take 1 tablet (40 mg) by mouth daily for cholesterol. 90 tablet 1     Cholecalciferol (VITAMIN D) 1000 UNITS capsule 1 capsule daily        etodolac (LODINE) 400 MG tablet Take 1 tablet (400 mg) by mouth 2 times daily (with meals) as needed for wrist or other joint pain. 60 tablet 5     fish oil-omega-3 fatty acids 1000 MG capsule 1 capsule every other day        lisinopril (ZESTRIL) 20 MG tablet Take 1 tablet (20 mg) by mouth daily for blood pressure. 90 tablet 1     sildenafil (REVATIO) 20 MG tablet TAKE ONE TO THREE TABLETS BY MOUTH DAILY AS NEEDED, ONE TO THREE HOURS BEFORE INTAMACY; NEVER USE WITH NITROGLYCERIN, TERAZOSIN OR DOXAZOSIN 50 tablet 3     triamcinolone (ARISTOCORT HP) 0.5 % external cream APPLY SPARINGLY TO AFFECTED  AREA(FOREARM RASH) THREE TIMES A DAY 30 g 1     Allergies   Allergen Reactions     Clindamycin Rash     Review of Systems:  -As per HPI  -Constitutional: Otherwise feeling well today, in usual state of health.  -Skin: As above in HPI. No additional skin concerns.    Physical exam:  Vitals: There were no vitals taken for this visit.  GEN: This is a well developed, well-nourished male in no acute distress, in a pleasant mood.    SKIN: Full skin, which includes the head/face, both arms, chest, back, abdomen,both legs, genitalia and/or groin buttocks, digits and/or nails, was examined.  -Zuniga skin type: I  -Brown macules on sun exposed areas  -Red vascular papules on trunk and extremities  -Brown macules and papules on trunk and extremities without concerning dermoscopy features  -Brown waxy, stuck-on appearing papules on trunk and extremities, including excoriated lesions on the left inner thigh x 1, left breast/chest x 1  -Skin-colored to brown pedunculated papules on the right neck x 1, left neck x 3 with peripheral rim of erythema.   -No other lesions of concern on areas examined.     Labs:  None.     Impression/Plan:    1. Seborrheic keratosis, inflamed. L chest/breast x 1, L inner thigh x 1.     - Cryotherapy procedure note: After verbal consent and discussion of risks and benefits including but no limited to dyspigmentation/scar, blister, and pain, 2 lesions were treated with 1-2mm freeze border for 2 cycles with liquid nitrogen. Post cryotherapy instructions were provided.    2. Inflamed skin tags. R neck x 1, L neck x 4.   - Cryotherapy procedure note: After verbal consent and discussion of risks and benefits including but no limited to dyspigmentation/scar, blister, and pain, 4 lesions were treated with 1-2mm freeze border for 2 cycles with liquid nitrogen. Post cryotherapy instructions were provided.    3. Benign lesions: Multiple benign nevi, solar lentigos, seborrheic keratoses, cherry angiomas.  Explained to patient benign nature of lesion. No treatment is necessary at this time unless the lesion changes or becomes symptomatic.     - ABCDs of melanoma were discussed and self skin checks were advised.  - Sun precaution was advised including the use of sun screens of SPF 30 or higher, sun protective clothing, and avoidance of tanning beds.    CC PRISCILLA Hull CNP  [address] [place] [address] on close of this encounter.    Follow-up in 1 year for FBSE, earlier for new or changing lesions.     Staff Involved:  Staff Only    Rashad Banks MD  Pronouns: he/him/his    Department of Dermatology  [facility]: Phone: [phone], Fax:[phone]  [facility]: Phone: [phone] Fax: [phone]

## 2020-11-24 NOTE — NURSING NOTE
Dermatology Rooming Note    Florencio CHASE Glenda's goals for this visit include:   Chief Complaint   Patient presents with     Skin Check     Florencio has an area on his chest of concern that has now disappeared. He also has a spot on his leg of concern.     Jelena Dickerson, CMA

## 2020-11-24 NOTE — PATIENT INSTRUCTIONS
Cryotherapy    What is it?    Use of a very cold liquid, such as liquid nitrogen, to freeze and destroy abnormal skin cells that need to be removed    What should I expect?    Tenderness and redness    A small blister that might grow and fill with dark purple blood. There may be crusting.    More than one treatment may be needed if the lesions do not go away.    How do I care for the treated area?    Gently wash the area with your hands when bathing.    Use a thin layer of Vaseline to help with healing. You may use a Band-Aid.     The area should heal within 7-10 days and may leave behind a pink or lighter color.     Do not use an antibiotic or Neosporin ointment.     You may take acetaminophen (Tylenol) for pain.     Call your Doctor if you have:    Severe pain    Signs of infection (warmth, redness, cloudy yellow drainage, and or a bad smell)    Questions or concerns    Who should I call with questions?       [facility]: [phone]       [facility]: [phone]       For urgent needs outside of business hours call the [facility] [[email or web address]] at [phone]        and ask for the dermatology resident on call

## 2020-11-24 NOTE — PROGRESS NOTES
[facility] Dermatology Note      Dermatology Problem List:  1. Benign bx:  - Inflamed SK, R scalp, bx 3/29/18  2. Verruca vulgaris, L index finger, cryo 12/13/2018.  - Sal acid nightly  3. Allergic contact dermatitis: allergan could have been plant exposure vs dust from saw-dust from cutting siding              - triamcinolone 0.1% ointment BID x 4 weeks    CC:   Chief Complaint   Patient presents with     Skin Check     Florencio has an area on his chest of concern that has now disappeared. He also has a spot on his leg of concern.     Encounter Date: Nov 24, 2020    History of Present Illness:  Mr. Florencio Doshi is a 70 year old male who presents as a follow-up for FBSE. The patient was last seen 9/12/19 when treated with triam 0.1% ointment BID for suspect ACD-reaction with otherwise benign skin findings on FBSE.    Today, he reports a few concerning lesions including:    (1) Left breast - itchy, warty spot that arose a few months ago. Had been present for many years, but became acutely larger, red, and itchy. No pain, tenderness or bleeding.   (2) Left inner thigh - brown, scaly spot. Mildly itchy. No pain, tenderness bleeding.  (3) Skin tags on right and left neck. Mildly itchy, rub on clothing.     The patient otherwise denies any new or concerning lesions. No bleeding, painful, pruritic, or changing lesions. They report no personal history of skin cancer. There is no family history of skin cancer. No history of immunosuppression. There is a remote history of indoor tanning, a few sessions in his 60s prior to vacation. They do use sunscreen and protective clothing when outdoors for sun protection. There is a history of occupational exposure to ultraviolet light or other forms of radiation. Patient is a retired  (indoor and outdoor work). Health otherwise stable. No other skin concerns.      Past Medical History:   Patient Active Problem List   Diagnosis     Atherosclerosis of aorta  (H)     Atherosclerosis of native artery of left lower extremity (H)     CARDIOVASCULAR SCREENING; LDL GOAL LESS THAN 100     Psoriasis     Advance care planning     Gynecomastia     Impaired fasting glucose     Obesity, Class I, BMI 30-34.9     NAFLD (nonalcoholic fatty liver disease)     Elevated transaminase level     Essential hypertension with goal blood pressure less than 140/90     Past Medical History:   Diagnosis Date     Atherosclerosis of aorta (H) 9/17/2010     BPH      CARDIOVASCULAR SCREENING; LDL GOAL LESS THAN 100 10/31/2010    no hx of hyperlipidemia     Dislocation of elbow, left, open 6/19/12     Diverticulosis of colon     pan-     Elevated blood pressure reading without diagnosis of hypertension 9/1/2015     Elevated PSA      Epididymal cyst     RT     Essential hypertension with goal blood pressure less than 140/90      Fall from ladder 6/19/12     Hand eczema      Open fracture of left distal radius 6/19/12     Other atherosclerosis of native arteries of the extremities 9/17/2010     Psoriasis     palms     Pyelonephritis 1964     Past Surgical History:   Procedure Laterality Date     C HAND/FINGER SURGERY UNLISTED       COLONOSCOPY WITH CO2 INSUFFLATION N/A 10/19/2017    Procedure: COLONOSCOPY WITH CO2 INSUFFLATION;  COLON SCREEN/ COHEN;  Surgeon: Tristan Tipton MD;  Location: [facility] OR     HC REPAIR FINGER/HAND FLEXOR NOT ZONE 2, EACH  1991    LT index     OPEN REDUCTION INTERNAL FIXATION ELBOW  6/19/12    LT medial tendon/ligament repair, Dr. Jameel Tian     OPEN REDUCTION INTERNAL FIXATION FOREARM  6/19/12    LT distal radius, Dr. Jameel Tian     PROSTATE BIOPSY, NEEDLE, SATURATION SAMPLING  7/31/07    Dr. Waterman       Social History:  Patient reports that he has never smoked. He has never used smokeless tobacco. He reports current alcohol use. He reports that he does not use drugs.    Family History:  Family History   Problem Relation Age of Onset     Cancer Sister         uterine or  ovarian     Hypertension Brother      Aneurysm Paternal Uncle         AAA     Heart Disease Paternal Uncle      Chronic Obstructive Pulmonary Disease Paternal Uncle              Prostate Cancer Maternal Uncle      Cancer Maternal Aunt         bladder or ureter?     Hypertension Mother      Arrhythmia Mother 89        pacemaker for bradycardia     Arthritis Paternal Grandfather         gout     Aneurysm Paternal Grandfather         AAA     Kidney Disease Brother 63        kidney stone, hematuria     Deep Vein Thrombosis Brother 65     Myocardial Infarction Cousin 64     Pancreatic Cancer Maternal Aunt      Alzheimer Disease Paternal Uncle 86     Melanoma No family hx of      Skin Cancer No family hx of        Medications:  Current Outpatient Medications   Medication Sig Dispense Refill     ASPIRIN 81 MG PO TABS 1 TABLET DAILY*       atorvastatin (LIPITOR) 40 MG tablet Take 1 tablet (40 mg) by mouth daily for cholesterol. 90 tablet 1     Cholecalciferol (VITAMIN D) 1000 UNITS capsule 1 capsule daily        etodolac (LODINE) 400 MG tablet Take 1 tablet (400 mg) by mouth 2 times daily (with meals) as needed for wrist or other joint pain. 60 tablet 5     fish oil-omega-3 fatty acids 1000 MG capsule 1 capsule every other day        lisinopril (ZESTRIL) 20 MG tablet Take 1 tablet (20 mg) by mouth daily for blood pressure. 90 tablet 1     sildenafil (REVATIO) 20 MG tablet TAKE ONE TO THREE TABLETS BY MOUTH DAILY AS NEEDED, ONE TO THREE HOURS BEFORE INTAMACY; NEVER USE WITH NITROGLYCERIN, TERAZOSIN OR DOXAZOSIN 50 tablet 3     triamcinolone (ARISTOCORT HP) 0.5 % external cream APPLY SPARINGLY TO AFFECTED AREA(FOREARM RASH) THREE TIMES A DAY 30 g 1     Allergies   Allergen Reactions     Clindamycin Rash     Review of Systems:  -As per HPI  -Constitutional: Otherwise feeling well today, in usual state of health.  -Skin: As above in HPI. No additional skin concerns.    Physical exam:  Vitals: There were no vitals taken for  this visit.  GEN: This is a well developed, well-nourished male in no acute distress, in a pleasant mood.    SKIN: Full skin, which includes the head/face, both arms, chest, back, abdomen,both legs, genitalia and/or groin buttocks, digits and/or nails, was examined.  -Zuniga skin type: I  -Brown macules on sun exposed areas  -Red vascular papules on trunk and extremities  -Brown macules and papules on trunk and extremities without concerning dermoscopy features  -Brown waxy, stuck-on appearing papules on trunk and extremities, including excoriated lesions on the left inner thigh x 1, left breast/chest x 1  -Skin-colored to brown pedunculated papules on the right neck x 1, left neck x 3 with peripheral rim of erythema.   -No other lesions of concern on areas examined.     Labs:  None.     Impression/Plan:    1. Seborrheic keratosis, inflamed. L chest/breast x 1, L inner thigh x 1.     - Cryotherapy procedure note: After verbal consent and discussion of risks and benefits including but no limited to dyspigmentation/scar, blister, and pain, 2 lesions were treated with 1-2mm freeze border for 2 cycles with liquid nitrogen. Post cryotherapy instructions were provided.    2. Inflamed skin tags. R neck x 1, L neck x 4.   - Cryotherapy procedure note: After verbal consent and discussion of risks and benefits including but no limited to dyspigmentation/scar, blister, and pain, 4 lesions were treated with 1-2mm freeze border for 2 cycles with liquid nitrogen. Post cryotherapy instructions were provided.    3. Benign lesions: Multiple benign nevi, solar lentigos, seborrheic keratoses, cherry angiomas. Explained to patient benign nature of lesion. No treatment is necessary at this time unless the lesion changes or becomes symptomatic.     - ABCDs of melanoma were discussed and self skin checks were advised.  - Sun precaution was advised including the use of sun screens of SPF 30 or higher, sun protective clothing, and  avoidance of tanning beds.    CC PRISCILLA Hull CNP  [address] [place] [address] on close of this encounter.    Follow-up in 1 year for FBSE, earlier for new or changing lesions.     Staff Involved:  Staff Only    Rashad Banks MD  Pronouns: he/him/his    Department of Dermatology  [facility]: Phone: [phone], Fax:[phone]  [facility]: Phone: [phone] Fax: [phone]

## 2021-03-09 ENCOUNTER — IMMUNIZATION (OUTPATIENT)
Dept: PEDIATRICS | Facility: CLINIC | Age: 71
End: 2021-03-09
Payer: MEDICARE

## 2021-03-09 PROCEDURE — 0001A PR COVID VAC PFIZER DIL RECON 30 MCG/0.3 ML IM: CPT

## 2021-03-09 PROCEDURE — 91300 PR COVID VAC PFIZER DIL RECON 30 MCG/0.3 ML IM: CPT

## 2021-03-26 ENCOUNTER — OFFICE VISIT (OUTPATIENT)
Dept: FAMILY MEDICINE | Facility: CLINIC | Age: 71
End: 2021-03-26
Payer: MEDICARE

## 2021-03-26 VITALS
HEART RATE: 60 BPM | HEIGHT: 67 IN | TEMPERATURE: 97.4 F | DIASTOLIC BLOOD PRESSURE: 83 MMHG | BODY MASS INDEX: 31.71 KG/M2 | RESPIRATION RATE: 18 BRPM | SYSTOLIC BLOOD PRESSURE: 123 MMHG | WEIGHT: 202 LBS | OXYGEN SATURATION: 99 %

## 2021-03-26 DIAGNOSIS — I70.0 ATHEROSCLEROSIS OF AORTA (H): ICD-10-CM

## 2021-03-26 DIAGNOSIS — Z12.5 PROSTATE CANCER SCREENING: ICD-10-CM

## 2021-03-26 DIAGNOSIS — I10 ESSENTIAL HYPERTENSION WITH GOAL BLOOD PRESSURE LESS THAN 140/90: ICD-10-CM

## 2021-03-26 DIAGNOSIS — I70.202 ATHEROSCLEROSIS OF NATIVE ARTERY OF LEFT LOWER EXTREMITY, WITH UNSPECIFIED PRESENCE OF CLINICAL MANIFESTATION (H): Primary | ICD-10-CM

## 2021-03-26 LAB
ALT SERPL W P-5'-P-CCNC: 47 U/L (ref 0–70)
CHOLEST SERPL-MCNC: 118 MG/DL
CREAT SERPL-MCNC: 1.04 MG/DL (ref 0.66–1.25)
GFR SERPL CREATININE-BSD FRML MDRD: 72 ML/MIN/{1.73_M2}
HDLC SERPL-MCNC: 37 MG/DL
LDLC SERPL CALC-MCNC: 48 MG/DL
NONHDLC SERPL-MCNC: 81 MG/DL
POTASSIUM SERPL-SCNC: 4.3 MMOL/L (ref 3.4–5.3)
PSA SERPL-ACNC: 3.81 UG/L (ref 0–4)
TRIGL SERPL-MCNC: 163 MG/DL

## 2021-03-26 PROCEDURE — 36415 COLL VENOUS BLD VENIPUNCTURE: CPT | Performed by: FAMILY MEDICINE

## 2021-03-26 PROCEDURE — 80061 LIPID PANEL: CPT | Performed by: FAMILY MEDICINE

## 2021-03-26 PROCEDURE — 82565 ASSAY OF CREATININE: CPT | Performed by: FAMILY MEDICINE

## 2021-03-26 PROCEDURE — 84132 ASSAY OF SERUM POTASSIUM: CPT | Performed by: FAMILY MEDICINE

## 2021-03-26 PROCEDURE — 99214 OFFICE O/P EST MOD 30 MIN: CPT | Performed by: FAMILY MEDICINE

## 2021-03-26 PROCEDURE — 84460 ALANINE AMINO (ALT) (SGPT): CPT | Performed by: FAMILY MEDICINE

## 2021-03-26 PROCEDURE — G0103 PSA SCREENING: HCPCS | Performed by: FAMILY MEDICINE

## 2021-03-26 RX ORDER — LISINOPRIL 20 MG/1
20 TABLET ORAL DAILY
Qty: 90 TABLET | Refills: 1 | Status: SHIPPED | OUTPATIENT
Start: 2021-03-26 | End: 2021-09-10

## 2021-03-26 RX ORDER — ATORVASTATIN CALCIUM 40 MG/1
40 TABLET, FILM COATED ORAL DAILY
Qty: 90 TABLET | Refills: 1 | Status: SHIPPED | OUTPATIENT
Start: 2021-03-26 | End: 2021-09-10

## 2021-03-26 ASSESSMENT — MIFFLIN-ST. JEOR: SCORE: 1638.86

## 2021-03-26 ASSESSMENT — PAIN SCALES - GENERAL: PAINLEVEL: NO PAIN (0)

## 2021-03-26 NOTE — PROGRESS NOTES
Assessment & Plan     (I70.202) Atherosclerosis of native artery of left lower extremity, with unspecified presence of clinical manifestation (H)  (primary encounter diagnosis)  (I70.0) Atherosclerosis of aorta (H)  Comment: clinically stable, on a high-intensity statin. Fasting.  Plan: atorvastatin (LIPITOR) 40 MG tablet, Lipid         panel reflex to direct LDL Non-fasting, ALT        Return in about 23 weeks (around 9/3/2021) for Medicare annual wellness exam.      (I10) Essential hypertension with goal blood pressure less than 140/90  Comment: well controlled  Plan: lisinopril (ZESTRIL) 20 MG tablet, Potassium,         Creatinine        Return in about 23 weeks (around 9/3/2021) for Medicare annual wellness exam.      (Z12.5) Prostate cancer screening  Comment: pt requests to resume screening. Last done 2016. Notes 10+ years ago he had prostate bx which was negative.  Plan: PSA, screen        Recheck in 2 years if negative.         Return in about 23 weeks (around 9/3/2021) for Medicare annual wellness exam.    Ant Wright MD  [facility]    Tito Matias is a 71 year old who presents for the following health issues     HPI     Hyperlipidemia Follow-Up       Are you regularly taking any medication or supplement to lower your cholesterol?   Yes- Atorvastatin    Are you having muscle aches or other side effects that you think could be caused by your cholesterol lowering medication?  No    Hypertension Follow-up      Do you check your blood pressure regularly outside of the clinic? No     Are you following a low salt diet? No    Are your blood pressures ever more than 140 on the top number (systolic) OR more   than 90 on the bottom number (diastolic), for example 140/90? NA      How many servings of fruits and vegetables do you eat daily?  2-3    On average, how many sweetened beverages do you drink each day (Examples: soda, juice, sweet tea, etc.  Do NOT count diet or  "artificially sweetened beverages)?   1-2    How many days per week do you exercise enough to make your heart beat faster? 4    How many minutes a day do you exercise enough to make your heart beat faster? 30 - 60    How many days per week do you miss taking your medication? 0    Review of Systems   Constitutional, HEENT, cardiovascular, pulmonary, gi and gu systems are negative, except as otherwise noted.      Objective    /83 (BP Location: Right arm, Patient Position: Sitting, Cuff Size: Adult Large)   Pulse 60   Temp 97.4  F (36.3  C) (Tympanic)   Resp 18   Ht 1.708 m (5' 7.25\")   Wt 91.6 kg (202 lb)   SpO2 99%   BMI 31.40 kg/m    Body mass index is 31.4 kg/m .  Physical Exam   GENERAL: healthy, alert and no distress  EYES: Eyes grossly normal to inspection, PERRL, EOMI, sclerae white and conjunctivae normal  RESP: lungs clear to auscultation - no crackles or wheezes, no areas of dullness, no tachypnea  CV: Heart regular rate and rhythm without murmur, click or rub. No peripheral edema and peripheral pulses strong  MS: no gross musculoskeletal defects noted, no edema  SKIN: no suspicious lesions or rashes to visible skin  NEURO: Normal strength and tone, sensory exam grossly normal, mentation intact, oriented times 3 and cranial nerves 2-12 intact  PSYCH: mentation appears normal, affect normal/bright     Office Visit on 09/03/2020   Component Date Value Ref Range Status     Cholesterol 09/03/2020 112  <200 mg/dL Final     Triglycerides 09/03/2020 95  <150 mg/dL Final    Fasting specimen     HDL Cholesterol 09/03/2020 56  >39 mg/dL Final     LDL Cholesterol Calculated 09/03/2020 37  <100 mg/dL Final    Desirable:       <100 mg/dl     Non HDL Cholesterol 09/03/2020 56  <130 mg/dL Final     Bilirubin Direct 09/03/2020 0.2  0.0 - 0.2 mg/dL Final     Bilirubin Total 09/03/2020 0.6  0.2 - 1.3 mg/dL Final     Albumin 09/03/2020 3.9  3.4 - 5.0 g/dL Final     Protein Total 09/03/2020 7.7  6.8 - 8.8 g/dL Final "     Alkaline Phosphatase 09/03/2020 94  40 - 150 U/L Final     ALT 09/03/2020 63  0 - 70 U/L Final     AST 09/03/2020 34  0 - 45 U/L Final     Sodium 09/03/2020 142  133 - 144 mmol/L Final     Potassium 09/03/2020 4.2  3.4 - 5.3 mmol/L Final     Chloride 09/03/2020 109  94 - 109 mmol/L Final     Carbon Dioxide 09/03/2020 25  20 - 32 mmol/L Final     Anion Gap 09/03/2020 8  3 - 14 mmol/L Final     Glucose 09/03/2020 102* 70 - 99 mg/dL Final    Fasting specimen     Urea Nitrogen 09/03/2020 17  7 - 30 mg/dL Final     Creatinine 09/03/2020 0.96  0.66 - 1.25 mg/dL Final     GFR Estimate 09/03/2020 80  >60 mL/min/[1.73_m2] Final    Comment: Non  GFR Calc  Starting 12/18/2018, serum creatinine based estimated GFR (eGFR) will be   calculated using the Chronic Kidney Disease Epidemiology Collaboration   (CKD-EPI) equation.       Calcium 09/03/2020 8.6  8.5 - 10.1 mg/dL Final     Hemoglobin A1C 09/03/2020 5.9* 0 - 5.6 % Final    Comment: Normal <5.7% Prediabetes 5.7-6.4%  Diabetes 6.5% or higher - adopted from ADA   consensus guidelines.

## 2021-03-30 ENCOUNTER — IMMUNIZATION (OUTPATIENT)
Dept: PEDIATRICS | Facility: CLINIC | Age: 71
End: 2021-03-30
Attending: INTERNAL MEDICINE
Payer: MEDICARE

## 2021-03-30 PROCEDURE — 0002A PR COVID VAC PFIZER DIL RECON 30 MCG/0.3 ML IM: CPT

## 2021-03-30 PROCEDURE — 91300 PR COVID VAC PFIZER DIL RECON 30 MCG/0.3 ML IM: CPT

## 2021-04-05 ENCOUNTER — NURSE TRIAGE (OUTPATIENT)
Dept: NURSING | Facility: CLINIC | Age: 71
End: 2021-04-05

## 2021-04-05 NOTE — TELEPHONE ENCOUNTER
Triage call:   States that he had some blood tests done in the clinic and has not gotten his results in the mail.   Patient states that he was fasting for his labs that were drawn on 3/26/21  - there was a comment on his labs that he was not fasting  Patient would like clarification on this comment- please review and advise.     Gricelda Avila RN BSN 4/5/2021 8:50 AM    Additional Information    Negative: Nursing judgment    Negative: Nursing judgment    Negative: Nursing judgment    Nursing judgment    Protocols used: NO PROTOCOL AVAILABLE - INFORMATION ONLY-A-[place]

## 2021-04-05 NOTE — TELEPHONE ENCOUNTER
"3/26/2021 labs are listed as non-fasting specimen    Called [facility] and spoke with Kari.  She will change the status of 3/26/2021 labs to fasting specimen as soon as she can get to this today.    Per result note from Martir Le PA-C (covering provider for Dr. Wright)-  \"Reviewed and normal for patient for not being fasting\"    Routing to provider to please advise on results as this was a FASTING specimen    Hong Ortiz RN  [facility]      "

## 2021-09-10 ENCOUNTER — OFFICE VISIT (OUTPATIENT)
Dept: FAMILY MEDICINE | Facility: CLINIC | Age: 71
End: 2021-09-10
Payer: MEDICARE

## 2021-09-10 VITALS
BODY MASS INDEX: 31.61 KG/M2 | WEIGHT: 201.4 LBS | TEMPERATURE: 96.7 F | HEART RATE: 57 BPM | SYSTOLIC BLOOD PRESSURE: 134 MMHG | OXYGEN SATURATION: 100 % | HEIGHT: 67 IN | DIASTOLIC BLOOD PRESSURE: 78 MMHG

## 2021-09-10 DIAGNOSIS — Z00.00 ENCOUNTER FOR MEDICARE ANNUAL WELLNESS EXAM: Primary | ICD-10-CM

## 2021-09-10 DIAGNOSIS — R73.01 IMPAIRED FASTING GLUCOSE: ICD-10-CM

## 2021-09-10 DIAGNOSIS — I70.0 ATHEROSCLEROSIS OF AORTA (H): ICD-10-CM

## 2021-09-10 DIAGNOSIS — I10 ESSENTIAL HYPERTENSION WITH GOAL BLOOD PRESSURE LESS THAN 140/90: ICD-10-CM

## 2021-09-10 DIAGNOSIS — M25.531 PAIN IN BOTH WRISTS: ICD-10-CM

## 2021-09-10 DIAGNOSIS — I70.202 ATHEROSCLEROSIS OF NATIVE ARTERY OF LEFT LOWER EXTREMITY, WITH UNSPECIFIED PRESENCE OF CLINICAL MANIFESTATION (H): ICD-10-CM

## 2021-09-10 DIAGNOSIS — M25.532 PAIN IN BOTH WRISTS: ICD-10-CM

## 2021-09-10 DIAGNOSIS — Z23 NEED FOR VACCINATION: ICD-10-CM

## 2021-09-10 DIAGNOSIS — N52.9 ERECTILE DYSFUNCTION, UNSPECIFIED ERECTILE DYSFUNCTION TYPE: ICD-10-CM

## 2021-09-10 LAB
ALT SERPL W P-5'-P-CCNC: 51 U/L (ref 0–70)
ANION GAP SERPL CALCULATED.3IONS-SCNC: 3 MMOL/L (ref 3–14)
BUN SERPL-MCNC: 21 MG/DL (ref 7–30)
CALCIUM SERPL-MCNC: 8.9 MG/DL (ref 8.5–10.1)
CHLORIDE BLD-SCNC: 107 MMOL/L (ref 94–109)
CHOLEST SERPL-MCNC: 107 MG/DL
CO2 SERPL-SCNC: 28 MMOL/L (ref 20–32)
CREAT SERPL-MCNC: 1.09 MG/DL (ref 0.66–1.25)
FASTING STATUS PATIENT QL REPORTED: YES
GFR SERPL CREATININE-BSD FRML MDRD: 68 ML/MIN/1.73M2
GLUCOSE BLD-MCNC: 111 MG/DL (ref 70–99)
HBA1C MFR BLD: 5.8 % (ref 0–5.6)
HDLC SERPL-MCNC: 41 MG/DL
LDLC SERPL CALC-MCNC: 41 MG/DL
NONHDLC SERPL-MCNC: 66 MG/DL
POTASSIUM BLD-SCNC: 4.6 MMOL/L (ref 3.4–5.3)
SODIUM SERPL-SCNC: 138 MMOL/L (ref 133–144)
TRIGL SERPL-MCNC: 123 MG/DL

## 2021-09-10 PROCEDURE — 84460 ALANINE AMINO (ALT) (SGPT): CPT | Performed by: FAMILY MEDICINE

## 2021-09-10 PROCEDURE — 83036 HEMOGLOBIN GLYCOSYLATED A1C: CPT | Performed by: FAMILY MEDICINE

## 2021-09-10 PROCEDURE — G0439 PPPS, SUBSEQ VISIT: HCPCS | Performed by: FAMILY MEDICINE

## 2021-09-10 PROCEDURE — G0008 ADMIN INFLUENZA VIRUS VAC: HCPCS | Performed by: FAMILY MEDICINE

## 2021-09-10 PROCEDURE — 99214 OFFICE O/P EST MOD 30 MIN: CPT | Mod: 25 | Performed by: FAMILY MEDICINE

## 2021-09-10 PROCEDURE — 80061 LIPID PANEL: CPT | Performed by: FAMILY MEDICINE

## 2021-09-10 PROCEDURE — 80048 BASIC METABOLIC PNL TOTAL CA: CPT | Performed by: FAMILY MEDICINE

## 2021-09-10 PROCEDURE — 90662 IIV NO PRSV INCREASED AG IM: CPT | Performed by: FAMILY MEDICINE

## 2021-09-10 PROCEDURE — 36415 COLL VENOUS BLD VENIPUNCTURE: CPT | Performed by: FAMILY MEDICINE

## 2021-09-10 RX ORDER — ETODOLAC 400 MG
400 TABLET ORAL 2 TIMES DAILY WITH MEALS
Qty: 60 TABLET | Refills: 5 | Status: SHIPPED | OUTPATIENT
Start: 2021-09-10 | End: 2022-09-12

## 2021-09-10 RX ORDER — SILDENAFIL CITRATE 20 MG/1
TABLET ORAL
Qty: 50 TABLET | Refills: 3 | Status: SHIPPED | OUTPATIENT
Start: 2021-09-10 | End: 2022-09-12

## 2021-09-10 RX ORDER — ATORVASTATIN CALCIUM 40 MG/1
40 TABLET, FILM COATED ORAL DAILY
Qty: 90 TABLET | Refills: 1 | Status: SHIPPED | OUTPATIENT
Start: 2021-09-10 | End: 2022-04-25

## 2021-09-10 RX ORDER — LISINOPRIL 20 MG/1
20 TABLET ORAL DAILY
Qty: 90 TABLET | Refills: 1 | Status: SHIPPED | OUTPATIENT
Start: 2021-09-10 | End: 2022-04-12

## 2021-09-10 ASSESSMENT — MIFFLIN-ST. JEOR: SCORE: 1632.17

## 2021-09-10 NOTE — PROGRESS NOTES
"  SUBJECTIVE:   Florencio Doshi is a 71 year old male who presents for Preventive Visit.      Patient has been advised of split billing requirements and indicates understanding: Yes  Are you in the first 12 months of your Medicare Part B coverage?  No    Physical Health:    In general, how would you rate your overall physical health? good    Outside of work, how many days during the week do you exercise? 4-5 days/week    Outside of work, approximately how many minutes a day do you exercise?45-60 minutes    If you drink alcohol do you typically have >3 drinks per day or >7 drinks per week? No    Do you usually eat at least 4 servings of fruit and vegetables a day, include whole grains & fiber and avoid regularly eating high fat or \"junk\" foods? NO, not quite    Do you have any problems taking medications regularly?  No    Do you have any side effects from medications? none    Needs assistance for the following daily activities: no assistance needed    Which of the following safety concerns are present in your home?  none identified     Hearing impairment: No    In the past 6 months, have you been bothered by leaking of urine? no    Mental Health:    In general, how would you rate your overall mental or emotional health? good  PHQ-2 Score:      Do you feel safe in your environment? Yes    Have you ever done Advance Care Planning? (For example, a Health Directive, POLST, or a discussion with a medical provider or your loved ones about your wishes): No, advance care planning information given to patient to review.  Advanced care planning was discussed at today's visit. (pt states he still has form from previous discussion)    Additional concerns to address?  YES    Fall risk:  Fallen 2 or more times in the past year?: No  Any fall with injury in the past year?: No    Cognitive Screenin) Repeat 3 items (Leader, Season, Table)    2) Clock draw: NORMAL  3) 3 item recall: Recalls 3 objects  Results: 3 items recalled: " COGNITIVE IMPAIRMENT LESS LIKELY    Mini-CogTM Copyright JR Sin. Licensed by the author for use in [facility]; reprinted with permission (stephen@.[facility]). All rights reserved.      Do you have sleep apnea, excessive snoring or daytime drowsiness?: no    Hyperlipidemia Follow-Up      Are you regularly taking any medication or supplement to lower your cholesterol?   Yes- atorvasatin    Are you having muscle aches or other side effects that you think could be caused by your cholesterol lowering medication?  No    Hypertension Follow-up      Do you check your blood pressure regularly outside of the clinic? No     Are you following a low salt diet? Yes    Are your blood pressures ever more than 140 on the top number (systolic) OR more   than 90 on the bottom number (diastolic), for example 140/90?       Social History     Tobacco Use     Smoking status: Never Smoker     Smokeless tobacco: Never Used   Substance Use Topics     Alcohol use: Yes     Comment: occassionally                           Current providers sharing in care for this patient include:   Patient Care Team:  Ant Wright MD as PCP - General  Ant Wright MD as Assigned PCP  Ant Wright MD as MD (Family Practice)  Yuni Robbins MD as MD (Internal Medicine)  Rashad Banks MD as Assigned Surgical Provider    The following health maintenance items are reviewed in Epic and correct as of today:  Health Maintenance   Topic Date Due     INFLUENZA VACCINE (1) 09/01/2021     FALL RISK ASSESSMENT  09/03/2021     LIPID  03/26/2022     MEDICARE ANNUAL WELLNESS VISIT  09/10/2022     ANNUAL REVIEW OF HM ORDERS  09/10/2022     ADVANCE CARE PLANNING  09/07/2025     COLORECTAL CANCER SCREENING  10/19/2027     DTAP/TDAP/TD IMMUNIZATION (4 - Td or Tdap) 06/03/2029     HEPATITIS C SCREENING  Completed     PHQ-2  Completed     Pneumococcal Vaccine: 65+ Years  Completed     ZOSTER IMMUNIZATION  Completed     AORTIC ANEURYSM  "SCREENING (SYSTEM ASSIGNED)  Completed     COVID-19 Vaccine  Completed     IPV IMMUNIZATION  Aged Out     MENINGITIS IMMUNIZATION  Aged Out     HEPATITIS B IMMUNIZATION  Aged Out     ROS:  Constitutional, HEENT, cardiovascular, pulmonary, GI, , musculoskeletal, neuro, skin, endocrine and psych systems are negative, except as otherwise noted.    OBJECTIVE:   /78   Pulse 57   Temp (!) 96.7  F (35.9  C) (Tympanic)   Ht 1.702 m (5' 7\")   Wt 91.4 kg (201 lb 6.4 oz)   SpO2 100%   BMI 31.54 kg/m   Estimated body mass index is 31.54 kg/m  as calculated from the following:    Height as of this encounter: 1.702 m (5' 7\").    Weight as of this encounter: 91.4 kg (201 lb 6.4 oz).  EXAM:   GENERAL: healthy, alert and no distress  EYES: Eyes grossly normal to inspection, PERRL and conjunctivae and sclerae normal  HENT: ear canals and TM's normal, nose and mouth without ulcers or lesions  NECK: no adenopathy, no asymmetry, masses, or scars and thyroid normal to palpation  RESP: lungs clear to auscultation - no rales, rhonchi or wheezes  CV: regular rate and rhythm, normal S1 S2, no S3 or S4, no murmur, click or rub, no peripheral edema and peripheral pulses strong  ABDOMEN: soft, nontender, no hepatosplenomegaly, no masses and bowel sounds normal   (male): testicles normal without atrophy or masses, hydrocele present right, no hernias and penis normal without urethral discharge  MS: no gross musculoskeletal defects noted, no edema  SKIN: no suspicious lesions or rashes  NEURO: Normal strength and tone, mentation intact and speech normal  PSYCH: mentation appears normal, affect normal/bright    Office Visit on 03/26/2021   Component Date Value Ref Range Status     Cholesterol 03/26/2021 118  <200 mg/dL Final     Triglycerides 03/26/2021 163* <150 mg/dL Final    Comment: Borderline high:  150-199 mg/dl  High:             200-499 mg/dl  Very high:       >499 mg/dl  Non Fasting       HDL Cholesterol 03/26/2021 37* >39 " mg/dL Final     LDL Cholesterol Calculated 03/26/2021 48  <100 mg/dL Final    Desirable:       <100 mg/dl     Non HDL Cholesterol 03/26/2021 81  <130 mg/dL Final     ALT 03/26/2021 47  0 - 70 U/L Final     Potassium 03/26/2021 4.3  3.4 - 5.3 mmol/L Final     Creatinine 03/26/2021 1.04  0.66 - 1.25 mg/dL Final     GFR Estimate 03/26/2021 72  >60 mL/min/[1.73_m2] Final    Comment: Non  GFR Calc  Starting 12/18/2018, serum creatinine based estimated GFR (eGFR) will be   calculated using the Chronic Kidney Disease Epidemiology Collaboration   (CKD-EPI) equation.       GFR Estimate If Black 03/26/2021 84  >60 mL/min/[1.73_m2] Final    Comment:  GFR Calc  Starting 12/18/2018, serum creatinine based estimated GFR (eGFR) will be   calculated using the Chronic Kidney Disease Epidemiology Collaboration   (CKD-EPI) equation.       PSA 03/26/2021 3.81  0 - 4 ug/L Final    Assay Method:  Chemiluminescence using Siemens Vista analyzer        ASSESSMENT / PLAN:   (Z00.00) Encounter for Medicare annual wellness exam  (primary encounter diagnosis)  Comment: Negative screening exam; up-to-date on preventive services.   Plan: INFLUENZA, QUAD, HIGH DOSE, PF, 65YR + (FLUZONE        HD)        F/u 1year    (I70.202) Atherosclerosis of native artery of left lower extremity, with unspecified presence of clinical manifestation (H)  (I70.0) Atherosclerosis of aorta (H)  Comment: clinically stable, on a high-intensity statin, fasting  Plan: atorvastatin (LIPITOR) 40 MG tablet, Lipid         panel reflex to direct LDL Non-fasting, ALT        Recheck lipids in 6-12 months    (I10) Essential hypertension with goal blood pressure less than 140/90  Comment: well controlled  Plan: lisinopril (ZESTRIL) 20 MG tablet, Basic         metabolic panel        Return in about 6 months (around 3/10/2022) for blood pressure check.      (M25.531,  M25.532) Pain in both wrists  Comment: refill request   Plan: etodolac (LODINE)  "400 MG tablet, Basic         metabolic panel          (N52.9) Erectile dysfunction, unspecified erectile dysfunction type  Comment: refill request   Plan: sildenafil (REVATIO) 20 MG tablet          (R73.01) Impaired fasting glucose  Comment:   Plan: Basic metabolic panel, Hemoglobin A1c          (Z23) Need for vaccination  Comment: Influenza vaccine offered and accepted by patient. He has received it before without problems.   Plan: INFLUENZA, QUAD, HIGH DOSE, PF, 65YR + (FLUZONE        HD)                COUNSELING:  Reviewed preventive health counseling, as reflected in patient instructions  Special attention given to:       Regular exercise       Immunizations    Vaccinated for: Influenza          Estimated body mass index is 31.54 kg/m  as calculated from the following:    Height as of this encounter: 1.702 m (5' 7\").    Weight as of this encounter: 91.4 kg (201 lb 6.4 oz).    Weight management plan: Discussed healthy diet and exercise guidelines . Exercise routine reviewed and approved (bikes frequently)    He reports that he has never smoked. He has never used smokeless tobacco.    Appropriate preventive services were discussed with this patient, including applicable screening as appropriate for cardiovascular disease, diabetes, osteopenia/osteoporosis, and glaucoma.  As appropriate for age/gender, discussed screening for colorectal cancer, prostate cancer, breast cancer, and cervical cancer. Checklist reviewing preventive services available has been given to the patient.    Reviewed patients plan of care and provided an AVS. The Basic Care Plan (routine screening as documented in Health Maintenance) for Florencio meets the Care Plan requirement. This Care Plan has been established and reviewed with the Patient.    Counseling Resources:  ATP IV Guidelines  Pooled Cohorts Equation Calculator  Breast Cancer Risk Calculator  BRCA-Related Cancer Risk Assessment: FHS-7 Tool  FRAX Risk Assessment  ICSI Preventive " Guidelines  Dietary Guidelines for Americans, 2010  USDA's MyPlate  ASA Prophylaxis  Lung CA Screening    Ant Wright MD  [facility]

## 2021-09-10 NOTE — PATIENT INSTRUCTIONS
Patient Education   Personalized Prevention Plan  You are due for the preventive services outlined below.  Your care team is available to assist you in scheduling these services.  If you have already completed any of these items, please share that information with your care team to update in your medical record.  Health Maintenance Due   Topic Date Due     ANNUAL REVIEW OF HM ORDERS  Never done     Flu Vaccine (1) 09/01/2021     FALL RISK ASSESSMENT  09/03/2021     Annual Wellness Visit  09/03/2021

## 2021-09-10 NOTE — LETTER
September 14, 2021      Florencio Doshi  [address] [place] [address]        Mr. Doshi,     All of your test results were normal or within the usual/expected range for you.     Please contact the clinic if you have additional questions.  Thank you.     Sincerely,       Ant Wright MD       Resulted Orders   Lipid panel reflex to direct LDL Non-fasting   Result Value Ref Range    Cholesterol 107 <200 mg/dL      Comment:      Age 0-19 years  Desirable: <170 mg/dL  Borderline high:  170-199 mg/dl  High:            >199 mg/dl    Age 20 years and older  Desirable: <200 mg/dL    Triglycerides 123 <150 mg/dL      Comment:      0-9 years:  Normal:    Less than 75 mg/dL  Borderline high:  75-99 mg/dL  High:             Greater than or equal to 100 mg/dL    0-19 years:  Normal:    Less than 90 mg/dL  Borderline high:   mg/dL  High:             Greater than or equal to 130 mg/dL    20 years and older:  Normal:    Less than 150 mg/dL  Borderline high:  150-199 mg/dL  High:             200-499 mg/dL  Very high:   Greater than or equal to 500 mg/dL    Direct Measure HDL 41 >=40 mg/dL      Comment:      0-19 years:       Greater than or equal to 45 mg/dL   Low: Less than 40 mg/dL   Borderline low: 40-44 mg/dL     20 years and older:   Female: Greater than or equal to 50 mg/dL   Male:   Greater than or equal to 40 mg/dL         LDL Cholesterol Calculated 41 <=100 mg/dL      Comment:      Age 0-19 years:  Desirable: 0-110 mg/dL   Borderline high: 110-129 mg/dL   High: >= 130 mg/dL    Age 20 years and older:  Desirable: <100mg/dL  Above desirable: 100-129 mg/dL   Borderline high: 130-159 mg/dL   High: 160-189 mg/dL   Very high: >= 190 mg/dL    Non HDL Cholesterol 66 <130 mg/dL      Comment:      0-19 years:  Desirable:          Less than 120 mg/dL  Borderline high:   120-144 mg/dL  High:                   Greater than or equal to 145 mg/dL    20 years and older:  Desirable:          130 mg/dL  Above  Desirable: 130-159 mg/dL  Borderline high:   160-189 mg/dL  High:               190-219 mg/dL  Very high:     Greater than or equal to 220 mg/dL    Patient Fasting > 8hrs? Yes    ALT   Result Value Ref Range    ALT 51 0 - 70 U/L   Basic metabolic panel   Result Value Ref Range    Sodium 138 133 - 144 mmol/L    Potassium 4.6 3.4 - 5.3 mmol/L    Chloride 107 94 - 109 mmol/L    Carbon Dioxide (CO2) 28 20 - 32 mmol/L    Anion Gap 3 3 - 14 mmol/L    Urea Nitrogen 21 7 - 30 mg/dL    Creatinine 1.09 0.66 - 1.25 mg/dL    Calcium 8.9 8.5 - 10.1 mg/dL    Glucose 111 (H) 70 - 99 mg/dL    GFR Estimate 68 >60 mL/min/1.73m2      Comment:      As of July 11, 2021, eGFR is calculated by the CKD-EPI creatinine equation, without race adjustment. eGFR can be influenced by muscle mass, exercise, and diet. The reported eGFR is an estimation only and is only applicable if the renal function is stable.   Hemoglobin A1c   Result Value Ref Range    Hemoglobin A1C 5.8 (H) 0.0 - 5.6 %      Comment:      Normal <5.7%   Prediabetes 5.7-6.4%    Diabetes 6.5% or higher     Note: Adopted from ADA consensus guidelines.

## 2021-09-25 DIAGNOSIS — I10 ESSENTIAL HYPERTENSION WITH GOAL BLOOD PRESSURE LESS THAN 140/90: ICD-10-CM

## 2021-09-27 RX ORDER — LISINOPRIL 20 MG/1
TABLET ORAL
Qty: 90 TABLET | Refills: 1 | OUTPATIENT
Start: 2021-09-27

## 2022-04-12 ENCOUNTER — IMMUNIZATION (OUTPATIENT)
Dept: NURSING | Facility: CLINIC | Age: 72
End: 2022-04-12
Payer: MEDICARE

## 2022-04-12 PROCEDURE — 0054A COVID-19,PF,PFIZER (12+ YRS): CPT

## 2022-04-12 PROCEDURE — 91305 COVID-19,PF,PFIZER (12+ YRS): CPT

## 2022-04-25 ENCOUNTER — OFFICE VISIT (OUTPATIENT)
Dept: FAMILY MEDICINE | Facility: CLINIC | Age: 72
End: 2022-04-25
Payer: MEDICARE

## 2022-04-25 VITALS
DIASTOLIC BLOOD PRESSURE: 80 MMHG | BODY MASS INDEX: 31.71 KG/M2 | HEIGHT: 67 IN | RESPIRATION RATE: 18 BRPM | OXYGEN SATURATION: 98 % | SYSTOLIC BLOOD PRESSURE: 134 MMHG | WEIGHT: 202 LBS | HEART RATE: 52 BPM | TEMPERATURE: 96.9 F

## 2022-04-25 DIAGNOSIS — I70.0 ATHEROSCLEROSIS OF AORTA (H): ICD-10-CM

## 2022-04-25 DIAGNOSIS — I70.202 ATHEROSCLEROSIS OF NATIVE ARTERY OF LEFT LOWER EXTREMITY, WITH UNSPECIFIED PRESENCE OF CLINICAL MANIFESTATION (H): ICD-10-CM

## 2022-04-25 DIAGNOSIS — I10 ESSENTIAL HYPERTENSION WITH GOAL BLOOD PRESSURE LESS THAN 140/90: ICD-10-CM

## 2022-04-25 LAB
ALT SERPL W P-5'-P-CCNC: 44 U/L (ref 0–70)
CHOLEST SERPL-MCNC: 117 MG/DL
CREAT SERPL-MCNC: 0.96 MG/DL (ref 0.66–1.25)
FASTING STATUS PATIENT QL REPORTED: YES
GFR SERPL CREATININE-BSD FRML MDRD: 85 ML/MIN/1.73M2
HDLC SERPL-MCNC: 47 MG/DL
LDLC SERPL CALC-MCNC: 46 MG/DL
NONHDLC SERPL-MCNC: 70 MG/DL
POTASSIUM BLD-SCNC: 4.8 MMOL/L (ref 3.4–5.3)
TRIGL SERPL-MCNC: 118 MG/DL

## 2022-04-25 PROCEDURE — 84132 ASSAY OF SERUM POTASSIUM: CPT | Performed by: FAMILY MEDICINE

## 2022-04-25 PROCEDURE — 82565 ASSAY OF CREATININE: CPT | Performed by: FAMILY MEDICINE

## 2022-04-25 PROCEDURE — 99214 OFFICE O/P EST MOD 30 MIN: CPT | Performed by: FAMILY MEDICINE

## 2022-04-25 PROCEDURE — 84460 ALANINE AMINO (ALT) (SGPT): CPT | Performed by: FAMILY MEDICINE

## 2022-04-25 PROCEDURE — 80061 LIPID PANEL: CPT | Performed by: FAMILY MEDICINE

## 2022-04-25 PROCEDURE — 36415 COLL VENOUS BLD VENIPUNCTURE: CPT | Performed by: FAMILY MEDICINE

## 2022-04-25 RX ORDER — LISINOPRIL 20 MG/1
20 TABLET ORAL DAILY
Qty: 90 TABLET | Refills: 0 | Status: SHIPPED | OUTPATIENT
Start: 2022-04-25 | End: 2022-09-12

## 2022-04-25 RX ORDER — ATORVASTATIN CALCIUM 40 MG/1
40 TABLET, FILM COATED ORAL DAILY
Qty: 90 TABLET | Refills: 1 | Status: SHIPPED | OUTPATIENT
Start: 2022-04-25 | End: 2022-09-12

## 2022-04-25 ASSESSMENT — PAIN SCALES - GENERAL: PAINLEVEL: NO PAIN (0)

## 2022-04-25 NOTE — PATIENT INSTRUCTIONS
At [facility], we strive to deliver an exceptional experience to you, every time we see you. If you receive a survey, please complete it as we do value your feedback.  If you have MyChart, you can expect to receive results automatically within 24 hours of their completion.  Your provider will send a note interpreting your results as well.   If you do not have MyChart, you should receive your results in about a week by mail.    Your care team:                            Family Medicine Internal Medicine   MD Ortiz Carson MD Shantel Branch-Fleming, MD Srinivasa Vaka, MD Katya Belousova, PRISCILLA Cole CNP, MD ([facility]   Blaine Le, MD Celia Fung MD Amelia Massimini APRN CNP Kim Thein, APRN CNP Bethany Templen, MD             Clinic hours: Monday - Thursday 7 am-6 pm; Fridays 7 am-5 pm.   Urgent care: Monday - Friday 10 am- 8 pm; Saturday and Sunday 9 am-5 pm.    Clinic: [phone]       [facility]: Monday - Thursday 8 am - 7 pm; Friday 8 am - 6 pm  [facility]: [phone]

## 2022-04-25 NOTE — PROGRESS NOTES
Assessment & Plan     (I70.202) Atherosclerosis of native artery of left lower extremity, with unspecified presence of clinical manifestation (H)  (I70.0) Atherosclerosis of aorta (H)  Comment: clinically stable, on a high-intensity statin, fasting  Plan: atorvastatin (LIPITOR) 40 MG tablet, Lipid         panel reflex to direct LDL Non-fasting, ALT        Return in about 5 months (around 9/10/2022) for Medicare annual wellness exam, blood pressure check, recheck medications.      (I10) Essential hypertension with goal blood pressure less than 140/90  Comment: well controlled  Plan: lisinopril (ZESTRIL) 20 MG tablet, Potassium,         Creatinine        Return in about 5 months (around 9/10/2022) for Medicare annual wellness exam, blood pressure check, recheck medications.        Ant Wright MD  [facility]    Tito Matias is a 72 year old who presents for the following health issues  History of Present Illness       Hypertension: He presents for follow up of hypertension.  He does not check blood pressure  regularly outside of the clinic. Outpatient blood pressures have not been over 140/90. He follows a low salt diet.     He eats 2-3 servings of fruits and vegetables daily.He consumes 0 sweetened beverage(s) daily.He exercises with enough effort to increase his heart rate 30 to 60 minutes per day.  He exercises with enough effort to increase his heart rate 5 days per week.   He is taking medications regularly.       Hyperlipidemia Follow-Up      Are you regularly taking any medication or supplement to lower your cholesterol?   Yes- atorvastatin    Are you having muscle aches or other side effects that you think could be caused by your cholesterol lowering medication?  No        Review of Systems   Constitutional, HEENT, cardiovascular, pulmonary, gi and gu systems are negative, except as otherwise noted.      Objective    /80   Pulse 52   Temp 96.9  F (36.1  C)  "(Tympanic)   Resp 18   Ht 1.702 m (5' 7\")   Wt 91.6 kg (202 lb)   SpO2 98%   BMI 31.64 kg/m    Body mass index is 31.64 kg/m .  Physical Exam   GENERAL: healthy, alert and no distress  EYES: Eyes grossly normal to inspection, PERRL, EOMI, sclerae white and conjunctivae normal  RESP: lungs clear to auscultation - no crackles or wheezes, no areas of dullness, no tachypnea  CV: Heart regular rate and rhythm without murmur, click or rub. No peripheral edema and peripheral pulses strong  MS: no gross musculoskeletal defects noted, no edema  SKIN: no suspicious lesions or rashes to visible skin  NEURO: Normal strength and tone, sensory exam grossly normal, mentation intact, oriented times 3 and cranial nerves 2-12 intact  PSYCH: mentation appears normal, affect normal/bright     Office Visit on 09/10/2021   Component Date Value Ref Range Status     Cholesterol 09/10/2021 107  <200 mg/dL Final    Age 0-19 years  Desirable: <170 mg/dL  Borderline high:  170-199 mg/dl  High:            >199 mg/dl    Age 20 years and older  Desirable: <200 mg/dL     Triglycerides 09/10/2021 123  <150 mg/dL Final    0-9 years:  Normal:    Less than 75 mg/dL  Borderline high:  75-99 mg/dL  High:             Greater than or equal to 100 mg/dL    0-19 years:  Normal:    Less than 90 mg/dL  Borderline high:   mg/dL  High:             Greater than or equal to 130 mg/dL    20 years and older:  Normal:    Less than 150 mg/dL  Borderline high:  150-199 mg/dL  High:             200-499 mg/dL  Very high:   Greater than or equal to 500 mg/dL     Direct Measure HDL 09/10/2021 41  >=40 mg/dL Final    0-19 years:       Greater than or equal to 45 mg/dL   Low: Less than 40 mg/dL   Borderline low: 40-44 mg/dL     20 years and older:   Female: Greater than or equal to 50 mg/dL   Male:   Greater than or equal to 40 mg/dL          LDL Cholesterol Calculated 09/10/2021 41  <=100 mg/dL Final    Age 0-19 years:  Desirable: 0-110 mg/dL   Borderline high: " 110-129 mg/dL   High: >= 130 mg/dL    Age 20 years and older:  Desirable: <100mg/dL  Above desirable: 100-129 mg/dL   Borderline high: 130-159 mg/dL   High: 160-189 mg/dL   Very high: >= 190 mg/dL     Non HDL Cholesterol 09/10/2021 66  <130 mg/dL Final    0-19 years:  Desirable:          Less than 120 mg/dL  Borderline high:   120-144 mg/dL  High:                   Greater than or equal to 145 mg/dL    20 years and older:  Desirable:          130 mg/dL  Above Desirable: 130-159 mg/dL  Borderline high:   160-189 mg/dL  High:               190-219 mg/dL  Very high:     Greater than or equal to 220 mg/dL     Patient Fasting > 8hrs? 09/10/2021 Yes   Final     ALT 09/10/2021 51  0 - 70 U/L Final     Sodium 09/10/2021 138  133 - 144 mmol/L Final     Potassium 09/10/2021 4.6  3.4 - 5.3 mmol/L Final     Chloride 09/10/2021 107  94 - 109 mmol/L Final     Carbon Dioxide (CO2) 09/10/2021 28  20 - 32 mmol/L Final     Anion Gap 09/10/2021 3  3 - 14 mmol/L Final     Urea Nitrogen 09/10/2021 21  7 - 30 mg/dL Final     Creatinine 09/10/2021 1.09  0.66 - 1.25 mg/dL Final     Calcium 09/10/2021 8.9  8.5 - 10.1 mg/dL Final     Glucose 09/10/2021 111 (A) 70 - 99 mg/dL Final     GFR Estimate 09/10/2021 68  >60 mL/min/1.73m2 Final    As of July 11, 2021, eGFR is calculated by the CKD-EPI creatinine equation, without race adjustment. eGFR can be influenced by muscle mass, exercise, and diet. The reported eGFR is an estimation only and is only applicable if the renal function is stable.     Hemoglobin A1C 09/10/2021 5.8 (A) 0.0 - 5.6 % Final    Normal <5.7%   Prediabetes 5.7-6.4%    Diabetes 6.5% or higher     Note: Adopted from ADA consensus guidelines.

## 2022-05-11 ENCOUNTER — TELEPHONE (OUTPATIENT)
Dept: FAMILY MEDICINE | Facility: CLINIC | Age: 72
End: 2022-05-11
Payer: MEDICARE

## 2022-05-11 NOTE — TELEPHONE ENCOUNTER
Pt is looking for provider's interpretation on labs  From 4/25/22.      Routing to provider.        Brandi Saldaña RN  [facility]

## 2022-05-11 NOTE — TELEPHONE ENCOUNTER
Patient calling for the lab results from 4/25/2022.  [facility]  2nd Floor  Primary Care

## 2022-09-12 ENCOUNTER — OFFICE VISIT (OUTPATIENT)
Dept: FAMILY MEDICINE | Facility: CLINIC | Age: 72
End: 2022-09-12
Payer: MEDICARE

## 2022-09-12 VITALS
TEMPERATURE: 97.4 F | HEIGHT: 67 IN | WEIGHT: 202.4 LBS | BODY MASS INDEX: 31.77 KG/M2 | RESPIRATION RATE: 12 BRPM | OXYGEN SATURATION: 98 % | HEART RATE: 53 BPM | SYSTOLIC BLOOD PRESSURE: 138 MMHG | DIASTOLIC BLOOD PRESSURE: 74 MMHG

## 2022-09-12 DIAGNOSIS — M25.532 PAIN IN BOTH WRISTS: ICD-10-CM

## 2022-09-12 DIAGNOSIS — Z12.5 PROSTATE CANCER SCREENING: ICD-10-CM

## 2022-09-12 DIAGNOSIS — J01.90 ACUTE SINUSITIS WITH SYMPTOMS > 10 DAYS: ICD-10-CM

## 2022-09-12 DIAGNOSIS — Z00.00 ENCOUNTER FOR MEDICARE ANNUAL WELLNESS EXAM: Primary | ICD-10-CM

## 2022-09-12 DIAGNOSIS — I10 ESSENTIAL HYPERTENSION WITH GOAL BLOOD PRESSURE LESS THAN 140/90: ICD-10-CM

## 2022-09-12 DIAGNOSIS — I70.202 ATHEROSCLEROSIS OF NATIVE ARTERY OF LEFT LOWER EXTREMITY, WITH UNSPECIFIED PRESENCE OF CLINICAL MANIFESTATION (H): ICD-10-CM

## 2022-09-12 DIAGNOSIS — Z23 NEED FOR VACCINATION: ICD-10-CM

## 2022-09-12 DIAGNOSIS — M25.531 PAIN IN BOTH WRISTS: ICD-10-CM

## 2022-09-12 DIAGNOSIS — I70.0 ATHEROSCLEROSIS OF AORTA (H): ICD-10-CM

## 2022-09-12 DIAGNOSIS — R73.01 IMPAIRED FASTING GLUCOSE: ICD-10-CM

## 2022-09-12 DIAGNOSIS — N52.9 ERECTILE DYSFUNCTION, UNSPECIFIED ERECTILE DYSFUNCTION TYPE: ICD-10-CM

## 2022-09-12 LAB
ALT SERPL W P-5'-P-CCNC: 42 U/L (ref 0–70)
ANION GAP SERPL CALCULATED.3IONS-SCNC: 4 MMOL/L (ref 3–14)
BUN SERPL-MCNC: 23 MG/DL (ref 7–30)
CALCIUM SERPL-MCNC: 9.3 MG/DL (ref 8.5–10.1)
CHLORIDE BLD-SCNC: 105 MMOL/L (ref 94–109)
CHOLEST SERPL-MCNC: 122 MG/DL
CO2 SERPL-SCNC: 30 MMOL/L (ref 20–32)
CREAT SERPL-MCNC: 1 MG/DL (ref 0.66–1.25)
FASTING STATUS PATIENT QL REPORTED: YES
GFR SERPL CREATININE-BSD FRML MDRD: 80 ML/MIN/1.73M2
GLUCOSE BLD-MCNC: 118 MG/DL (ref 70–99)
HBA1C MFR BLD: 5.9 % (ref 0–5.6)
HDLC SERPL-MCNC: 53 MG/DL
LDLC SERPL CALC-MCNC: 43 MG/DL
NONHDLC SERPL-MCNC: 69 MG/DL
POTASSIUM BLD-SCNC: 4.9 MMOL/L (ref 3.4–5.3)
PSA SERPL-MCNC: 3.32 UG/L (ref 0–4)
SODIUM SERPL-SCNC: 139 MMOL/L (ref 133–144)
TRIGL SERPL-MCNC: 128 MG/DL

## 2022-09-12 PROCEDURE — G0439 PPPS, SUBSEQ VISIT: HCPCS | Performed by: FAMILY MEDICINE

## 2022-09-12 PROCEDURE — G0103 PSA SCREENING: HCPCS | Performed by: FAMILY MEDICINE

## 2022-09-12 PROCEDURE — 83036 HEMOGLOBIN GLYCOSYLATED A1C: CPT | Performed by: FAMILY MEDICINE

## 2022-09-12 PROCEDURE — 99214 OFFICE O/P EST MOD 30 MIN: CPT | Mod: 25 | Performed by: FAMILY MEDICINE

## 2022-09-12 PROCEDURE — 80061 LIPID PANEL: CPT | Performed by: FAMILY MEDICINE

## 2022-09-12 PROCEDURE — 90662 IIV NO PRSV INCREASED AG IM: CPT | Performed by: FAMILY MEDICINE

## 2022-09-12 PROCEDURE — 84460 ALANINE AMINO (ALT) (SGPT): CPT | Performed by: FAMILY MEDICINE

## 2022-09-12 PROCEDURE — G0008 ADMIN INFLUENZA VIRUS VAC: HCPCS | Performed by: FAMILY MEDICINE

## 2022-09-12 PROCEDURE — 80048 BASIC METABOLIC PNL TOTAL CA: CPT | Performed by: FAMILY MEDICINE

## 2022-09-12 PROCEDURE — 36415 COLL VENOUS BLD VENIPUNCTURE: CPT | Performed by: FAMILY MEDICINE

## 2022-09-12 RX ORDER — LISINOPRIL 20 MG/1
20 TABLET ORAL DAILY
Qty: 90 TABLET | Refills: 0 | Status: SHIPPED | OUTPATIENT
Start: 2022-09-12 | End: 2023-01-19

## 2022-09-12 RX ORDER — SILDENAFIL CITRATE 20 MG/1
TABLET ORAL
Qty: 50 TABLET | Refills: 3 | Status: SHIPPED | OUTPATIENT
Start: 2022-09-12 | End: 2023-09-26

## 2022-09-12 RX ORDER — ATORVASTATIN CALCIUM 40 MG/1
40 TABLET, FILM COATED ORAL DAILY
Qty: 90 TABLET | Refills: 1 | Status: SHIPPED | OUTPATIENT
Start: 2022-09-12 | End: 2023-03-24

## 2022-09-12 RX ORDER — ETODOLAC 400 MG
400 TABLET ORAL 2 TIMES DAILY WITH MEALS
Qty: 60 TABLET | Refills: 1 | Status: SHIPPED | OUTPATIENT
Start: 2022-09-12 | End: 2024-06-04

## 2022-09-12 ASSESSMENT — ENCOUNTER SYMPTOMS
PALPITATIONS: 0
HEMATOCHEZIA: 0
MYALGIAS: 0
NAUSEA: 0
JOINT SWELLING: 0
NERVOUS/ANXIOUS: 0
DIZZINESS: 0
SORE THROAT: 0
FEVER: 0
FREQUENCY: 0
ARTHRALGIAS: 0
ABDOMINAL PAIN: 0
CONSTIPATION: 0
SHORTNESS OF BREATH: 0
HEADACHES: 0
PARESTHESIAS: 0
COUGH: 1
EYE PAIN: 0
HEARTBURN: 0
DIARRHEA: 0
DYSURIA: 0
HEMATURIA: 0
WEAKNESS: 0
CHILLS: 0

## 2022-09-12 ASSESSMENT — ACTIVITIES OF DAILY LIVING (ADL): CURRENT_FUNCTION: NO ASSISTANCE NEEDED

## 2022-09-12 ASSESSMENT — PAIN SCALES - GENERAL: PAINLEVEL: NO PAIN (0)

## 2022-09-12 NOTE — PROGRESS NOTES
"SUBJECTIVE:   Florencio Doshi is a 72 year old male who presents for Preventive Visit.      Patient has been advised of split billing requirements and indicates understanding: Yes     Are you in the first 12 months of your Medicare coverage?  No    Healthy Habits:     In general, how would you rate your overall health?  Good    Frequency of exercise:  2-3 days/week    Duration of exercise:  45-60 minutes    Do you usually eat at least 4 servings of fruit and vegetables a day, include whole grains    & fiber and avoid regularly eating high fat or \"junk\" foods?  No    Taking medications regularly:  Yes    Medication side effects:  Other    Ability to successfully perform activities of daily living:  No assistance needed    Home Safety:  No safety concerns identified    Hearing Impairment:  No hearing concerns    In the past 6 months, have you been bothered by leaking of urine?  No    In general, how would you rate your overall mental or emotional health?  Excellent      PHQ-2 Total Score: 0    Additional concerns today:  Yes    Do you feel safe in your environment? Yes    Have you ever done Advance Care Planning? (For example, a Health Directive, POLST, or a discussion with a medical provider or your loved ones about your wishes): Yes, patient states has an Advance Care Planning document and will bring a copy to the clinic.       Fall risk  Fallen 2 or more times in the past year?: No  Any fall with injury in the past year?: No    Cognitive Screening   1) Repeat 3 items (Leader, Season, Table)    2) Clock draw: NORMAL  3) 3 item recall: Recalls 3 objects  Results: 3 items recalled: COGNITIVE IMPAIRMENT LESS LIKELY    Mini-CogTM Copyright JR Sin. Licensed by the author for use in [facility]; reprinted with permission ([email or web address]). All rights reserved.      Do you have sleep apnea, excessive snoring or daytime drowsiness?: no    Past medical, family, and social histories, medications, and allergies are " reviewed and updated in Epic.     Social History     Tobacco Use     Smoking status: Never Smoker     Smokeless tobacco: Never Used   Substance Use Topics     Alcohol use: Yes     Comment: occassionally     If you drink alcohol do you typically have >3 drinks per day or >7 drinks per week? No    Alcohol Use 9/12/2022   Prescreen: >3 drinks/day or >7 drinks/week? No   Prescreen: >3 drinks/day or >7 drinks/week? -       Hyperlipidemia Follow-Up      Are you regularly taking any medication or supplement to lower your cholesterol?   Yes- atorvaststin    Are you having muscle aches or other side effects that you think could be caused by your cholesterol lowering medication?  No    Hypertension Follow-up      Do you check your blood pressure regularly outside of the clinic? No     Are you following a low salt diet? No asdded salt    Are your blood pressures ever more than 140 on the top number (systolic) OR more   than 90 on the bottom number (diastolic), for example 140/90?       Current providers sharing in care for this patient include:   Patient Care Team:  Ant Wright MD as PCP - General  Ant Wright MD as Assigned PCP  Ant Wright MD as MD (Family Practice)  Yuni Robbins MD as MD (Internal Medicine)    The following health maintenance items are reviewed in Epic and correct as of today:  Health Maintenance Due   Topic Date Due     INFLUENZA VACCINE (1) 09/01/2022         Review of Systems   Constitutional: Negative for chills and fever.   HENT: Negative for congestion, ear pain, hearing loss and sore throat.    Eyes: Negative for pain and visual disturbance.   Respiratory: Positive for cough. Negative for shortness of breath.    Cardiovascular: Negative for chest pain, palpitations and peripheral edema.   Gastrointestinal: Negative for abdominal pain, constipation, diarrhea, heartburn, hematochezia and nausea.   Genitourinary: Positive for urgency. Negative for dysuria, frequency, genital  "sores, hematuria, impotence and penile discharge.   Musculoskeletal: Negative for arthralgias, joint swelling and myalgias.   Skin: Negative for rash.   Neurological: Negative for dizziness, weakness, headaches and paresthesias.   Psychiatric/Behavioral: Negative for mood changes. The patient is not nervous/anxious.      1.5 months of sinus symptoms, nasal pressure and coughing ever [place]    OBJECTIVE:   BP (!) 154/71 (BP Location: Left arm, Patient Position: Sitting, Cuff Size: Adult Large)   Pulse 53   Temp 97.4  F (36.3  C) (Tympanic)   Resp 12   Ht 1.702 m (5' 7\")   Wt 91.8 kg (202 lb 6.4 oz)   SpO2 98%   BMI 31.70 kg/m   Estimated body mass index is 31.7 kg/m  as calculated from the following:    Height as of this encounter: 1.702 m (5' 7\").    Weight as of this encounter: 91.8 kg (202 lb 6.4 oz).  Physical Exam  GENERAL: healthy, alert and no distress  EYES: Eyes grossly normal to inspection, PERRL and conjunctivae and sclerae normal  HENT: ear canals and TM's normal, nose and mouth without ulcers or lesions  NECK: no adenopathy, no asymmetry, masses, or scars and thyroid normal to palpation  RESP: lungs clear to auscultation - no rales, rhonchi or wheezes  CV: regular rate and rhythm, normal S1 S2, no S3 or S4, no murmur, click or rub, no peripheral edema and peripheral pulses strong  ABDOMEN: soft, nontender, no hepatosplenomegaly, no masses and bowel sounds normal   (male): hydrocele RT, otherwise normal male genitalia without lesions or urethral discharge, no hernia  MS: no gross musculoskeletal defects noted, no edema  SKIN: no suspicious lesions or rashes  NEURO: Normal strength and tone, mentation intact and speech normal  PSYCH: mentation appears normal, affect normal/bright        ASSESSMENT / PLAN:   (Z00.00) Encounter for Medicare annual wellness exam  (primary encounter diagnosis)  Comment: Negative screening exam; up-to-date on preventive " "services.   Plan: INFLUENZA, QUAD, HIGH DOSE, PF, 65YR + (FLUZONE        HD), PSA, screen        follow up in 1 year    (I70.202) Atherosclerosis of native artery of left lower extremity, with unspecified presence of clinical manifestation (H)  (I70.0) Atherosclerosis of aorta (H)  Comment: both diagnoses clinically stable. On a high-intensity statin.  Plan: atorvastatin (LIPITOR) 40 MG tablet, Lipid         panel reflex to direct LDL Non-fasting, ALT        Recheck lipids in 6-12 months     (I10) Essential hypertension with goal blood pressure less than 140/90  Comment: well controlled   Plan: lisinopril (ZESTRIL) 20 MG tablet, Basic         metabolic panel            (N52.9) Erectile dysfunction, unspecified erectile dysfunction type  Comment: refill request   Plan: sildenafil (REVATIO) 20 MG tablet            (M25.531,  M25.532) Pain in both wrists  Comment:   Plan: etodolac (LODINE) 400 MG tablet            (J01.90) Acute sinusitis with symptoms > 10 days  Comment: subacute cough.  Plan: amoxicillin-clavulanate (AUGMENTIN) 875-125 MG         tablet        follow up as symptoms persist past 2 weeks.    (R73.01) Impaired fasting glucose  Comment:   Plan: Hemoglobin A1c            (Z12.5) Prostate cancer screening  Comment: indications for screening discussed with the patient   Plan: PSA, screen            (Z23) Need for vaccination  Comment:   Plan: INFLUENZA, QUAD, HIGH DOSE, PF, 65YR + (FLUZONE        HD)                  COUNSELING:    Estimated body mass index is 31.7 kg/m  as calculated from the following:    Height as of this encounter: 1.702 m (5' 7\").    Weight as of this encounter: 91.8 kg (202 lb 6.4 oz).    Weight management plan: Discussed healthy diet and exercise guidelines as summarized in the AVS    He reports that he has never smoked. He has never used smokeless tobacco.      Appropriate preventive services were discussed with this patient, including applicable screening as appropriate for " cardiovascular disease, diabetes, osteopenia/osteoporosis, and glaucoma.  As appropriate for age/gender, discussed screening for colorectal cancer, prostate cancer, breast cancer, and cervical cancer. Checklist reviewing preventive services available has been given to the patient.    Reviewed patients plan of care and provided an AVS. The Basic Care Plan (routine screening as documented in Health Maintenance) for Florencio meets the Care Plan requirement. This Care Plan has been established and reviewed with the Patient.    Counseling Resources:  ATP IV Guidelines  Pooled Cohorts Equation Calculator  Breast Cancer Risk Calculator  Breast Cancer: Medication to Reduce Risk  FRAX Risk Assessment  ICSI Preventive Guidelines  Dietary Guidelines for Americans, 2010  USDA's MyPlate  ASA Prophylaxis  Lung CA Screening    Ant Wright MD  [facility]    Identified Health Risks:

## 2022-09-12 NOTE — NURSING NOTE
Prior to immunization administration, verified patients identity using patient s name and date of birth. Please see Immunization Activity for additional information.     Screening Questionnaire for Adult Immunization    Are you sick today?   No   Do you have allergies to medications, food, a vaccine component or latex?   No   Have you ever had a serious reaction after receiving a vaccination?   No   Do you have a long-term health problem with heart, lung, kidney, or metabolic disease (e.g., diabetes), asthma, a blood disorder, no spleen, complement component deficiency, a cochlear implant, or a spinal fluid leak?  Are you on long-term aspirin therapy?   No   Do you have cancer, leukemia, HIV/AIDS, or any other immune system problem?   No   Do you have a parent, brother, or sister with an immune system problem?   No   In the past 3 months, have you taken medications that affect  your immune system, such as prednisone, other steroids, or anticancer drugs; drugs for the treatment of rheumatoid arthritis, Crohn s disease, or psoriasis; or have you had radiation treatments?   No   Have you had a seizure, or a brain or other nervous system problem?   No   During the past year, have you received a transfusion of blood or blood    products, or been given immune (gamma) globulin or antiviral drug?   No   For women: Are you pregnant or is there a chance you could become       pregnant during the next month?   No   Have you received any vaccinations in the past 4 weeks?   No     Immunization questionnaire answers were all negative.        Per orders of Dr. Wright, injection of Fluzone HD given by Carolann Buckner RN. Patient instructed to remain in clinic for 15 minutes afterwards, and to report any adverse reaction to me immediately.       Screening performed by Carolann Buckner RN on 9/12/2022 at 9:46 AM.

## 2022-09-12 NOTE — PATIENT INSTRUCTIONS
Patient Education   Personalized Prevention Plan  You are due for the preventive services outlined below.  Your care team is available to assist you in scheduling these services.  If you have already completed any of these items, please share that information with your care team to update in your medical record.  Health Maintenance Due   Topic Date Due     Flu Vaccine (1) 09/01/2022     ANNUAL REVIEW OF HM ORDERS  09/10/2022     FALL RISK ASSESSMENT  09/10/2022     Annual Wellness Visit  09/10/2022

## 2023-03-24 ENCOUNTER — OFFICE VISIT (OUTPATIENT)
Dept: FAMILY MEDICINE | Facility: CLINIC | Age: 73
End: 2023-03-24
Payer: MEDICARE

## 2023-03-24 VITALS
OXYGEN SATURATION: 97 % | TEMPERATURE: 97.3 F | BODY MASS INDEX: 32.77 KG/M2 | HEART RATE: 55 BPM | DIASTOLIC BLOOD PRESSURE: 78 MMHG | SYSTOLIC BLOOD PRESSURE: 146 MMHG | RESPIRATION RATE: 16 BRPM | WEIGHT: 208.8 LBS | HEIGHT: 67 IN

## 2023-03-24 DIAGNOSIS — Z00.00 ENCOUNTER FOR MEDICARE ANNUAL WELLNESS EXAM: Primary | ICD-10-CM

## 2023-03-24 DIAGNOSIS — I70.0 ATHEROSCLEROSIS OF AORTA (H): ICD-10-CM

## 2023-03-24 DIAGNOSIS — I70.202 ATHEROSCLEROSIS OF NATIVE ARTERY OF LEFT LOWER EXTREMITY, WITH UNSPECIFIED PRESENCE OF CLINICAL MANIFESTATION (H): ICD-10-CM

## 2023-03-24 DIAGNOSIS — I10 ESSENTIAL HYPERTENSION WITH GOAL BLOOD PRESSURE LESS THAN 140/90: ICD-10-CM

## 2023-03-24 LAB
ALT SERPL W P-5'-P-CCNC: 62 U/L (ref 0–70)
CHOLEST SERPL-MCNC: 118 MG/DL
CREAT SERPL-MCNC: 1.05 MG/DL (ref 0.66–1.25)
FASTING STATUS PATIENT QL REPORTED: YES
GFR SERPL CREATININE-BSD FRML MDRD: 75 ML/MIN/1.73M2
HDLC SERPL-MCNC: 50 MG/DL
LDLC SERPL CALC-MCNC: 46 MG/DL
NONHDLC SERPL-MCNC: 68 MG/DL
POTASSIUM BLD-SCNC: 4.8 MMOL/L (ref 3.4–5.3)
TRIGL SERPL-MCNC: 111 MG/DL

## 2023-03-24 PROCEDURE — 36415 COLL VENOUS BLD VENIPUNCTURE: CPT | Performed by: FAMILY MEDICINE

## 2023-03-24 PROCEDURE — 84460 ALANINE AMINO (ALT) (SGPT): CPT | Performed by: FAMILY MEDICINE

## 2023-03-24 PROCEDURE — 99207 PR ANNUAL WELLNESS VISIT, PPS, SUBSEQUENT STAT: CPT | Performed by: FAMILY MEDICINE

## 2023-03-24 PROCEDURE — 99214 OFFICE O/P EST MOD 30 MIN: CPT | Performed by: FAMILY MEDICINE

## 2023-03-24 PROCEDURE — 84132 ASSAY OF SERUM POTASSIUM: CPT | Performed by: FAMILY MEDICINE

## 2023-03-24 PROCEDURE — 80061 LIPID PANEL: CPT | Performed by: FAMILY MEDICINE

## 2023-03-24 PROCEDURE — 82565 ASSAY OF CREATININE: CPT | Performed by: FAMILY MEDICINE

## 2023-03-24 RX ORDER — LISINOPRIL 30 MG/1
30 TABLET ORAL DAILY
Qty: 90 TABLET | Refills: 1 | Status: SHIPPED | OUTPATIENT
Start: 2023-03-24 | End: 2023-09-26

## 2023-03-24 RX ORDER — ATORVASTATIN CALCIUM 40 MG/1
40 TABLET, FILM COATED ORAL DAILY
Qty: 90 TABLET | Refills: 1 | Status: SHIPPED | OUTPATIENT
Start: 2023-03-24 | End: 2023-09-26

## 2023-03-24 ASSESSMENT — ENCOUNTER SYMPTOMS
CHILLS: 0
COUGH: 0
HEADACHES: 0
WEAKNESS: 0
NERVOUS/ANXIOUS: 0
NAUSEA: 0
HEARTBURN: 0
PARESTHESIAS: 0
ARTHRALGIAS: 0
SHORTNESS OF BREATH: 0
HEMATURIA: 0
CONSTIPATION: 0
FEVER: 0
FREQUENCY: 0
DIZZINESS: 0
MYALGIAS: 0
ABDOMINAL PAIN: 0
DIARRHEA: 0
JOINT SWELLING: 0
DYSURIA: 0
EYE PAIN: 0
PALPITATIONS: 0
SORE THROAT: 0
HEMATOCHEZIA: 0

## 2023-03-24 ASSESSMENT — ACTIVITIES OF DAILY LIVING (ADL): CURRENT_FUNCTION: NO ASSISTANCE NEEDED

## 2023-03-24 NOTE — PROGRESS NOTES
"SUBJECTIVE:   Florencio is a 73 year old who presents for Preventive Visit  Patient has been advised of split billing requirements and indicates understanding: Yes  Are you in the first 12 months of your Medicare coverage?  No    Healthy Habits:     In general, how would you rate your overall health?  Good    Frequency of exercise:  2-3 days/week    Duration of exercise:  45-60 minutes    Do you usually eat at least 4 servings of fruit and vegetables a day, include whole grains    & fiber and avoid regularly eating high fat or \"junk\" foods?  No    Taking medications regularly:  Yes    Medication side effects:  Other    Ability to successfully perform activities of daily living:  No assistance needed    Home Safety:  No safety concerns identified    Hearing Impairment:  No hearing concerns    In the past 6 months, have you been bothered by leaking of urine?  No    In general, how would you rate your overall mental or emotional health?  Good      PHQ-2 Total Score: 0    Additional concerns today:  No      Have you ever done Advance Care Planning? (For example, a Health Directive, POLST, or a discussion with a medical provider or your loved ones about your wishes): No, advance care planning information given to patient to review.  Patient plans to discuss their wishes with loved ones or provider.      Fall risk  Fallen 2 or more times in the past year?: No  Any fall with injury in the past year?: No    Cognitive Screening   1) Repeat 3 items (Leader, Season, Table)    2) Clock draw:   NORMAL  3) 3 item recall:   Recalls 2 objects   Results: NORMAL clock, 1-2 items recalled: COGNITIVE IMPAIRMENT LESS LIKELY    Mini-CogTM Copyright JR Sin. Licensed by the author for use in [facility]; reprinted with permission ([email or web address]). All rights reserved.         Social History     Tobacco Use     Smoking status: Never     Smokeless tobacco: Never   Substance Use Topics     Alcohol use: Yes     Comment: occassionally "         Alcohol Use 3/24/2023   Prescreen: >3 drinks/day or >7 drinks/week? No   Do you have a current opioid prescription? No  Do you use any other controlled substances or medications that are not prescribed by a provider? None    Hyperlipidemia Follow-Up      Are you regularly taking any medication or supplement to lower your cholesterol?   Yes- atorvastatin    Are you having muscle aches or other side effects that you think could be caused by your cholesterol lowering medication?  No    Hypertension Follow-up      Do you check your blood pressure regularly outside of the clinic? No     Are you following a low salt diet? No added salt    Are your blood pressures ever more than 140 on the top number (systolic) OR more   than 90 on the bottom number (diastolic), for example 140/90? n/a      Current providers sharing in care for this patient include:   Patient Care Team:  Ant Wright MD as PCP - General  Ant Wright MD as Assigned PCP  Ant Wright MD as MD (Family Practice)  Yuni Robbins MD as MD (Internal Medicine)    The following health maintenance items are reviewed in Epic and correct as of today:  Health Maintenance   Topic Date Due     LIPID  09/12/2023     ANNUAL REVIEW OF HM ORDERS  09/12/2023     MEDICARE ANNUAL WELLNESS VISIT  03/24/2024     FALL RISK ASSESSMENT  03/24/2024     COLORECTAL CANCER SCREENING  10/19/2027     ADVANCE CARE PLANNING  03/24/2028     DTAP/TDAP/TD IMMUNIZATION (4 - Td or Tdap) 06/03/2029     HEPATITIS C SCREENING  Completed     PHQ-2 (once per calendar year)  Completed     INFLUENZA VACCINE  Completed     Pneumococcal Vaccine: 65+ Years  Completed     ZOSTER IMMUNIZATION  Completed     AORTIC ANEURYSM SCREENING (SYSTEM ASSIGNED)  Completed     COVID-19 Vaccine  Completed     IPV IMMUNIZATION  Aged Out     MENINGITIS IMMUNIZATION  Aged Out         Review of Systems   Constitutional: Negative for chills and fever.   HENT: Negative for congestion, hearing  "loss and sore throat.    Eyes: Negative for pain and visual disturbance.   Respiratory: Negative for cough and shortness of breath.    Cardiovascular: Negative for chest pain, palpitations and peripheral edema.   Gastrointestinal: Negative for abdominal pain, constipation, diarrhea, heartburn, hematochezia and nausea.   Genitourinary: Positive for impotence. Negative for dysuria, frequency, genital sores, hematuria, penile discharge and urgency.   Musculoskeletal: Negative for arthralgias, joint swelling and myalgias.   Skin: Negative for rash.   Neurological: Negative for dizziness, weakness, headaches and paresthesias.   Psychiatric/Behavioral: Negative for mood changes. The patient is not nervous/anxious.          OBJECTIVE:   BP (!) 146/78 (BP Location: Left arm, Patient Position: Chair, Cuff Size: Adult Large)   Pulse 55   Temp 97.3  F (36.3  C) (Oral)   Resp 16   Ht 1.702 m (5' 7\")   Wt 94.7 kg (208 lb 12.8 oz)   SpO2 97%   BMI 32.70 kg/m   Estimated body mass index is 32.7 kg/m  as calculated from the following:    Height as of this encounter: 1.702 m (5' 7\").    Weight as of this encounter: 94.7 kg (208 lb 12.8 oz).  Physical Exam  GENERAL: healthy, alert and no distress  EYES: Eyes grossly normal to inspection, PERRL and conjunctivae and sclerae normal  HENT: ear canals and TM's normal, nose and mouth without ulcers or lesions  NECK: no adenopathy, no asymmetry, masses, or scars and thyroid normal to palpation  RESP: lungs clear to auscultation - no rales, rhonchi or wheezes  CV: regular rate and rhythm, normal S1 S2, no S3 or S4, no murmur, click or rub, no peripheral edema and peripheral pulses strong  ABDOMEN: soft, nontender, no hepatosplenomegaly, no masses and bowel sounds normal  MS: no gross musculoskeletal defects noted, no edema  SKIN: no suspicious lesions or rashes  NEURO: Normal strength and tone, mentation intact and speech normal.   PSYCH: mentation appears normal, affect normal/bright "     Office Visit on 09/12/2022   Component Date Value Ref Range Status     Cholesterol 09/12/2022 122  <200 mg/dL Final     Triglycerides 09/12/2022 128  <150 mg/dL Final     Direct Measure HDL 09/12/2022 53  >=40 mg/dL Final     LDL Cholesterol Calculated 09/12/2022 43  <=100 mg/dL Final     Non HDL Cholesterol 09/12/2022 69  <130 mg/dL Final     Patient Fasting > 8hrs? 09/12/2022 Yes   Final     ALT 09/12/2022 42  0 - 70 U/L Final     Sodium 09/12/2022 139  133 - 144 mmol/L Final     Potassium 09/12/2022 4.9  3.4 - 5.3 mmol/L Final     Chloride 09/12/2022 105  94 - 109 mmol/L Final     Carbon Dioxide (CO2) 09/12/2022 30  20 - 32 mmol/L Final     Anion Gap 09/12/2022 4  3 - 14 mmol/L Final     Urea Nitrogen 09/12/2022 23  7 - 30 mg/dL Final     Creatinine 09/12/2022 1.00  0.66 - 1.25 mg/dL Final     Calcium 09/12/2022 9.3  8.5 - 10.1 mg/dL Final     Glucose 09/12/2022 118 (H)  70 - 99 mg/dL Final     GFR Estimate 09/12/2022 80  >60 mL/min/1.73m2 Final    Effective December 21, 2021 eGFRcr in adults is calculated using the 2021 CKD-EPI creatinine equation which includes age and gender (Toney et al., NE, DOI: 10.1056/ZDVGah9849136)     Hemoglobin A1C 09/12/2022 5.9 (H)  0.0 - 5.6 % Final    Normal <5.7%   Prediabetes 5.7-6.4%    Diabetes 6.5% or higher     Note: Adopted from ADA consensus guidelines.     Prostate Specific Antigen Screen 09/12/2022 3.32  0.00 - 4.00 ug/L Final        ASSESSMENT / PLAN:   (Z00.00) Encounter for Medicare annual wellness exam  (primary encounter diagnosis)  Comment: Negative screening exam; up-to-date on preventive services.   Plan: follow up in 1 year    (I70.202) Atherosclerosis of native artery of left lower extremity, with unspecified presence of clinical manifestation (H)  (I70.0) Atherosclerosis of aorta (H)  Comment: Fasting. historically at goal. He is on a high-intensity statin.  Plan: Lipid panel reflex to direct LDL Non-fasting,         ALT, atorvastatin (LIPITOR) 40 MG  "tablet        Recheck lipids in 6-12 months     (I10) Essential hypertension with goal blood pressure less than 140/90  Comment: uncontrolled.  Plan: Creatinine, Potassium, lisinopril (ZESTRIL) 30         MG tablet        Increase lisinopril from 20 mg to 30 mg. Return in about 2 months (around 6/5/2023) for blood pressure check.          COUNSELING:  Reviewed preventive health counseling, as reflected in patient instructions  Special attention given to:       Regular exercise      BMI:   Estimated body mass index is 32.7 kg/m  as calculated from the following:    Height as of this encounter: 1.702 m (5' 7\").    Weight as of this encounter: 94.7 kg (208 lb 12.8 oz).   Weight management plan: Exercise routine reviewed and approved. Weight graph reviewed with the patient.    The patient reports he has been lifting weights, riding a stationary bike, and using the treadmill about 4x per week.    He reports that he has never smoked. He has never used smokeless tobacco.      Appropriate preventive services were discussed with this patient, including applicable screening as appropriate for cardiovascular disease, diabetes, osteopenia/osteoporosis, and glaucoma.  As appropriate for age/gender, discussed screening for colorectal cancer, prostate cancer, breast cancer, and cervical cancer. Checklist reviewing preventive services available has been given to the patient.    Reviewed patients plan of care and provided an AVS. The Basic Care Plan (routine screening as documented in Health Maintenance) for Florencio meets the Care Plan requirement. This Care Plan has been established and reviewed with the Patient.      Ant Wright MD  [facility]    This document serves as a record of the services and decisions personally performed and made by Dr. Wright. It was created on his behalf by Lawrence Aviles, a trained medical scribe. The creation of this document is based the provider's statements to the " medical scribe.  Lawrence Aviles,  10:38 AM

## 2023-03-24 NOTE — PATIENT INSTRUCTIONS
At [facility], we strive to deliver an exceptional experience to you, every time we see you. If you receive a survey, please complete it as we do value your feedback.  If you have MyChart, you can expect to receive results automatically within 24 hours of their completion.  Your provider will send a note interpreting your results as well.   If you do not have MyChart, you should receive your results in about a week by mail.    Your care team:                            Family Medicine Internal Medicine   MD Ortiz Carson MD Shantel Branch-Fleming, MD Srinivasa Vaka, MD Katya Belousova, PAPRISCILLA Ojeda CNP, MD ([facility]   Blaine Le, MD Celia Fung MD Amelia Massimini APRN CNP   Gayatri Waller, APRN CNP MD Andres Simmons MD          Clinic hours: Monday - Thursday 7 am-6 pm; Fridays 7 am-5 pm.   Urgent care: Monday - Friday 10 am- 8 pm; Saturday and Sunday 9 am-5 pm.    Clinic: [phone]       [facility]: Monday - Thursday 8 am - 7 pm; Friday 8 am - 6 pm  [facility]: [phone]     Patient Education   Personalized Prevention Plan  You are due for the preventive services outlined below.  Your care team is available to assist you in scheduling these services.  If you have already completed any of these items, please share that information with your care team to update in your medical record.  Health Maintenance Due   Topic Date Due    PHQ-2 (once per calendar year)  01/01/2023

## 2023-04-10 ENCOUNTER — TELEPHONE (OUTPATIENT)
Dept: FAMILY MEDICINE | Facility: CLINIC | Age: 73
End: 2023-04-10
Payer: MEDICARE

## 2023-04-21 ENCOUNTER — DOCUMENTATION ONLY (OUTPATIENT)
Dept: OTHER | Facility: CLINIC | Age: 73
End: 2023-04-21
Payer: MEDICARE

## 2023-05-19 ENCOUNTER — VIRTUAL VISIT (OUTPATIENT)
Dept: FAMILY MEDICINE | Facility: CLINIC | Age: 73
End: 2023-05-19
Payer: MEDICARE

## 2023-05-19 DIAGNOSIS — N20.0 CALCULUS OF RENAL PELVIS: ICD-10-CM

## 2023-05-19 DIAGNOSIS — N20.1 OBSTRUCTION OF LEFT URETEROPELVIC JUNCTION DUE TO STONE: Primary | ICD-10-CM

## 2023-05-19 DIAGNOSIS — Z96.0 STATUS POST CYSTOSCOPY WITH URETERAL STENT PLACEMENT: ICD-10-CM

## 2023-05-19 DIAGNOSIS — Z79.02 LONG TERM (CURRENT) USE OF ANTITHROMBOTICS/ANTIPLATELETS: ICD-10-CM

## 2023-05-19 PROCEDURE — 99441 PR PHYSICIAN TELEPHONE EVALUATION 5-10 MIN: CPT | Mod: 95 | Performed by: FAMILY MEDICINE

## 2023-05-19 RX ORDER — TAMSULOSIN HYDROCHLORIDE 0.4 MG/1
0.4 CAPSULE ORAL DAILY
COMMUNITY
End: 2024-09-26

## 2023-05-19 NOTE — PROGRESS NOTES
Florencio is a 73 year old who is being evaluated via a billable telephone visit.      What phone number would you like to be contacted at?    [phone]      How would you like to obtain your AVS? Mail a copy  Distant Location (provider location):  On-site    Assessment & Plan     (N20.1) Obstruction of left ureteropelvic junction due to stone, 5mm  (primary encounter diagnosis)  (N20.0) Calculus of left renal pelvis, 9mm  (Z96.0) Status post cystoscopy with ureteral stent placement  Comment: Since he is pain free, he is fine until he has his next procedure.  Plan: Urology office will contact him when they are able to arrange for his lithotripsy(?) and stone extraction procedure.    (Z79.02) Long term (current) use of antiplatelet medication  Comment: Aspirin. The patient is noting continued hematuria from his stones and/or the stent.  Plan: He can go ahead and stop the aspirin for now. We can readdress this once the stone issue is resolved.      33 minutes spent by me on the date of the encounter doing chart review, review of outside records, review of test results, patient visit and documentation     Return in 17 days (on 6/5/2023) for blood pressure check.      Ant Wright MD  [facility]   Florencio is a 73 year old, presenting for the following health issues:  Hospital F/U        5/19/2023    11:39 AM   Additional Questions   Roomed by Tesha ALEX     ED/UC Followup:    Facility: [facility]   Date of visit: 5/15/23  Reason for visit: Left Flank Pain, Back Pain  Current Status: No more pain anymore- had some red urine today     After presenting to [facility] ED for left flank pain, the patient was found to have an obstruction of the uteropelvic junction due to a 5 mm stone, with mild hydronephrosis. There was also a 9 mm non obstructing calyceal stone found in the left kidney lower pole. He had a left ureteral stent placed the next day. The patient recalls a  remote history of kidney stones possibly when he was around 13 years old. The patient also reports red urine earlier today that he believes is associated with his aspirin. He is not currently in any pain.      Review of Systems         Objective           Vitals:  No vitals were obtained today due to virtual visit.    Physical Exam   healthy, alert and no distress  PSYCH: Alert and oriented times 3; coherent speech, normal   rate and volume, able to articulate logical thoughts, able   to abstract reason, no tangential thoughts, no hallucinations   or delusions  His affect is normal and pleasant  RESP: No cough, no audible wheezing, able to talk in full sentences  Remainder of exam unable to be completed due to telephone visits                        Phone call duration: 7 minutes    This document serves as a record of the services and decisions personally performed and made by Dr. Wright. It was created on his behalf by Lawrence Aviles, a trained medical scribe. The creation of this document is based the provider's statements to the medical scribe.  Lawrence Aviles,  1:30 PM

## 2023-05-25 ENCOUNTER — OFFICE VISIT (OUTPATIENT)
Dept: PEDIATRICS | Facility: CLINIC | Age: 73
End: 2023-05-25
Payer: MEDICARE

## 2023-05-25 VITALS
WEIGHT: 201.6 LBS | HEIGHT: 67 IN | OXYGEN SATURATION: 95 % | BODY MASS INDEX: 31.64 KG/M2 | RESPIRATION RATE: 16 BRPM | SYSTOLIC BLOOD PRESSURE: 120 MMHG | TEMPERATURE: 98 F | DIASTOLIC BLOOD PRESSURE: 80 MMHG | HEART RATE: 61 BPM

## 2023-05-25 DIAGNOSIS — I70.0 ATHEROSCLEROSIS OF AORTA (H): ICD-10-CM

## 2023-05-25 DIAGNOSIS — N20.0 KIDNEY STONE: ICD-10-CM

## 2023-05-25 DIAGNOSIS — I10 ESSENTIAL HYPERTENSION WITH GOAL BLOOD PRESSURE LESS THAN 140/90: ICD-10-CM

## 2023-05-25 DIAGNOSIS — R73.01 IMPAIRED FASTING GLUCOSE: ICD-10-CM

## 2023-05-25 DIAGNOSIS — Z01.818 PREOP GENERAL PHYSICAL EXAM: Primary | ICD-10-CM

## 2023-05-25 PROCEDURE — 80048 BASIC METABOLIC PNL TOTAL CA: CPT | Performed by: NURSE PRACTITIONER

## 2023-05-25 PROCEDURE — 36415 COLL VENOUS BLD VENIPUNCTURE: CPT | Performed by: NURSE PRACTITIONER

## 2023-05-25 PROCEDURE — 99214 OFFICE O/P EST MOD 30 MIN: CPT | Performed by: NURSE PRACTITIONER

## 2023-05-25 SDOH — ECONOMIC STABILITY: TRANSPORTATION INSECURITY
IN THE PAST 12 MONTHS, HAS LACK OF TRANSPORTATION KEPT YOU FROM MEETINGS, WORK, OR FROM GETTING THINGS NEEDED FOR DAILY LIVING?: NO

## 2023-05-25 SDOH — ECONOMIC STABILITY: FOOD INSECURITY: WITHIN THE PAST 12 MONTHS, YOU WORRIED THAT YOUR FOOD WOULD RUN OUT BEFORE YOU GOT MONEY TO BUY MORE.: NEVER TRUE

## 2023-05-25 SDOH — ECONOMIC STABILITY: FOOD INSECURITY: WITHIN THE PAST 12 MONTHS, THE FOOD YOU BOUGHT JUST DIDN'T LAST AND YOU DIDN'T HAVE MONEY TO GET MORE.: NEVER TRUE

## 2023-05-25 SDOH — HEALTH STABILITY: PHYSICAL HEALTH: ON AVERAGE, HOW MANY DAYS PER WEEK DO YOU ENGAGE IN MODERATE TO STRENUOUS EXERCISE (LIKE A BRISK WALK)?: 4 DAYS

## 2023-05-25 SDOH — ECONOMIC STABILITY: TRANSPORTATION INSECURITY
IN THE PAST 12 MONTHS, HAS THE LACK OF TRANSPORTATION KEPT YOU FROM MEDICAL APPOINTMENTS OR FROM GETTING MEDICATIONS?: NO

## 2023-05-25 SDOH — ECONOMIC STABILITY: INCOME INSECURITY: HOW HARD IS IT FOR YOU TO PAY FOR THE VERY BASICS LIKE FOOD, HOUSING, MEDICAL CARE, AND HEATING?: NOT HARD AT ALL

## 2023-05-25 SDOH — ECONOMIC STABILITY: INCOME INSECURITY: IN THE LAST 12 MONTHS, WAS THERE A TIME WHEN YOU WERE NOT ABLE TO PAY THE MORTGAGE OR RENT ON TIME?: NO

## 2023-05-25 SDOH — HEALTH STABILITY: PHYSICAL HEALTH: ON AVERAGE, HOW MANY MINUTES DO YOU ENGAGE IN EXERCISE AT THIS LEVEL?: 60 MIN

## 2023-05-25 ASSESSMENT — SOCIAL DETERMINANTS OF HEALTH (SDOH)
IN A TYPICAL WEEK, HOW MANY TIMES DO YOU TALK ON THE PHONE WITH FAMILY, FRIENDS, OR NEIGHBORS?: TWICE A WEEK
DO YOU BELONG TO ANY CLUBS OR ORGANIZATIONS SUCH AS CHURCH GROUPS UNIONS, FRATERNAL OR ATHLETIC GROUPS, OR SCHOOL GROUPS?: YES
HOW OFTEN DO YOU ATTEND CHURCH OR RELIGIOUS SERVICES?: PATIENT DECLINED

## 2023-05-25 ASSESSMENT — LIFESTYLE VARIABLES
SKIP TO QUESTIONS 9-10: 1
HOW MANY STANDARD DRINKS CONTAINING ALCOHOL DO YOU HAVE ON A TYPICAL DAY: 1 OR 2
HOW OFTEN DO YOU HAVE A DRINK CONTAINING ALCOHOL: 2-4 TIMES A MONTH
HOW OFTEN DO YOU HAVE SIX OR MORE DRINKS ON ONE OCCASION: NEVER
AUDIT-C TOTAL SCORE: 2

## 2023-05-25 ASSESSMENT — PAIN SCALES - GENERAL: PAINLEVEL: NO PAIN (0)

## 2023-05-25 NOTE — PROGRESS NOTES
[facility]  [address]  [address]  [place] [address]  Phone: [phone]  Fax: [phone]  Primary Provider: Ant Wright  Pre-op Performing Provider: RIKKI HATHAWAY      PREOPERATIVE EVALUATION:  Today's date: 5/25/2023    Florencio Doshi is a 73 year old male who presents for a preoperative evaluation.      5/25/2023     4:27 PM   Additional Questions   Roomed by Medina   Accompanied by wife         5/25/2023     4:27 PM   Patient Reported Additional Medications   Patient reports taking the following new medications no     Surgical Information:  Surgery/Procedure: having kidney stones busted  Surgery Location: [facility]  Surgeon: Dr. Sanderson  Surgery Date: 6/1/23  Time of Surgery: 10:15  Where patient plans to recover: At home with family  Fax number for surgical facility: [phone] and [phone]    Assessment & Plan     The proposed surgical procedure is considered INTERMEDIATE risk.  (Z01.818) Preop general physical exam  (primary encounter diagnosis)  (N20.0) Kidney stone  Comment: Medically optimized for surgery  Plan: Basic metabolic panel  (Ca, Cl, CO2, Creat,         Gluc, K, Na, BUN)  Addendum: Slight bump in creatinine likely related to persistent stone. Should repeat BASIC METABOLIC PANEL (ELECTROLYTES AND KIDNEY FUNCTION) postoperatively    (R73.01) Impaired fasting glucose  Comment: Non fasting glucose was 112 8 days ago. Suitable for surgery    (I10) Essential hypertension with goal blood pressure less than 140/90  Comment: Stable.     (I70.0) Atherosclerosis of aorta (H)  Comment: Seen on imaging. Has been holding aspirin, and will continue to hold until after surgery.      - No identified additional risk factors other than previously addressed    Antiplatelet or Anticoagulation Medication Instructions:   - aspirin: Discontinue aspirin 7-10 days prior to procedure to reduce bleeding risk. It should be resumed postoperatively.    -Will avoid any NSAIDs. Stopped his fish oil and will hold until after surgery    Additional Medication Instructions:  Patient is to take all scheduled medications on the day of surgery    RECOMMENDATION:  APPROVAL GIVEN to proceed with proposed procedure, without further diagnostic evaluation.            Subjective       HPI related to upcoming procedure:         5/25/2023     4:10 PM   Preop Questions   1. Have you ever had a heart attack or stroke? No   2. Have you ever had surgery on your heart or blood vessels, such as a stent placement, a coronary artery bypass, or surgery on an artery in your head, neck, heart, or legs? No   3. Do you have chest pain with activity? No   4. Do you have a history of  heart failure? No   5. Do you currently have a cold, bronchitis or symptoms of other infection? No   6. Do you have a cough, shortness of breath, or wheezing? No   7. Do you or anyone in your family have previous history of blood clots? YES - Brother. Unclear if vein or artery.   8. Do you or does anyone in your family have a serious bleeding problem such as prolonged bleeding following surgeries or cuts? No   9. Have you ever had problems with anemia or been told to take iron pills? No   10. Have you had any abnormal blood loss such as black, tarry or bloody stools? No   11. Have you ever had a blood transfusion? No   12. Are you willing to have a blood transfusion if it is medically needed before, during, or after your surgery? Yes   13. Have you or any of your relatives ever had problems with anesthesia? No   14. Do you have sleep apnea, excessive snoring or daytime drowsiness? No   15. Do you have any artifical heart valves or other implanted medical devices like a pacemaker, defibrillator, or continuous glucose monitor? No   16. Do you have artificial joints? No   17. Are you allergic to latex? No     Preoperative Review of :   reviewed - no record of controlled substances prescribed.          Review  of Systems  Constitutional, neuro, ENT, endocrine, pulmonary, cardiac, gastrointestinal, genitourinary, musculoskeletal, integument and psychiatric systems are negative, except as otherwise noted.    Patient Active Problem List    Diagnosis Date Noted     Essential hypertension with goal blood pressure less than 140/90 09/01/2015     Priority: Medium     NAFLD (nonalcoholic fatty liver disease) 10/30/2014     Priority: Medium     Elevated transaminase level 10/30/2014     Priority: Medium     Obesity, Class I, BMI 30-34.9 08/27/2013     Priority: Medium     Impaired fasting glucose 02/07/2012     Priority: Medium     Gynecomastia 05/24/2011     Priority: Medium     Psoriasis 05/02/2011     Priority: Medium     CARDIOVASCULAR SCREENING; LDL GOAL LESS THAN 100 10/31/2010     Priority: Medium     Atherosclerosis of aorta (H) 09/17/2010     Priority: Medium     U/S 9/14/10 and 9/28/16: no aneurysm.       Atherosclerosis of native artery of left lower extremity (H) 09/17/2010     Priority: Medium      Past Medical History:   Diagnosis Date     Atherosclerosis of aorta (H) 9/17/2010     BPH      CARDIOVASCULAR SCREENING; LDL GOAL LESS THAN 100 10/31/2010    no hx of hyperlipidemia     Dislocation of elbow, left, open 6/19/12     Diverticulosis of colon     pan-     Elevated blood pressure reading without diagnosis of hypertension 9/1/2015     Elevated PSA      Epididymal cyst     RT     Essential hypertension with goal blood pressure less than 140/90      Fall from ladder 6/19/12     Hand eczema      Open fracture of left distal radius 6/19/12     Other atherosclerosis of native arteries of the extremities 9/17/2010     Psoriasis     palms     Pyelonephritis 1964     Past Surgical History:   Procedure Laterality Date     COLONOSCOPY WITH CO2 INSUFFLATION N/A 10/19/2017    Procedure: COLONOSCOPY WITH CO2 INSUFFLATION;  COLON SCREEN/ COHEN;  Surgeon: Tristan Tipton MD;  Location: [facility] OR     HC REPAIR FINGER/HAND  "FLEXOR NOT ZONE 2, EACH  1991    LT index     OPEN REDUCTION INTERNAL FIXATION ELBOW  6/19/12    LT medial tendon/ligament repair, Dr. Jameel Tian     OPEN REDUCTION INTERNAL FIXATION FOREARM  6/19/12    LT distal radius, Dr. Jameel Tian     PROSTATE BIOPSY, NEEDLE, SATURATION SAMPLING  7/31/07    Dr. Columba QUICK HAND/FINGER SURGERY UNLISTED       Current Outpatient Medications   Medication Sig Dispense Refill     atorvastatin (LIPITOR) 40 MG tablet Take 1 tablet (40 mg) by mouth daily for cholesterol. 90 tablet 1     Cholecalciferol (VITAMIN D) 1000 UNITS capsule 1 capsule daily        etodolac (LODINE) 400 MG tablet Take 1 tablet (400 mg) by mouth 2 times daily (with meals) as needed for wrist or other joint pain. 60 tablet 1     lisinopril (ZESTRIL) 30 MG tablet Take 1 tablet (30 mg) by mouth daily for blood pressure. 90 tablet 1     sildenafil (REVATIO) 20 MG tablet TAKE ONE TO THREE TABLETS BY MOUTH DAILY AS NEEDED, ONE TO THREE HOURS BEFORE INTAMACY; NEVER USE WITH NITROGLYCERIN, TERAZOSIN OR DOXAZOSIN 50 tablet 3     tamsulosin (FLOMAX) 0.4 MG capsule Take 0.4 mg by mouth daily       ASPIRIN 81 MG PO TABS 1 TABLET DAILY* (Patient not taking: Reported on 5/19/2023)         Allergies   Allergen Reactions     Clindamycin Rash        Social History     Tobacco Use     Smoking status: Never     Smokeless tobacco: Never   Vaping Use     Vaping status: Never Used   Substance Use Topics     Alcohol use: Yes     Comment: occassionally       History   Drug Use No         Objective     /80 (BP Location: Right arm, Patient Position: Chair, Cuff Size: Adult Regular)   Pulse 61   Temp 98  F (36.7  C) (Oral)   Resp 16   Ht 1.695 m (5' 6.75\")   Wt 91.4 kg (201 lb 9.6 oz)   SpO2 95%   BMI 31.81 kg/m      Physical Exam    GENERAL APPEARANCE: healthy, alert and no distress     EYES: EOMI,  PERRL     HENT: ear canals and TM's normal and nose and mouth without ulcers or lesions     NECK: no adenopathy, no asymmetry, " masses, or scars and thyroid normal to palpation     RESP: lungs clear to auscultation - no rales, rhonchi or wheezes     CV: regular rates and rhythm, normal S1 S2, no S3 or S4 and no murmur, click or rub     ABDOMEN:  soft, nontender, no HSM or masses and bowel sounds normal     MS: extremities normal- no gross deformities noted, no evidence of inflammation in joints, FROM in all extremities.     SKIN: no suspicious lesions or rashes     NEURO: Normal strength and tone, sensory exam grossly normal, mentation intact and speech normal     PSYCH: mentation appears normal. and affect normal/bright     LYMPHATICS: No cervical adenopathy    Recent Labs   Lab Test 03/24/23  1034 09/12/22  0952 04/25/22  0851 09/10/21  0916   NA  --  139  --  138   POTASSIUM 4.8 4.9   < > 4.6   CR 1.05 1.00   < > 1.09   A1C  --  5.9*  --  5.8*    < > = values in this interval not displayed.        Diagnostics:  Labs will be faxed separately.   No EKG required, no history of coronary heart disease, significant arrhythmia, peripheral arterial disease or other structural heart disease. Has some mild atherosclerosis of his aorta but no hx MI, symptomatic atherosclerosis, etc. Also, low risk procedure.     Revised Cardiac Risk Index (RCRI):  The patient has the following serious cardiovascular risks for perioperative complications:   - No serious cardiac risks = 0 points     RCRI Interpretation: 0 points: Class I (very low risk - 0.4% complication rate)           Signed Electronically by: PRISCILLA RECINOS CNP  Copy of this evaluation report is provided to requesting physician.

## 2023-05-26 ENCOUNTER — TELEPHONE (OUTPATIENT)
Dept: PEDIATRICS | Facility: CLINIC | Age: 73
End: 2023-05-26
Payer: MEDICARE

## 2023-05-26 LAB
ANION GAP SERPL CALCULATED.3IONS-SCNC: 13 MMOL/L (ref 7–15)
BUN SERPL-MCNC: 21.9 MG/DL (ref 8–23)
CALCIUM SERPL-MCNC: 9.2 MG/DL (ref 8.8–10.2)
CHLORIDE SERPL-SCNC: 104 MMOL/L (ref 98–107)
CREAT SERPL-MCNC: 1.21 MG/DL (ref 0.67–1.17)
DEPRECATED HCO3 PLAS-SCNC: 22 MMOL/L (ref 22–29)
GFR SERPL CREATININE-BSD FRML MDRD: 64 ML/MIN/1.73M2
GLUCOSE SERPL-MCNC: 95 MG/DL (ref 70–99)
POTASSIUM SERPL-SCNC: 4.6 MMOL/L (ref 3.4–5.3)
SODIUM SERPL-SCNC: 139 MMOL/L (ref 136–145)

## 2023-05-26 NOTE — TELEPHONE ENCOUNTER
Waiting on BMP to be resulted. When it is the pre op and labs will be faxed.   Yazmin Moran on 5/26/2023 at 9:18 AM

## 2023-05-26 NOTE — TELEPHONE ENCOUNTER
Wife called stating message left for her to call with fax number for pre-op..    Did not know fax numbers. [facility] urology phone [phone]    This nurse called [facility].    Fax for Dr. Sanderson- [phone]    Fax for [facility]- [phone].    Please fax pre op.    Rosa Isela Singh RN

## 2023-05-26 NOTE — PROGRESS NOTES
BMP still in process as of right now. Will check again at a later time.   Yazmin Moran on 5/26/2023 at 11:46 AM

## 2023-05-30 NOTE — TELEPHONE ENCOUNTER
YANIQUE, routed to [place] triage (cannot access result note) pt was informed of BMP result note, can you document and close? Thanks!    Pt and wife calls, informed of result note, they have f/u appointment with PCP 6/5/23, will discuss f/u creatinine with them as procedure is on a Thursday, preop has been faxed  The pre op has been faxed and received to both locations.   Yazmin Moran on 5/26/2023 at 2:34 PM   Chari Frazier, RN, BSN  [facility]

## 2023-06-05 ENCOUNTER — OFFICE VISIT (OUTPATIENT)
Dept: FAMILY MEDICINE | Facility: CLINIC | Age: 73
End: 2023-06-05
Payer: MEDICARE

## 2023-06-05 VITALS
TEMPERATURE: 97.4 F | WEIGHT: 200 LBS | DIASTOLIC BLOOD PRESSURE: 72 MMHG | HEIGHT: 67 IN | HEART RATE: 60 BPM | SYSTOLIC BLOOD PRESSURE: 122 MMHG | RESPIRATION RATE: 17 BRPM | OXYGEN SATURATION: 95 % | BODY MASS INDEX: 31.39 KG/M2

## 2023-06-05 DIAGNOSIS — R79.89 INCREASE IN SERUM CREATININE FROM PRIOR MEASUREMENT: ICD-10-CM

## 2023-06-05 DIAGNOSIS — I10 ESSENTIAL HYPERTENSION WITH GOAL BLOOD PRESSURE LESS THAN 140/90: Primary | ICD-10-CM

## 2023-06-05 DIAGNOSIS — Z23 NEED FOR VACCINATION: ICD-10-CM

## 2023-06-05 LAB
CREAT SERPL-MCNC: 1.11 MG/DL (ref 0.67–1.17)
GFR SERPL CREATININE-BSD FRML MDRD: 71 ML/MIN/1.73M2

## 2023-06-05 PROCEDURE — 0124A COVID-19 BIVALENT 12+ (PFIZER): CPT | Performed by: FAMILY MEDICINE

## 2023-06-05 PROCEDURE — 82565 ASSAY OF CREATININE: CPT | Performed by: FAMILY MEDICINE

## 2023-06-05 PROCEDURE — 36415 COLL VENOUS BLD VENIPUNCTURE: CPT | Performed by: FAMILY MEDICINE

## 2023-06-05 PROCEDURE — 91312 COVID-19 BIVALENT 12+ (PFIZER): CPT | Performed by: FAMILY MEDICINE

## 2023-06-05 PROCEDURE — 99213 OFFICE O/P EST LOW 20 MIN: CPT | Mod: 25 | Performed by: FAMILY MEDICINE

## 2023-06-05 ASSESSMENT — PAIN SCALES - GENERAL: PAINLEVEL: NO PAIN (0)

## 2023-06-05 NOTE — LETTER
June 6, 2023      Florencio Doshi  [address] [place] [address]      Mr. Doshi,     Your Creatinine has returned to normal.     Please contact the clinic if you have additional questions.  Thank you.     Sincerely,       Ant Wright MD    Resulted Orders   Creatinine   Result Value Ref Range    Creatinine 1.11 0.67 - 1.17 mg/dL    GFR Estimate 71 >60 mL/min/1.73m2      Comment:      eGFR calculated using 2021 CKD-EPI equation.

## 2023-06-05 NOTE — PROGRESS NOTES
Assessment & Plan     (I10) Essential hypertension with goal blood pressure less than 140/90  (primary encounter diagnosis)  Comment: well controlled.  Plan: Creatinine        Return in about 4 months (around 9/24/2023) for blood pressure check, recheck medications, lab tests.      (R79.89) Increase in serum creatinine from prior measurement  Comment: possibly associated with his kidney stones, hopefully a self-limited problem, now that his stones have been treated and the pressure is released.  Plan: Creatinine        follow up as needed. We will monitor his creatinine periodically anyway.    (Z23) Need for vaccination  Comment:   Plan: COVID-19 BIVALENT 12+ (PFIZER)                    Ant Wright MD  [facility]    Tito Matias is a 73 year old, presenting for the following health issues:  Blood Pressure Check and Hospital F/U        6/5/2023     9:27 AM   Additional Questions   Roomed by Ayah   Accompanied by Self     History of Present Illness       Hypertension: He presents for follow up of hypertension.  He does not check blood pressure  regularly outside of the clinic. Outpatient blood pressures have not been over 140/90. He does not follow a low salt diet.     He eats 2-3 servings of fruits and vegetables daily.He consumes 1 sweetened beverage(s) daily.He exercises with enough effort to increase his heart rate 9 or less minutes per day.  He exercises with enough effort to increase his heart rate 3 or less days per week.   He is taking medications regularly.         Hospital Follow-up Visit:    Hospital/Nursing Home/IP Rehab Facility: [facility]   Date of Admission: 05/15/2023  Date of Discharge: 05/18/2023  Reason(s) for Admission: Acute left flank pain    Was your hospitalization related to COVID-19? No   Problems taking medications regularly:  None  Medication changes since discharge: Flomax  Problems adhering to non-medication therapy:  None    Summary of  Called pt - he was advised the order is in his chart. I have mailed the order so he has a copy. Pt verbalized understanding that he can go after 12/4/21 to  the stool kit.    "hospitalization:  CareEverywhere information obtained and reviewed          The patient reports that he has lost 8 pounds since March most likely because he has been eating less.    Review of Systems         Objective    /72 (BP Location: Left arm, Patient Position: Chair, Cuff Size: Adult Large)   Pulse 60   Temp 97.4  F (36.3  C) (Oral)   Resp 17   Ht 1.695 m (5' 6.73\")   Wt 90.7 kg (200 lb)   SpO2 95%   BMI 31.58 kg/m    Body mass index is 31.58 kg/m .  Physical Exam   GENERAL: healthy, alert and no distress  EYES: Eyes grossly normal to inspection, PERRL, EOMI, sclerae white and conjunctivae normal  MS: no gross musculoskeletal defects noted, no edema  SKIN: no suspicious lesions or rashes to visible skin  NEURO: Normal strength and tone, sensory exam grossly normal, mentation intact, oriented times 3 and cranial nerves 2-12 intact  PSYCH: mentation appears normal, affect normal/bright     Office Visit on 05/25/2023   Component Date Value Ref Range Status     Sodium 05/25/2023 139  136 - 145 mmol/L Final     Potassium 05/25/2023 4.6  3.4 - 5.3 mmol/L Final     Chloride 05/25/2023 104  98 - 107 mmol/L Final     Carbon Dioxide (CO2) 05/25/2023 22  22 - 29 mmol/L Final     Anion Gap 05/25/2023 13  7 - 15 mmol/L Final     Urea Nitrogen 05/25/2023 21.9  8.0 - 23.0 mg/dL Final     Creatinine 05/25/2023 1.21 (H)  0.67 - 1.17 mg/dL Final     Calcium 05/25/2023 9.2  8.8 - 10.2 mg/dL Final     Glucose 05/25/2023 95  70 - 99 mg/dL Final     GFR Estimate 05/25/2023 64  >60 mL/min/1.73m2 Final    eGFR calculated using 2021 CKD-EPI equation.                 This document serves as a record of the services and decisions personally performed and made by Dr. Wright. It was created on his behalf by Lawrence Aviles, a trained medical scribe. The creation of this document is based the provider's statements to the medical scribe.  Lawrence Aviles,  10:39 AM    "

## 2023-06-05 NOTE — PATIENT INSTRUCTIONS
At [facility], we strive to deliver an exceptional experience to you, every time we see you. If you receive a survey, please complete it as we do value your feedback.  If you have MyChart, you can expect to receive results automatically within 24 hours of their completion.  Your provider will send a note interpreting your results as well.   If you do not have MyChart, you should receive your results in about a week by mail.    Your care team:                            Family Medicine Internal Medicine   MD Ortiz Carson MD Shantel Branch-Fleming, MD Srinivasa Vaka, MD Katya Belousova, PAPRISCILLA Ojeda CNP, MD ([facility]   Blaine Le, MD Celia Fung MD Amelia Massimini APRN CAMPOS Waller APRN MD Andres Torres MD          Clinic hours: Monday - Thursday 7 am-6 pm; Fridays 7 am-5 pm.   Urgent care: Monday - Friday 10 am- 8 pm; Saturday and Sunday 9 am-5 pm.    Clinic: [phone]       [facility]: Monday - Thursday 8 am - 7 pm; Friday 8 am - 6 pm  [facility]: [phone]

## 2023-06-16 ENCOUNTER — TRANSFERRED RECORDS (OUTPATIENT)
Dept: HEALTH INFORMATION MANAGEMENT | Facility: CLINIC | Age: 73
End: 2023-06-16
Payer: MEDICARE

## 2023-09-26 ENCOUNTER — OFFICE VISIT (OUTPATIENT)
Dept: FAMILY MEDICINE | Facility: CLINIC | Age: 73
End: 2023-09-26
Payer: MEDICARE

## 2023-09-26 VITALS
TEMPERATURE: 97.9 F | RESPIRATION RATE: 16 BRPM | HEART RATE: 58 BPM | OXYGEN SATURATION: 96 % | SYSTOLIC BLOOD PRESSURE: 119 MMHG | WEIGHT: 204.6 LBS | DIASTOLIC BLOOD PRESSURE: 80 MMHG | BODY MASS INDEX: 32.11 KG/M2 | HEIGHT: 67 IN

## 2023-09-26 DIAGNOSIS — I10 ESSENTIAL HYPERTENSION WITH GOAL BLOOD PRESSURE LESS THAN 140/90: Primary | ICD-10-CM

## 2023-09-26 DIAGNOSIS — Z23 NEED FOR VACCINATION: ICD-10-CM

## 2023-09-26 DIAGNOSIS — N52.9 ERECTILE DYSFUNCTION, UNSPECIFIED ERECTILE DYSFUNCTION TYPE: ICD-10-CM

## 2023-09-26 DIAGNOSIS — Z83.2 FAMILY HISTORY OF FACTOR V LEIDEN MUTATION: ICD-10-CM

## 2023-09-26 DIAGNOSIS — I70.202 ATHEROSCLEROSIS OF NATIVE ARTERY OF LEFT LOWER EXTREMITY, WITH UNSPECIFIED PRESENCE OF CLINICAL MANIFESTATION (H): ICD-10-CM

## 2023-09-26 DIAGNOSIS — I70.0 ATHEROSCLEROSIS OF AORTA (H): ICD-10-CM

## 2023-09-26 DIAGNOSIS — Z83.2 FAMILY HISTORY OF PROTEIN C DEFICIENCY: ICD-10-CM

## 2023-09-26 LAB
FACTOR 2 INTERPRETATION: NORMAL
FACTOR V INTERPRETATION: NORMAL
LAB DIRECTOR COMMENTS: NORMAL
LAB DIRECTOR DISCLAIMER: NORMAL
LAB DIRECTOR INTERPRETATION: NORMAL
LAB DIRECTOR METHODOLOGY: NORMAL
LAB DIRECTOR RESULTS: NORMAL
SPECIMEN DESCRIPTION: NORMAL

## 2023-09-26 PROCEDURE — 84460 ALANINE AMINO (ALT) (SGPT): CPT | Performed by: FAMILY MEDICINE

## 2023-09-26 PROCEDURE — 81241 F5 GENE: CPT | Performed by: FAMILY MEDICINE

## 2023-09-26 PROCEDURE — 85303 CLOT INHIBIT PROT C ACTIVITY: CPT | Performed by: FAMILY MEDICINE

## 2023-09-26 PROCEDURE — 84132 ASSAY OF SERUM POTASSIUM: CPT | Performed by: FAMILY MEDICINE

## 2023-09-26 PROCEDURE — 82565 ASSAY OF CREATININE: CPT | Performed by: FAMILY MEDICINE

## 2023-09-26 PROCEDURE — 90662 IIV NO PRSV INCREASED AG IM: CPT | Performed by: FAMILY MEDICINE

## 2023-09-26 PROCEDURE — G0008 ADMIN INFLUENZA VIRUS VAC: HCPCS | Performed by: FAMILY MEDICINE

## 2023-09-26 PROCEDURE — 85307 ASSAY ACTIVATED PROTEIN C: CPT | Performed by: FAMILY MEDICINE

## 2023-09-26 PROCEDURE — 81240 F2 GENE: CPT | Performed by: FAMILY MEDICINE

## 2023-09-26 PROCEDURE — G0452 MOLECULAR PATHOLOGY INTERPR: HCPCS | Performed by: PATHOLOGY

## 2023-09-26 PROCEDURE — 80061 LIPID PANEL: CPT | Performed by: FAMILY MEDICINE

## 2023-09-26 PROCEDURE — 99214 OFFICE O/P EST MOD 30 MIN: CPT | Mod: 25 | Performed by: FAMILY MEDICINE

## 2023-09-26 PROCEDURE — 36415 COLL VENOUS BLD VENIPUNCTURE: CPT | Performed by: FAMILY MEDICINE

## 2023-09-26 RX ORDER — ATORVASTATIN CALCIUM 40 MG/1
40 TABLET, FILM COATED ORAL DAILY
Qty: 90 TABLET | Refills: 1 | Status: SHIPPED | OUTPATIENT
Start: 2023-09-26 | End: 2024-03-26

## 2023-09-26 RX ORDER — ASPIRIN 81 MG/1
81 TABLET ORAL DAILY
COMMUNITY

## 2023-09-26 RX ORDER — CETIRIZINE HYDROCHLORIDE 10 MG/1
10 TABLET ORAL DAILY
COMMUNITY

## 2023-09-26 RX ORDER — LISINOPRIL 30 MG/1
30 TABLET ORAL DAILY
Qty: 90 TABLET | Refills: 1 | Status: SHIPPED | OUTPATIENT
Start: 2023-09-26 | End: 2024-03-26

## 2023-09-26 RX ORDER — SILDENAFIL CITRATE 20 MG/1
TABLET ORAL
Qty: 50 TABLET | Refills: 3 | Status: SHIPPED | OUTPATIENT
Start: 2023-09-26 | End: 2024-08-08

## 2023-09-26 ASSESSMENT — PAIN SCALES - GENERAL: PAINLEVEL: NO PAIN (0)

## 2023-09-26 NOTE — PROGRESS NOTES
Assessment & Plan     (I10) Essential hypertension with goal blood pressure less than 140/90  (primary encounter diagnosis)  Comment: well controlled   Plan: lisinopril (ZESTRIL) 30 MG tablet, Potassium,         Creatinine        Return in about 6 months (around 3/26/2024) for Medicare annual wellness exam, cholesterol, blood pressure check.      (I70.0) Atherosclerosis of aorta (H)  (I70.202) Atherosclerosis of native artery of left lower extremity, with unspecified presence of clinical manifestation (H)  Comment: Fasting. He is on a high-intensity statin.   Plan: atorvastatin (LIPITOR) 40 MG tablet, Lipid         panel reflex to direct LDL Non-fasting, ALT        Recheck lipids in 6-12 months     (N52.9) Erectile dysfunction, unspecified erectile dysfunction type  Comment: prescription update   Plan: sildenafil (REVATIO) 20 MG tablet            (Z83.2) Family history of factor V Leiden mutation  Plan: Factor 2 and 5 mutation analysis  (Z83.2) Family history of protein C deficiency  Plan: Activated protein C resistance, Protein C         chromogenic        follow up with Hematology for recommendation regarding long term management if he has a positive test.   Comment: As I understand it, his brother is dealing with DVT and has instructed the patient to be checked for these, presumably because the doctor said the brother has them.     (Z23) Need for vaccination  Comment:   Plan: INFLUENZA VACCINE 65+ (FLUZONE HD)        Recommend updated COVID-19 bivalent when available       Ant Wright MD  [facility]    Tito Matias is a 73 year old, presenting for the following health issues:  Hypertension and Lipids      9/26/2023    11:48 AM   Additional Questions   Roomed by Ayah   Accompanied by self       History of Present Illness       CKD: He is not using over the counter pain medicine.     Hyperlipidemia:  He presents for follow up of hyperlipidemia.   He is taking medication  "to lower cholesterol. He is not having myalgia or other side effects to statin medications.    Hypertension: He presents for follow up of hypertension.  He does not check blood pressure  regularly outside of the clinic. Outpatient blood pressures have not been over 140/90. He follows a low salt diet.     He eats 2-3 servings of fruits and vegetables daily.He consumes 1 sweetened beverage(s) daily.He exercises with enough effort to increase his heart rate 30 to 60 minutes per day.  He exercises with enough effort to increase his heart rate 3 or less days per week.   He is taking medications regularly.       Review of Systems         Objective    /80 (BP Location: Left arm, Patient Position: Sitting, Cuff Size: Adult Regular)   Pulse 58   Temp 97.9  F (36.6  C) (Oral)   Resp 16   Ht 1.695 m (5' 6.73\")   Wt 92.8 kg (204 lb 9.6 oz)   SpO2 96%   BMI 32.30 kg/m    Body mass index is 32.3 kg/m .  Physical Exam   GENERAL: healthy, alert and no distress  EYES: Eyes grossly normal to inspection, PERRL, EOMI, sclerae white and conjunctivae normal  RESP: lungs clear to auscultation - no crackles or wheezes, no areas of dullness, no tachypnea  CV: Heart regular rate and rhythm without murmur, click or rub. No peripheral edema and peripheral pulses strong  MS: no gross musculoskeletal defects noted, no edema  SKIN: no suspicious lesions or rashes to visible skin  NEURO: Normal strength and tone, sensory exam grossly normal, mentation intact, oriented times 3 and cranial nerves 2-12 intact  PSYCH: mentation appears normal, affect normal/bright                 This document serves as a record of the services and decisions personally performed and made by Dr. Wright. It was created on his behalf by Heath Suh, a trained medical scribe. The creation of this document is based the provider's statements to the medical scribe.  Heath Suh,  1:11 PM        "

## 2023-09-27 ENCOUNTER — TELEPHONE (OUTPATIENT)
Dept: FAMILY MEDICINE | Facility: CLINIC | Age: 73
End: 2023-09-27
Payer: MEDICARE

## 2023-09-27 ENCOUNTER — TRANSFERRED RECORDS (OUTPATIENT)
Dept: HEALTH INFORMATION MANAGEMENT | Facility: CLINIC | Age: 73
End: 2023-09-27
Payer: MEDICARE

## 2023-09-27 LAB
ALT SERPL W P-5'-P-CCNC: 42 U/L (ref 0–70)
CHOLEST SERPL-MCNC: 122 MG/DL
CREAT SERPL-MCNC: 1.02 MG/DL (ref 0.67–1.17)
EGFRCR SERPLBLD CKD-EPI 2021: 78 ML/MIN/1.73M2
HDLC SERPL-MCNC: 48 MG/DL
LDLC SERPL CALC-MCNC: 46 MG/DL
NONHDLC SERPL-MCNC: 74 MG/DL
POTASSIUM SERPL-SCNC: 4.4 MMOL/L (ref 3.4–5.3)
TRIGL SERPL-MCNC: 140 MG/DL

## 2023-09-27 NOTE — TELEPHONE ENCOUNTER
Plan does not cover sildenafil (REVATIO) 20 MG tablet .  Please call [phone] to initiate Prior Auth or switch to alternative medication.    Insurance type and ID number: ID# [id number]      Additional Information:     Fawn Suh

## 2023-09-28 LAB
APCR PPP: 2.91 {RATIO}
PROT C ACT/NOR PPP CHRO: 116 % (ref 70–170)

## 2023-10-10 ENCOUNTER — TELEPHONE (OUTPATIENT)
Dept: FAMILY MEDICINE | Facility: CLINIC | Age: 73
End: 2023-10-10
Payer: MEDICARE

## 2023-10-10 NOTE — TELEPHONE ENCOUNTER
Patient returning call regarding recent labs.  Provider message below was relayed to patient.      Patient had no further questions or concerns.    Patient still wanting a copy of his labs sent in the mail to home address.    Magdalena FONG RN, BSN  [facility]

## 2023-10-10 NOTE — TELEPHONE ENCOUNTER
Called and left a message with the person answering the phone and patient will return our call for a message from his provider.  Krista Stratton MA  [facility]   Primary Care

## 2023-10-10 NOTE — TELEPHONE ENCOUNTER
Patient calling for his lab results. Please mail to his home address.  Krista Stratton MA  [facility]   Primary Care

## 2023-10-25 ENCOUNTER — IMMUNIZATION (OUTPATIENT)
Dept: NURSING | Facility: CLINIC | Age: 73
End: 2023-10-25
Payer: MEDICARE

## 2023-10-25 PROCEDURE — 91320 SARSCV2 VAC 30MCG TRS-SUC IM: CPT

## 2023-10-25 PROCEDURE — 90480 ADMN SARSCOV2 VAC 1/ONLY CMP: CPT

## 2024-02-08 ENCOUNTER — TELEPHONE (OUTPATIENT)
Dept: FAMILY MEDICINE | Facility: CLINIC | Age: 74
End: 2024-02-08
Payer: MEDICARE

## 2024-02-27 ENCOUNTER — DOCUMENTATION ONLY (OUTPATIENT)
Dept: OTHER | Facility: CLINIC | Age: 74
End: 2024-02-27
Payer: MEDICARE

## 2024-03-15 ENCOUNTER — TELEPHONE (OUTPATIENT)
Dept: FAMILY MEDICINE | Facility: CLINIC | Age: 74
End: 2024-03-15
Payer: MEDICARE

## 2024-03-15 NOTE — TELEPHONE ENCOUNTER
Patient came to the clinic and dropped off health care directive     Placed in [place] black box at the

## 2024-03-20 ENCOUNTER — TRANSFERRED RECORDS (OUTPATIENT)
Dept: HEALTH INFORMATION MANAGEMENT | Facility: CLINIC | Age: 74
End: 2024-03-20
Payer: MEDICARE

## 2024-03-21 ENCOUNTER — TELEPHONE (OUTPATIENT)
Dept: FAMILY MEDICINE | Facility: CLINIC | Age: 74
End: 2024-03-21
Payer: MEDICARE

## 2024-03-21 NOTE — TELEPHONE ENCOUNTER
Patient Quality Outreach    Patient is due for the following:   Physical Annual Wellness Visit  There are no preventive care reminders to display for this patient.    Next Steps:   Patient has upcoming appointment, these items will be addressed at that time.    Type of outreach:    Chart review performed, no outreach needed.      Questions for provider review:    None           Awa Rinaldi CNA

## 2024-03-26 ENCOUNTER — OFFICE VISIT (OUTPATIENT)
Dept: FAMILY MEDICINE | Facility: CLINIC | Age: 74
End: 2024-03-26
Payer: MEDICARE

## 2024-03-26 VITALS
BODY MASS INDEX: 32.33 KG/M2 | HEIGHT: 67 IN | HEART RATE: 61 BPM | OXYGEN SATURATION: 96 % | SYSTOLIC BLOOD PRESSURE: 129 MMHG | WEIGHT: 206 LBS | DIASTOLIC BLOOD PRESSURE: 80 MMHG | RESPIRATION RATE: 18 BRPM | TEMPERATURE: 97.6 F

## 2024-03-26 DIAGNOSIS — Z00.00 ENCOUNTER FOR MEDICARE ANNUAL WELLNESS EXAM: Primary | ICD-10-CM

## 2024-03-26 DIAGNOSIS — Z12.5 SCREENING FOR PROSTATE CANCER: ICD-10-CM

## 2024-03-26 DIAGNOSIS — I10 ESSENTIAL HYPERTENSION WITH GOAL BLOOD PRESSURE LESS THAN 140/90: ICD-10-CM

## 2024-03-26 DIAGNOSIS — R73.01 IMPAIRED FASTING GLUCOSE: ICD-10-CM

## 2024-03-26 DIAGNOSIS — Z29.11 NEED FOR VACCINATION AGAINST RESPIRATORY SYNCYTIAL VIRUS: ICD-10-CM

## 2024-03-26 DIAGNOSIS — I70.202 ATHEROSCLEROSIS OF NATIVE ARTERY OF LEFT LOWER EXTREMITY, WITH UNSPECIFIED PRESENCE OF CLINICAL MANIFESTATION (H): ICD-10-CM

## 2024-03-26 DIAGNOSIS — Z87.442 HISTORY OF URIC ACID STAGHORN CALCULUS: ICD-10-CM

## 2024-03-26 DIAGNOSIS — I70.0 ATHEROSCLEROSIS OF AORTA (H): ICD-10-CM

## 2024-03-26 DIAGNOSIS — E79.0 HYPERURICEMIA: ICD-10-CM

## 2024-03-26 LAB
ALBUMIN SERPL BCG-MCNC: 4.5 G/DL (ref 3.5–5.2)
ALP SERPL-CCNC: 89 U/L (ref 40–150)
ALT SERPL W P-5'-P-CCNC: 38 U/L (ref 0–70)
ANION GAP SERPL CALCULATED.3IONS-SCNC: 10 MMOL/L (ref 7–15)
AST SERPL W P-5'-P-CCNC: 31 U/L (ref 0–45)
BILIRUB SERPL-MCNC: 1 MG/DL
BUN SERPL-MCNC: 20 MG/DL (ref 8–23)
CALCIUM SERPL-MCNC: 9.6 MG/DL (ref 8.8–10.2)
CHLORIDE SERPL-SCNC: 102 MMOL/L (ref 98–107)
CHOLEST SERPL-MCNC: 121 MG/DL
CREAT SERPL-MCNC: 1.13 MG/DL (ref 0.67–1.17)
DEPRECATED HCO3 PLAS-SCNC: 27 MMOL/L (ref 22–29)
EGFRCR SERPLBLD CKD-EPI 2021: 69 ML/MIN/1.73M2
FASTING STATUS PATIENT QL REPORTED: YES
GLUCOSE SERPL-MCNC: 106 MG/DL (ref 70–99)
HBA1C MFR BLD: 6.2 % (ref 0–5.6)
HDLC SERPL-MCNC: 44 MG/DL
LDLC SERPL CALC-MCNC: 54 MG/DL
NONHDLC SERPL-MCNC: 77 MG/DL
POTASSIUM SERPL-SCNC: 5 MMOL/L (ref 3.4–5.3)
PROT SERPL-MCNC: 7.8 G/DL (ref 6.4–8.3)
PSA SERPL DL<=0.01 NG/ML-MCNC: 3.35 NG/ML (ref 0–6.5)
SODIUM SERPL-SCNC: 139 MMOL/L (ref 135–145)
TRIGL SERPL-MCNC: 117 MG/DL

## 2024-03-26 PROCEDURE — 80061 LIPID PANEL: CPT | Performed by: FAMILY MEDICINE

## 2024-03-26 PROCEDURE — 80053 COMPREHEN METABOLIC PANEL: CPT | Performed by: FAMILY MEDICINE

## 2024-03-26 PROCEDURE — 99214 OFFICE O/P EST MOD 30 MIN: CPT | Mod: 25 | Performed by: FAMILY MEDICINE

## 2024-03-26 PROCEDURE — 83036 HEMOGLOBIN GLYCOSYLATED A1C: CPT | Performed by: FAMILY MEDICINE

## 2024-03-26 PROCEDURE — 36415 COLL VENOUS BLD VENIPUNCTURE: CPT | Performed by: FAMILY MEDICINE

## 2024-03-26 PROCEDURE — G0103 PSA SCREENING: HCPCS | Performed by: FAMILY MEDICINE

## 2024-03-26 PROCEDURE — G0439 PPPS, SUBSEQ VISIT: HCPCS | Performed by: FAMILY MEDICINE

## 2024-03-26 RX ORDER — ATORVASTATIN CALCIUM 40 MG/1
40 TABLET, FILM COATED ORAL DAILY
Qty: 90 TABLET | Refills: 1 | Status: SHIPPED | OUTPATIENT
Start: 2024-03-26 | End: 2024-08-19

## 2024-03-26 RX ORDER — LISINOPRIL 30 MG/1
30 TABLET ORAL DAILY
Qty: 90 TABLET | Refills: 1 | Status: SHIPPED | OUTPATIENT
Start: 2024-03-26 | End: 2024-09-26

## 2024-03-26 SDOH — HEALTH STABILITY: PHYSICAL HEALTH: ON AVERAGE, HOW MANY MINUTES DO YOU ENGAGE IN EXERCISE AT THIS LEVEL?: 60 MIN

## 2024-03-26 SDOH — HEALTH STABILITY: PHYSICAL HEALTH: ON AVERAGE, HOW MANY DAYS PER WEEK DO YOU ENGAGE IN MODERATE TO STRENUOUS EXERCISE (LIKE A BRISK WALK)?: 4 DAYS

## 2024-03-26 ASSESSMENT — PAIN SCALES - GENERAL: PAINLEVEL: NO PAIN (0)

## 2024-03-26 ASSESSMENT — SOCIAL DETERMINANTS OF HEALTH (SDOH): HOW OFTEN DO YOU GET TOGETHER WITH FRIENDS OR RELATIVES?: PATIENT DECLINED

## 2024-03-26 NOTE — PATIENT INSTRUCTIONS
At [facility], we strive to deliver an exceptional experience to you, every time we see you. If you receive a survey, please complete it as we do value your feedback.  If you have MyChart, you can expect to receive results automatically within 24 hours of their completion.  Your provider will send a note interpreting your results as well.   If you do not have MyChart, you should receive your results in about a week by mail.    Your care team:                            Family Medicine Internal Medicine   MD Ortiz Carson, MD Talya Farmer, MD Christopher Cordova, MD Tanika Vo, PA-C    Elijah Stark, MD Pediatrics   Blaine Le, PA-C  Huyen Cuellar, MD Celia Nance, MD Katy Blake APRN CNP   PRISCILLA Merrill CNP, MD Andres Zhu, MD Hannah Rodriguez, CNP  Same-Day (No follow up visit)   Bertram Ray, CAMPOS Duke, PA-C    Ellie Weinstein PAEnioC     Clinic hours: Monday - Thursday 7 am-6 pm; Fridays 7 am-5 pm.   Urgent care: Monday - Friday 10 am- 8 pm; Saturday and Sunday 9 am-5 pm.    Clinic: [phone]       [facility]: Monday - Thursday 8 am - 7 pm; Friday 8 am - 6 pm  [facility]: [phone]     Preventive Care Advice   This is general advice given by our system to help you stay healthy. However, your care team may have specific advice just for you. Please talk to your care team about your preventive care needs.  Nutrition  Eat 5 or more servings of fruits and vegetables each day.  Try wheat bread, brown rice and whole grain pasta (instead of white bread, rice, and pasta).  Get enough calcium and vitamin D. Check the label on foods and aim for 100% of the RDA (recommended daily allowance).  Lifestyle  Aerobic exercise for at least 150 minutes per week (40+ minutes per day, 4-6 days per week). This will help you control your weight and prevent  disease.   Do muscle strengthening activities 2 days a week. These help control your weight and prevent disease.  No smoking.  Wear sunscreen to prevent skin cancer.  Have a dental exam and cleaning every 6 months.  Yearly exams  See your health care team every year to talk about:  Any changes in your health.  Any medicines your care team has prescribed.  Preventive care, family planning, and ways to prevent chronic diseases.  Shots (vaccines)   HPV shots (up to age 26), if you've never had them before.  Hepatitis B shots (up to age 59), if you've never had them before.  COVID-19 shot: Get this shot when it's due.  Flu shot: Get a flu shot every year.  Tetanus shot: Get a tetanus shot every 10 years.  Pneumococcal, hepatitis A, and RSV shots: Ask your care team if you need these based on your risk.  Shingles shot (for age 50 and up).  General health tests  Diabetes screening:  Starting at age 35, Get screened for diabetes at least every 3 years.  If you are younger than age 35, ask your care team if you should be screened for diabetes.  Cholesterol test: At age 39, start having a cholesterol test every 5 years, or more often if advised.  Bone density scan (DEXA): At age 50, ask your care team if you should have this scan for osteoporosis (brittle bones).  Hepatitis C: Get tested at least once in your life.  STIs (sexually transmitted infections)  Before age 24: Ask your care team if you should be screened for STIs.  After age 24: Get screened for STIs if you're at risk. You are at risk for STIs (including HIV) if:  You are sexually active with more than one person.  You don't use condoms every time.  You or a partner was diagnosed with a sexually transmitted infection.  If you are at risk for HIV, ask about PrEP medicine to prevent HIV.  Get tested for HIV at least once in your life, whether you are at risk for HIV or not.  Cancer screening tests  Cervical cancer screening: If you have a cervix, begin getting  regular cervical cancer screening tests at age 21. Most people who have regular screenings with normal results can stop after age 65. Talk about this with your provider.  Breast cancer scan (mammogram): If you've ever had breasts, begin having regular mammograms starting at age 40. This is a scan to check for breast cancer.  Colon cancer screening: It is important to start screening for colon cancer at age 45.  Have a colonoscopy test every 10 years (or more often if you're at risk) Or, ask your provider about stool tests like a FIT test every year or Cologuard test every 3 years.  To learn more about your testing options, visit: [email or web address].  For help making a decision, visit: [email or web address].  Prostate cancer screening test: If you have a prostate and are age 55 to 69, ask your provider if you would benefit from a yearly prostate cancer screening test.  Lung cancer screening: If you are a current or former smoker age 50 to 80, ask your care team if ongoing lung cancer screenings are right for you.  For informational purposes only. Not to replace the advice of your health care provider. Copyright   2023 [employer]. All rights reserved. Clinically reviewed by the [facility] Transitions Program. [employer] [id number] - REV 01/24.

## 2024-03-26 NOTE — PROGRESS NOTES
"Preventive Care Visit  [facility]  Ant Wright MD, Family Medicine  Mar 26, 2024      Assessment & Plan     (Z00.00) Encounter for Medicare annual wellness exam  (primary encounter diagnosis)  Comment: Negative screening exam; up-to-date on preventive services.   Plan: Prostate Specific Antigen Screen        follow up in 1 year.     (I70.0) Atherosclerosis of aorta (H24)  (I70.202) Atherosclerosis of native artery of left lower extremity, with unspecified presence of clinical manifestation (H24)  Comment: he is on a high-intensity statin. Clinically stable.   Plan: atorvastatin (LIPITOR) 40 MG tablet, Lipid         panel reflex to direct LDL Non-fasting,         Comprehensive metabolic panel         Return in about 6 months (around 9/26/2024) for cholesterol, blood pressure check.      (I10) Essential hypertension with goal blood pressure less than 140/90  Comment: well controlled.   Plan: lisinopril (ZESTRIL) 30 MG tablet,         Comprehensive metabolic panel         Return in about 6 months (around 9/26/2024) for cholesterol, blood pressure check.      (E79.0) Hyperuricemia  (Z87.442) History of uric acid ureteral calculus  Comment: He has been dealing with kidney stones over the past year, and his urologist recently put him on a low purine diet for a uric acid level of about 7.  Plan: Comprehensive metabolic panel            (R73.01) Impaired fasting glucose  Comment:   Plan: Comprehensive metabolic panel, Hemoglobin A1c            (Z12.5) Screening for prostate cancer  Comment: his urologist has him on tamsulosin for BPH.   Plan: Prostate Specific Antigen Screen            (Z29.11) Need for vaccination against respiratory syncytial virus  Comment: pharmacy  Plan: VIS provided.         BMI  Estimated body mass index is 32.52 kg/m  as calculated from the following:    Height as of this encounter: 1.695 m (5' 6.73\").    Weight as of this encounter: 93.4 kg (206 lb).   Weight " management plan: exercises recommendations summarized in the AVS.    Counseling  Appropriate preventive services were discussed with this patient, including applicable screening as appropriate for fall prevention, nutrition, physical activity, Tobacco-use cessation, weight loss and cognition.  Checklist reviewing preventive services available has been given to the patient.  Reviewed patient's diet, addressing concerns and/or questions.           Tito Matias is a 74 year old, presenting for the following:  Physical        3/26/2024     8:22 AM   Additional Questions   Roomed by Anahi   Accompanied by Self         Health Care Directive  Patient does not have a Health Care Directive or Living Will: Advance Directive received and scanned. Click on Code in the patient header to view.    HPI          3/26/2024   General Health   How would you rate your overall physical health? Good   Feel stress (tense, anxious, or unable to sleep) Not at all         3/26/2024   Nutrition   Diet: Regular (no restrictions)         3/26/2024   Exercise   Days per week of moderate/strenous exercise 4 days   Average minutes spent exercising at this level 60 min         3/26/2024   Social Factors   Frequency of gathering with friends or relatives Patient declined   Worry food won't last until get money to buy more Patient declined   Food not last or not have enough money for food? Patient declined   Do you have housing?  Patient declined   Are you worried about losing your housing? Patient declined   Lack of transportation? Patient declined   Unable to get utilities (heat,electricity)? Patient declined         3/24/2023   Fall Risk   Fallen 2 or more times in the past year? No          3/26/2024   Activities of Daily Living- Home Safety   Needs help with the following daily activites None of the above   Safety concerns in the home None of the above         3/26/2024   Dental   Dentist two times every year? Yes         3/26/2024    Hearing Screening   Hearing concerns? None of the above         3/26/2024   Driving Risk Screening   Patient/family members have concerns about driving No         3/26/2024   General Alertness/Fatigue Screening   Have you been more tired than usual lately? No         3/26/2024   Urinary Incontinence Screening   Bothered by leaking urine in past 6 months No         3/26/2024   TB Screening   Were you born outside of the [place]? No         Today's PHQ-2 Score:       3/26/2024     8:25 AM   PHQ-2 ( 1999 Pfizer)   Q1: Little interest or pleasure in doing things 0   Q2: Feeling down, depressed or hopeless 0   PHQ-2 Score 0   Q1: Little interest or pleasure in doing things Not at all   Q2: Feeling down, depressed or hopeless Not at all   PHQ-2 Score 0           3/26/2024   Substance Use   Alcohol more than 3/day or more than 7/wk No   Do you have a current opioid prescription? No   How severe/bad is pain from 1 to 10? 0/10 (No Pain)   Do you use any other substances recreationally? No     Social History     Tobacco Use    Smoking status: Never    Smokeless tobacco: Never   Vaping Use    Vaping Use: Never used   Substance Use Topics    Alcohol use: Yes     Comment: occassionally    Drug use: No           3/26/2024   AAA Screening   Family history of Abdominal Aortic Aneurysm (AAA)? (!) YES    ASCVD Risk   The ASCVD Risk score (Parvez DOTSON, et al., 2019) failed to calculate for the following reasons:    The valid total cholesterol range is 130 to 320 mg/dL              Current providers sharing in care for this patient include:  Patient Care Team:  Ant Wright MD as PCP - General  Ant Wright MD as Assigned PCP  Ant Wright MD as MD (Family Practice)  Yuni Robbins MD as MD (Internal Medicine)    The following health maintenance items are reviewed in Epic and correct as of today:  Health Maintenance   Topic Date Due    RSV VACCINE (Pregnancy & 60+) (1 - 1-dose 60+ series) Never done    FALL  "RISK ASSESSMENT  03/24/2024    LIPID  09/26/2024    ANNUAL REVIEW OF HM ORDERS  09/26/2024    MEDICARE ANNUAL WELLNESS VISIT  03/26/2025    GLUCOSE  05/25/2026    COLORECTAL CANCER SCREENING  10/19/2027    ADVANCE CARE PLANNING  02/27/2029    DTAP/TDAP/TD IMMUNIZATION (4 - Td or Tdap) 06/03/2029    HEPATITIS C SCREENING  Completed    PHQ-2 (once per calendar year)  Completed    INFLUENZA VACCINE  Completed    Pneumococcal Vaccine: 65+ Years  Completed    ZOSTER IMMUNIZATION  Completed    COVID-19 Vaccine  Completed    IPV IMMUNIZATION  Aged Out    HPV IMMUNIZATION  Aged Out    MENINGITIS IMMUNIZATION  Aged Out    RSV MONOCLONAL ANTIBODY  Aged Out       Review of Systems      Objective    Exam  /80 (BP Location: Left arm, Patient Position: Chair, Cuff Size: Adult Large)   Pulse 61   Temp 97.6  F (36.4  C) (Tympanic)   Resp 18   Ht 1.695 m (5' 6.73\")   Wt 93.4 kg (206 lb)   SpO2 96%   BMI 32.52 kg/m     Estimated body mass index is 32.52 kg/m  as calculated from the following:    Height as of this encounter: 1.695 m (5' 6.73\").    Weight as of this encounter: 93.4 kg (206 lb).    Physical Exam  GENERAL: alert and no distress  EYES: Eyes grossly normal to inspection, PERRL and conjunctivae and sclerae normal  HENT: ear canals and TM's normal, nose and mouth without ulcers or lesions  NECK: no adenopathy, no asymmetry, masses, or scars  RESP: lungs clear to auscultation - no rales, rhonchi or wheezes  CV: regular rate and rhythm, normal S1 S2, no S3 or S4, no murmur, click or rub, no peripheral edema  ABDOMEN: soft, nontender, no hepatosplenomegaly, no masses and bowel sounds normal   (male): normal male genitalia without lesions or urethral discharge, no hernia  MS: no gross musculoskeletal defects noted, no edema  SKIN: no suspicious lesions or rashes  NEURO: Normal strength and tone, mentation intact and speech normal. Reflexes normal and symmetric in the upper and lower extremities.   PSYCH: mentation " appears normal, affect normal/bright         3/26/2024   Mini Cog   Clock Draw Score 2 Normal   3 Item Recall 3 objects recalled   Mini Cog Total Score 5              Signed Electronically by: Ant Wright MD      The information in this document, created by the medical scribe for me, accurately reflects the services I personally performed and the decisions made by me. I have reviewed and approved this document for accuracy prior to signature.  Heath Suh

## 2024-03-26 NOTE — LETTER
March 28, 2024      Florencio Doshi  [address] [place] [address]        Dear ,    All of your test results were normal or within the usual/expected range for you.     Please contact the clinic if you have additional questions.  Thank you.     Sincerely,       Ant Wright MD     Resulted Orders   Lipid panel reflex to direct LDL Non-fasting   Result Value Ref Range    Cholesterol 121 <200 mg/dL    Triglycerides 117 <150 mg/dL    Direct Measure HDL 44 >=40 mg/dL    LDL Cholesterol Calculated 54 <=100 mg/dL    Non HDL Cholesterol 77 <130 mg/dL    Patient Fasting > 8hrs? Yes     Narrative    Cholesterol  Desirable:  <200 mg/dL    Triglycerides  Normal:  Less than 150 mg/dL  Borderline High:  150-199 mg/dL  High:  200-499 mg/dL  Very High:  Greater than or equal to 500 mg/dL    Direct Measure HDL  Female:  Greater than or equal to 50 mg/dL   Male:  Greater than or equal to 40 mg/dL    LDL Cholesterol  Desirable:  <100mg/dL  Above Desirable:  100-129 mg/dL   Borderline High:  130-159 mg/dL   High:  160-189 mg/dL   Very High:  >= 190 mg/dL    Non HDL Cholesterol  Desirable:  130 mg/dL  Above Desirable:  130-159 mg/dL  Borderline High:  160-189 mg/dL  High:  190-219 mg/dL  Very High:  Greater than or equal to 220 mg/dL   Comprehensive metabolic panel   Result Value Ref Range    Sodium 139 135 - 145 mmol/L      Comment:      Reference intervals for this test were updated on 09/26/2023 to more accurately reflect our healthy population. There may be differences in the flagging of prior results with similar values performed with this method. Interpretation of those prior results can be made in the context of the updated reference intervals.     Potassium 5.0 3.4 - 5.3 mmol/L    Carbon Dioxide (CO2) 27 22 - 29 mmol/L    Anion Gap 10 7 - 15 mmol/L    Urea Nitrogen 20.0 8.0 - 23.0 mg/dL    Creatinine 1.13 0.67 - 1.17 mg/dL    GFR Estimate 69 >60 mL/min/1.73m2    Calcium 9.6 8.8 - 10.2 mg/dL     Chloride 102 98 - 107 mmol/L    Glucose 106 (H) 70 - 99 mg/dL    Alkaline Phosphatase 89 40 - 150 U/L      Comment:      Reference intervals for this test were updated on 11/14/2023 to more accurately reflect our healthy population. There may be differences in the flagging of prior results with similar values performed with this method. Interpretation of those prior results can be made in the context of the updated reference intervals.    AST 31 0 - 45 U/L      Comment:      Reference intervals for this test were updated on 6/12/2023 to more accurately reflect our healthy population. There may be differences in the flagging of prior results with similar values performed with this method. Interpretation of those prior results can be made in the context of the updated reference intervals.    ALT 38 0 - 70 U/L      Comment:      Reference intervals for this test were updated on 6/12/2023 to more accurately reflect our healthy population. There may be differences in the flagging of prior results with similar values performed with this method. Interpretation of those prior results can be made in the context of the updated reference intervals.      Protein Total 7.8 6.4 - 8.3 g/dL    Albumin 4.5 3.5 - 5.2 g/dL    Bilirubin Total 1.0 <=1.2 mg/dL   Hemoglobin A1c   Result Value Ref Range    Hemoglobin A1C 6.2 (H) 0.0 - 5.6 %      Comment:      Normal <5.7%   Prediabetes 5.7-6.4%    Diabetes 6.5% or higher     Note: Adopted from ADA consensus guidelines.   Prostate Specific Antigen Screen   Result Value Ref Range    Prostate Specific Antigen Screen 3.35 0.00 - 6.50 ng/mL    Narrative    This result is obtained using the Roche Elecsys total PSA method on the nora e801 immunoassay analyzer. Results obtained with different assay methods or kits cannot be used interchangeably.

## 2024-03-28 ENCOUNTER — DOCUMENTATION ONLY (OUTPATIENT)
Dept: OTHER | Facility: CLINIC | Age: 74
End: 2024-03-28
Payer: MEDICARE

## 2024-03-28 NOTE — PROGRESS NOTES
Printed results included provider message, and placed results in outgoing mail on 03/28//2024 to be mailed to patients home.    Victoriano Aviles

## 2024-06-04 DIAGNOSIS — M25.531 PAIN IN BOTH WRISTS: ICD-10-CM

## 2024-06-04 DIAGNOSIS — M25.532 PAIN IN BOTH WRISTS: ICD-10-CM

## 2024-06-04 RX ORDER — ETODOLAC 400 MG
400 TABLET ORAL 2 TIMES DAILY WITH MEALS
Qty: 60 TABLET | Refills: 5 | Status: SHIPPED | OUTPATIENT
Start: 2024-06-04

## 2024-06-04 NOTE — TELEPHONE ENCOUNTER
Medication Question or Refill    Contacts         Type Contact Phone/Fax    06/04/2024 09:22 AM CDT Phone (Incoming) Florencio Doshi (Self) [phone] (H)            What medication are you calling about (include dose and sig)?: etodolac (LODINE) 400 MG tablet     Preferred Pharmacy:   [id number] - [place], [place] - [address] [facility] [address]  [facility] [address]  Phone: [phone] Fax: [phone]      Controlled Substance Agreement on file:   CSA -- Patient Level:    CSA: None found at the patient level.       Who prescribed the medication?: Dr Wright    Do you need a refill? Yes     Patient offered an appointment? No    Do you have any questions or concerns?  Yes: Patient needs this sent by Friday, leaving for [place] fishing      Okay to leave a detailed message?: Yes at Home number on file [phone] (home)    Krista Stratton MA  [facility]   Primary Care

## 2024-08-05 DIAGNOSIS — N52.9 ERECTILE DYSFUNCTION, UNSPECIFIED ERECTILE DYSFUNCTION TYPE: ICD-10-CM

## 2024-08-08 RX ORDER — SILDENAFIL CITRATE 20 MG/1
TABLET ORAL
Qty: 50 TABLET | Refills: 3 | Status: SHIPPED | OUTPATIENT
Start: 2024-08-08

## 2024-08-19 DIAGNOSIS — I70.202 ATHEROSCLEROSIS OF NATIVE ARTERY OF LEFT LOWER EXTREMITY, WITH UNSPECIFIED PRESENCE OF CLINICAL MANIFESTATION (H): ICD-10-CM

## 2024-08-19 DIAGNOSIS — I70.0 ATHEROSCLEROSIS OF AORTA (H): ICD-10-CM

## 2024-08-19 RX ORDER — ATORVASTATIN CALCIUM 40 MG/1
TABLET, FILM COATED ORAL
Qty: 90 TABLET | Refills: 1 | OUTPATIENT
Start: 2024-08-19

## 2024-08-19 RX ORDER — ATORVASTATIN CALCIUM 40 MG/1
TABLET, FILM COATED ORAL
Qty: 90 TABLET | Refills: 1 | Status: SHIPPED | OUTPATIENT
Start: 2024-08-19 | End: 2024-09-26

## 2024-09-26 ENCOUNTER — OFFICE VISIT (OUTPATIENT)
Dept: FAMILY MEDICINE | Facility: CLINIC | Age: 74
End: 2024-09-26
Payer: MEDICARE

## 2024-09-26 VITALS
BODY MASS INDEX: 32.68 KG/M2 | WEIGHT: 208.2 LBS | DIASTOLIC BLOOD PRESSURE: 86 MMHG | HEART RATE: 59 BPM | RESPIRATION RATE: 18 BRPM | TEMPERATURE: 97.2 F | HEIGHT: 67 IN | OXYGEN SATURATION: 95 % | SYSTOLIC BLOOD PRESSURE: 136 MMHG

## 2024-09-26 DIAGNOSIS — I70.0 ATHEROSCLEROSIS OF AORTA (H): Primary | ICD-10-CM

## 2024-09-26 DIAGNOSIS — Z23 NEED FOR VACCINATION: ICD-10-CM

## 2024-09-26 DIAGNOSIS — L72.3 SEBACEOUS CYST: ICD-10-CM

## 2024-09-26 DIAGNOSIS — I70.202 ATHEROSCLEROSIS OF NATIVE ARTERY OF LEFT LOWER EXTREMITY, WITH UNSPECIFIED PRESENCE OF CLINICAL MANIFESTATION (H): ICD-10-CM

## 2024-09-26 DIAGNOSIS — I10 ESSENTIAL HYPERTENSION WITH GOAL BLOOD PRESSURE LESS THAN 140/90: ICD-10-CM

## 2024-09-26 LAB
ALT SERPL W P-5'-P-CCNC: 36 U/L (ref 0–70)
CHOLEST SERPL-MCNC: 117 MG/DL
CREAT SERPL-MCNC: 1.13 MG/DL (ref 0.67–1.17)
EGFRCR SERPLBLD CKD-EPI 2021: 69 ML/MIN/1.73M2
FASTING STATUS PATIENT QL REPORTED: YES
HDLC SERPL-MCNC: 46 MG/DL
LDLC SERPL CALC-MCNC: 50 MG/DL
NONHDLC SERPL-MCNC: 71 MG/DL
POTASSIUM SERPL-SCNC: 4.9 MMOL/L (ref 3.4–5.3)
TRIGL SERPL-MCNC: 104 MG/DL

## 2024-09-26 PROCEDURE — G0008 ADMIN INFLUENZA VIRUS VAC: HCPCS | Performed by: FAMILY MEDICINE

## 2024-09-26 PROCEDURE — 80061 LIPID PANEL: CPT | Performed by: FAMILY MEDICINE

## 2024-09-26 PROCEDURE — 90480 ADMN SARSCOV2 VAC 1/ONLY CMP: CPT | Performed by: FAMILY MEDICINE

## 2024-09-26 PROCEDURE — 36415 COLL VENOUS BLD VENIPUNCTURE: CPT | Performed by: FAMILY MEDICINE

## 2024-09-26 PROCEDURE — 84460 ALANINE AMINO (ALT) (SGPT): CPT | Performed by: FAMILY MEDICINE

## 2024-09-26 PROCEDURE — 10060 I&D ABSCESS SIMPLE/SINGLE: CPT | Performed by: FAMILY MEDICINE

## 2024-09-26 PROCEDURE — 91320 SARSCV2 VAC 30MCG TRS-SUC IM: CPT | Performed by: FAMILY MEDICINE

## 2024-09-26 PROCEDURE — 90662 IIV NO PRSV INCREASED AG IM: CPT | Performed by: FAMILY MEDICINE

## 2024-09-26 PROCEDURE — 99214 OFFICE O/P EST MOD 30 MIN: CPT | Mod: 25 | Performed by: FAMILY MEDICINE

## 2024-09-26 PROCEDURE — 82565 ASSAY OF CREATININE: CPT | Performed by: FAMILY MEDICINE

## 2024-09-26 PROCEDURE — 84132 ASSAY OF SERUM POTASSIUM: CPT | Performed by: FAMILY MEDICINE

## 2024-09-26 RX ORDER — ATORVASTATIN CALCIUM 40 MG/1
40 TABLET, FILM COATED ORAL DAILY
Qty: 90 TABLET | Refills: 1 | Status: SHIPPED | OUTPATIENT
Start: 2024-09-26

## 2024-09-26 RX ORDER — LISINOPRIL 30 MG/1
30 TABLET ORAL DAILY
Qty: 90 TABLET | Refills: 1 | Status: SHIPPED | OUTPATIENT
Start: 2024-09-26

## 2024-09-26 ASSESSMENT — PAIN SCALES - GENERAL: PAINLEVEL: NO PAIN (0)

## 2024-09-26 NOTE — PROGRESS NOTES
Assessment & Plan     (I70.0) Atherosclerosis of aorta (H)  (primary encounter diagnosis)  (I70.202) Atherosclerosis of native artery of left lower extremity, with unspecified presence of clinical manifestation (H)  Comment: He is on a high-intensity statin   Plan: atorvastatin (LIPITOR) 40 MG tablet, Lipid         panel reflex to direct LDL Non-fasting, ALT         Return in about 6 months (around 4/2/2025) for Medicare annual wellness exam, cholesterol, blood pressure check.      (I10) Essential hypertension with goal blood pressure less than 140/90  Comment: Well-controlled  Plan: lisinopril (ZESTRIL) 30 MG tablet, Potassium,         Creatinine         Return in about 6 months (around 4/2/2025) for Medicare annual wellness exam, cholesterol, blood pressure check.      (L72.3) Sebaceous cyst  Comment: Visible.  At the corner of the eye.  I do not think it is infected  Plan: Incision and drainage        The lesion is cleansed with alcohol, anesthetized with Lidocaine 1% with epinephrine, and prepped with iodine solution.  The lesion is incised using 15 blade scalpel, making a 4 mm incision.  Keratinized material is expressed from the opening.  The defect is collapsed and cleansed with rubbing alcohol.  Antibiotic ointment and bandage applied. Wound care discussed with the patient during the procedure.  The patient tolerated the procedure well.  Follow-up as needed    (Z23) Need for vaccination  Comment:   Plan: INFLUENZA HIGH DOSE, TRIVALENT, PF (FLUZONE),         COVID-19 12+ (PFIZER)              Tito Matias is a 74 year old, presenting for the following health issues:  Hypertension and Lipids        9/26/2024     8:13 AM   Additional Questions   Roomed by Marva     History of Present Illness       Hyperlipidemia:  He presents for follow up of hyperlipidemia.   He is taking medication to lower cholesterol. He is not having myalgia or other side effects to statin medications.    Hypertension: He  "presents for follow up of hypertension.  He does not check blood pressure  regularly outside of the clinic. Outpatient blood pressures have not been over 140/90. He does not follow a low salt diet.     He eats 2-3 servings of fruits and vegetables daily.He consumes 1 sweetened beverage(s) daily.He exercises with enough effort to increase his heart rate 30 to 60 minutes per day.  He exercises with enough effort to increase his heart rate 4 days per week.   He is taking medications regularly.             Concern - lump near left eye   When did it start?: maybe 6 months ago  Any strain/injury, other illness, or event to trigger this problem?   no   Previous history of similar problem:   no     Location:           Lateral corner of left eye  Describe it:   Feels hard like a BB,   Severity: n/a    Progression of symptoms from onset until now:  worsening    Accompanying Signs & Symptoms:  Not tender What have you noticed that tends to make this worse?     None    What have you noticed that tends to make this better?     None    What have you done (this time) to try to treat this problem yourself?     None    Did it help?                       Objective    /86 (BP Location: Left arm, Patient Position: Sitting, Cuff Size: Adult Large)   Pulse 59   Temp 97.2  F (36.2  C) (Temporal)   Resp 18   Ht 1.697 m (5' 6.83\")   Wt 94.4 kg (208 lb 3.2 oz)   SpO2 95%   BMI 32.77 kg/m    Body mass index is 32.77 kg/m .  Physical Exam   GENERAL: healthy, alert and no distress  EYES: Eyes grossly normal to inspection, PERRL, EOMI, sclerae white and conjunctivae normal  MS: no gross musculoskeletal defects noted, no edema  SKIN: Cystic lesion on the face just lateral to the left eye  NEURO: Normal strength and tone, sensory exam grossly normal, mentation intact, oriented times 3 and cranial nerves 2-12 intact  PSYCH: mentation appears normal, affect normal/bright             Signed Electronically by: Ant Wright MD    "

## 2024-09-26 NOTE — LETTER
October 7, 2024      Florencio Dsohi  [address] [place] [address]        Dear ,    We are writing to inform you of your test results.    All of your labs were normal.    Resulted Orders   Potassium   Result Value Ref Range    Potassium 4.9 3.4 - 5.3 mmol/L   Creatinine   Result Value Ref Range    Creatinine 1.13 0.67 - 1.17 mg/dL    GFR Estimate 69 >60 mL/min/1.73m2      Comment:      eGFR calculated using 2021 CKD-EPI equation.   Lipid panel reflex to direct LDL Non-fasting   Result Value Ref Range    Cholesterol 117 <200 mg/dL    Triglycerides 104 <150 mg/dL    Direct Measure HDL 46 >=40 mg/dL    LDL Cholesterol Calculated 50 <100 mg/dL    Non HDL Cholesterol 71 <130 mg/dL    Patient Fasting > 8hrs? Yes     Narrative    Cholesterol  Desirable: < 200 mg/dL  Borderline High: 200 - 239 mg/dL  High: >= 240 mg/dL    Triglycerides  Normal: < 150 mg/dL  Borderline High: 150 - 199 mg/dL  High: 200-499 mg/dL  Very High: >= 500 mg/dL    Direct Measure HDL  Female: >= 50 mg/dL   Male: >= 40 mg/dL    LDL Cholesterol  Desirable: < 100 mg/dL  Above Desirable: 100 - 129 mg/dL   Borderline High: 130 - 159 mg/dL   High:  160 - 189 mg/dL   Very High: >= 190 mg/dL    Non HDL Cholesterol  Desirable: < 130 mg/dL  Above Desirable: 130 - 159 mg/dL  Borderline High: 160 - 189 mg/dL  High: 190 - 219 mg/dL  Very High: >= 220 mg/dL   ALT   Result Value Ref Range    ALT 36 0 - 70 U/L       If you have any questions or concerns, please call the clinic at the number listed above.       Sincerely,      Ant Wright MD

## 2024-11-25 ENCOUNTER — OFFICE VISIT (OUTPATIENT)
Dept: URGENT CARE | Facility: URGENT CARE | Age: 74
End: 2024-11-25
Payer: MEDICARE

## 2024-11-25 ENCOUNTER — NURSE TRIAGE (OUTPATIENT)
Dept: FAMILY MEDICINE | Facility: CLINIC | Age: 74
End: 2024-11-25
Payer: MEDICARE

## 2024-11-25 VITALS
HEART RATE: 58 BPM | DIASTOLIC BLOOD PRESSURE: 83 MMHG | WEIGHT: 212 LBS | TEMPERATURE: 97.4 F | BODY MASS INDEX: 33.37 KG/M2 | RESPIRATION RATE: 18 BRPM | SYSTOLIC BLOOD PRESSURE: 154 MMHG | OXYGEN SATURATION: 94 %

## 2024-11-25 DIAGNOSIS — L03.032 PARONYCHIA OF GREAT TOE OF LEFT FOOT: Primary | ICD-10-CM

## 2024-11-25 PROCEDURE — 99213 OFFICE O/P EST LOW 20 MIN: CPT

## 2024-11-25 RX ORDER — DOXYCYCLINE HYCLATE 100 MG
100 TABLET ORAL 2 TIMES DAILY
Qty: 14 TABLET | Refills: 0 | Status: SHIPPED | OUTPATIENT
Start: 2024-11-25 | End: 2024-12-02

## 2024-11-25 NOTE — PATIENT INSTRUCTIONS
Take the antibiotic as prescribed and finish the full course even if symptoms improve.  Try yogurt with active cultures or probiotics such as Culturelle daily to help prevent diarrhea while using antibiotics.  Use warm water soaks four or more times a day.  Follow up with your primary care provider should symptoms persist.

## 2024-11-25 NOTE — TELEPHONE ENCOUNTER
Patient reports that his left great toe is red and painful when touched. The nail is pulling away from the skin. It has been this way for a couple of week. There is no drainage from the area. He does not know why it is like this. He has been soaking it in epsom salt here and there.     Denies: streaking, fever, injury     Nursing advice: Patient is to be assessed in urgent care tonight. Patient verbalized good understanding, agrees with plan and needs no further support.  Thank you. Magaly Desir R.N.    Reason for Disposition   Looks like a boil, infected sore, deep ulcer, or other infected rash (spreading redness, pus)    Additional Information   Negative: Foot is cool or blue in comparison to other foot   Negative: Purple or black skin on toe  (Exception: Person remembers bruising the toe from an injury.)   Negative: Patient sounds very sick or weak to the triager   Negative: SEVERE pain (e.g., excruciating, unable to do any normal activities) and not improved after 2 hours of pain medicine    Protocols used: Toe Pain-A-OH

## 2024-11-25 NOTE — PROGRESS NOTES
ASSESSMENT:    (L03.032) Paronychia of great toe of left foot  (primary encounter diagnosis)  Plan: doxycycline hyclate (VIBRA-TABS) 100 MG tablet    PLAN:    Paronychia patient instructions discussed and provided.  Informed the patient to take the antibiotic as prescribed and finish the full course even if symptoms improve.  We discussed trying yogurt with active cultures or probiotic such as Culturelle daily to help prevent diarrhea while taking the antibiotic.  Informed the patient to use warm water soaks up to 4 times a day.   Patient acknowledged their understanding of the above plan.    The use of Dragon/[employer] services may have been used to construct the content in this note; any grammatical or spelling errors are non-intentional. Please contact the author of this note directly if you are in need of any clarification.      PRISCILLA Hurt CNP      SUBJECTIVE:  Florencio Doshi is a 74 year old male who presents with redness, swelling and slight tenderness on the great toe of his left foot for the past 2-3 weeks.  He has been utilizing warm water soaks for treatment.    ROS:  Negative except noted above.      OBJECTIVE:  EXAM:    VITALS: Blood pressure (!) 154/83, pulse 58, temperature 97.4  F (36.3  C), temperature source Tympanic, resp. rate 18, weight 96.2 kg (212 lb), SpO2 94%.  GENERAL:  Florencio Dohsi presents in no apparent distress  Left great toe:  Edema and erythema on the medial aspect near the nailbed with slight tenderness upon palpation.  No drainage or bleeding noted.

## 2024-12-03 ENCOUNTER — NURSE TRIAGE (OUTPATIENT)
Dept: FAMILY MEDICINE | Facility: CLINIC | Age: 74
End: 2024-12-03

## 2024-12-03 ENCOUNTER — OFFICE VISIT (OUTPATIENT)
Dept: FAMILY MEDICINE | Facility: CLINIC | Age: 74
End: 2024-12-03
Payer: MEDICARE

## 2024-12-03 VITALS
HEIGHT: 67 IN | HEART RATE: 60 BPM | RESPIRATION RATE: 20 BRPM | OXYGEN SATURATION: 96 % | BODY MASS INDEX: 32.96 KG/M2 | WEIGHT: 210 LBS | SYSTOLIC BLOOD PRESSURE: 136 MMHG | TEMPERATURE: 97.2 F | DIASTOLIC BLOOD PRESSURE: 83 MMHG

## 2024-12-03 DIAGNOSIS — L60.0 INGROWN LEFT BIG TOENAIL: Primary | ICD-10-CM

## 2024-12-03 PROCEDURE — 99213 OFFICE O/P EST LOW 20 MIN: CPT | Performed by: FAMILY MEDICINE

## 2024-12-03 ASSESSMENT — PAIN SCALES - GENERAL: PAINLEVEL_OUTOF10: EXTREME PAIN (8)

## 2024-12-03 NOTE — TELEPHONE ENCOUNTER
"Pt calling because he was seen in  on 11/25 for pain on his left big toe. Was given antibiotics and just completed them yesterday.  States that he has also been doing the warm soaks and it is not helping. States that pain is still the same and there is some redness around the toe nail. No drainage or puss that he can see. No fever.   Pt wants to see his PCP to be evaluated. Assisted in scheduling a visit for today, 12/3.    SHRAVAN Lyles, RN  [facility]       Reason for Disposition   Localized redness and swelling of skin around nail (i.e., cuticle area or nail fold)    Additional Information   Negative: Followed an injury   Negative: Wound looks infected (e.g., spreading redness, pus)   Negative: Caused by an animal bite   Negative: Caused by frostbite   Negative: Athlete's Foot suspected (i.e., itchy red rash in web space between toes)   Negative: Foot pain is main symptom   Negative: Foot is cool or blue in comparison to other foot   Negative: Purple or black skin on toe  (Exception: Person remembers bruising the toe from an injury.)   Negative: Patient sounds very sick or weak to the triager   Negative: SEVERE pain (e.g., excruciating, unable to do any normal activities) and not improved after 2 hours of pain medicine   Negative: Looks like a boil, infected sore, deep ulcer, or other infected rash (spreading redness, pus)   Negative: Yellow pus seen in skin around toenail (cuticle area), or pus seen under toenail   Negative: Numbness in one foot (i.e., loss of sensation)    Answer Assessment - Initial Assessment Questions  1. ONSET: \"When did the pain start?\"       4-5 weeks ago  2. LOCATION: \"Where is the pain located?\"   (e.g., around nail, entire toe, at foot joint)       Left big toe  3. PAIN: \"How bad is the pain?\"    (Scale 1-10; or mild, moderate, severe)      Mild to moderate.   4. APPEARANCE: \"What does the toe look like?\" (e.g., redness, swelling, bruising, pallor)     " " Red and slightly swollen around the toe nail.  5. CAUSE: \"What do you think is causing the toe pain?\"      Possible infection  6. OTHER SYMPTOMS: \"Do you have any other symptoms?\" (e.g., leg pain, rash, fever, numbness)      Pain when he bumps his toe, slight redness  7. PREGNANCY: \"Is there any chance you are pregnant?\" \"When was your last menstrual period?\"      .    Protocols used: Toe Pain-A-OH    "

## 2024-12-04 ENCOUNTER — PATIENT OUTREACH (OUTPATIENT)
Dept: CARE COORDINATION | Facility: CLINIC | Age: 74
End: 2024-12-04
Payer: MEDICARE

## 2025-01-07 DIAGNOSIS — I10 ESSENTIAL HYPERTENSION WITH GOAL BLOOD PRESSURE LESS THAN 140/90: ICD-10-CM

## 2025-01-07 RX ORDER — LISINOPRIL 30 MG/1
TABLET ORAL
Qty: 90 TABLET | Refills: 1 | OUTPATIENT
Start: 2025-01-07

## 2025-04-03 ENCOUNTER — OFFICE VISIT (OUTPATIENT)
Dept: FAMILY MEDICINE | Facility: CLINIC | Age: 75
End: 2025-04-03
Attending: FAMILY MEDICINE
Payer: MEDICARE

## 2025-04-03 VITALS
RESPIRATION RATE: 16 BRPM | SYSTOLIC BLOOD PRESSURE: 125 MMHG | OXYGEN SATURATION: 94 % | BODY MASS INDEX: 32.3 KG/M2 | DIASTOLIC BLOOD PRESSURE: 80 MMHG | HEIGHT: 67 IN | TEMPERATURE: 96.8 F | HEART RATE: 59 BPM | WEIGHT: 205.8 LBS

## 2025-04-03 DIAGNOSIS — I10 ESSENTIAL HYPERTENSION WITH GOAL BLOOD PRESSURE LESS THAN 140/90: ICD-10-CM

## 2025-04-03 DIAGNOSIS — K76.0 NAFLD (NONALCOHOLIC FATTY LIVER DISEASE): ICD-10-CM

## 2025-04-03 DIAGNOSIS — Z87.442 HISTORY OF URIC ACID STAGHORN CALCULUS: ICD-10-CM

## 2025-04-03 DIAGNOSIS — R73.01 IMPAIRED FASTING GLUCOSE: ICD-10-CM

## 2025-04-03 DIAGNOSIS — Z23 NEED FOR VACCINATION: ICD-10-CM

## 2025-04-03 DIAGNOSIS — I70.202 ATHEROSCLEROSIS OF NATIVE ARTERY OF LEFT LOWER EXTREMITY, WITH UNSPECIFIED PRESENCE OF CLINICAL MANIFESTATION: ICD-10-CM

## 2025-04-03 DIAGNOSIS — Z12.5 PROSTATE CANCER SCREENING: ICD-10-CM

## 2025-04-03 DIAGNOSIS — Z00.00 ENCOUNTER FOR MEDICARE ANNUAL WELLNESS EXAM: Primary | ICD-10-CM

## 2025-04-03 DIAGNOSIS — I70.0 ATHEROSCLEROSIS OF AORTA: ICD-10-CM

## 2025-04-03 LAB
ALBUMIN SERPL BCG-MCNC: 4.5 G/DL (ref 3.5–5.2)
ALP SERPL-CCNC: 93 U/L (ref 40–150)
ALT SERPL W P-5'-P-CCNC: 37 U/L (ref 0–70)
ANION GAP SERPL CALCULATED.3IONS-SCNC: 12 MMOL/L (ref 7–15)
AST SERPL W P-5'-P-CCNC: 30 U/L (ref 0–45)
BILIRUB DIRECT SERPL-MCNC: 0.52 MG/DL (ref 0–0.3)
BILIRUB SERPL-MCNC: 1.2 MG/DL
BUN SERPL-MCNC: 22.8 MG/DL (ref 8–23)
CALCIUM SERPL-MCNC: 9.6 MG/DL (ref 8.8–10.4)
CHLORIDE SERPL-SCNC: 103 MMOL/L (ref 98–107)
CHOLEST SERPL-MCNC: 98 MG/DL
CREAT SERPL-MCNC: 1.31 MG/DL (ref 0.67–1.17)
EGFRCR SERPLBLD CKD-EPI 2021: 57 ML/MIN/1.73M2
EST. AVERAGE GLUCOSE BLD GHB EST-MCNC: 126 MG/DL
FASTING STATUS PATIENT QL REPORTED: YES
FASTING STATUS PATIENT QL REPORTED: YES
GLUCOSE SERPL-MCNC: 110 MG/DL (ref 70–99)
HBA1C MFR BLD: 6 % (ref 0–5.6)
HCO3 SERPL-SCNC: 25 MMOL/L (ref 22–29)
HDLC SERPL-MCNC: 37 MG/DL
LDLC SERPL CALC-MCNC: 39 MG/DL
NONHDLC SERPL-MCNC: 61 MG/DL
POTASSIUM SERPL-SCNC: 4.6 MMOL/L (ref 3.4–5.3)
PROT SERPL-MCNC: 8 G/DL (ref 6.4–8.3)
PSA SERPL DL<=0.01 NG/ML-MCNC: 3.13 NG/ML (ref 0–6.5)
SODIUM SERPL-SCNC: 140 MMOL/L (ref 135–145)
TRIGL SERPL-MCNC: 108 MG/DL

## 2025-04-03 RX ORDER — LISINOPRIL 30 MG/1
30 TABLET ORAL DAILY
Qty: 90 TABLET | Refills: 3 | Status: SHIPPED | OUTPATIENT
Start: 2025-04-03

## 2025-04-03 RX ORDER — ALLOPURINOL 100 MG/1
1 TABLET ORAL DAILY
COMMUNITY
Start: 2025-03-17

## 2025-04-03 RX ORDER — ATORVASTATIN CALCIUM 40 MG/1
40 TABLET, FILM COATED ORAL DAILY
Qty: 90 TABLET | Refills: 3 | Status: SHIPPED | OUTPATIENT
Start: 2025-04-03

## 2025-04-03 SDOH — HEALTH STABILITY: PHYSICAL HEALTH: ON AVERAGE, HOW MANY DAYS PER WEEK DO YOU ENGAGE IN MODERATE TO STRENUOUS EXERCISE (LIKE A BRISK WALK)?: 4 DAYS

## 2025-04-03 ASSESSMENT — PAIN SCALES - GENERAL: PAINLEVEL_OUTOF10: NO PAIN (0)

## 2025-04-03 ASSESSMENT — SOCIAL DETERMINANTS OF HEALTH (SDOH): HOW OFTEN DO YOU GET TOGETHER WITH FRIENDS OR RELATIVES?: ONCE A WEEK

## 2025-04-03 NOTE — PROGRESS NOTES
"Preventive Care Visit  [facility]  Ant Wright MD, Family Medicine  Apr 3, 2025      Assessment & Plan     (Z00.00) Encounter for Medicare annual wellness exam  (primary encounter diagnosis)  Comment: Negative screening exam; up-to-date on preventive services.   Plan: COVID-19 12+ (PFIZER), Prostate Specific         Antigen Screen        Follow-up in 1 year.    (I10) Essential hypertension with goal blood pressure less than 140/90  Comment: well controlled.  Plan: BASIC METABOLIC PANEL, lisinopril (ZESTRIL) 30         MG tablet        Follow-up in 1 year.     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 He is not scheduled to follow-up with his urologist for a year.    Plan: Uric acid        Return in about 6 weeks (around 5/17/2025) for lab tests (uric acid).  Adjust allopurinol as needed based on that result, and recheck every 2+ months as needed.    (I70.202) Atherosclerosis of native artery of left lower extremity, with unspecified presence of clinical manifestation  (I70.0) Atherosclerosis of aorta  Comment: clinically stable, on a high-intensity statin.  Plan: Lipid panel reflex to direct LDL Non-fasting,         atorvastatin (LIPITOR) 40 MG tablet        Follow-up in 1 year.     (R73.01) Impaired fasting glucose  Comment:   Plan: Hemoglobin A1c            (K76.0) NAFLD (nonalcoholic fatty liver disease)  Comment:   Plan: Hepatic panel (Albumin, ALT, AST, Bili, Alk         Phos, TP)            (Z12.5) Prostate cancer screening  Comment:   Plan: Prostate Specific Antigen Screen            (Z23) Need for vaccination  Comment: Booster.  Plan: COVID-19 12+ (PFIZER)              BMI  Estimated body mass index is 32.4 kg/m  as calculated from the following:    Height as of this encounter: 1.697 m (5' 6.83\").    Weight as of this encounter: 93.4 kg (205 lb 12.8 oz). "   Weight management plan: exercise recommendations, as summarized in the AVS.     Counseling  Appropriate preventive services were addressed with this patient via screening, questionnaire, or discussion as appropriate for fall prevention, nutrition, physical activity, Tobacco-use cessation, social engagement, weight loss and cognition.  Checklist reviewing preventive services available has been given to the patient.  Reviewed patient's diet, addressing concerns and/or questions.     Tito Matias is a 75 year old, presenting for the following:  Physical        4/3/2025     7:42 AM   Additional Questions   Roomed by Marva ALEX     A few days ago, the patient reports severe knee pain with swelling. He treated it with ice and elevation. He then had severe diarrhea from 8 pm -1 am that totally cleared him out and he hasn't had a BM since.       Advance Care Planning  Patient has a Health Care Directive on file  Advance care planning document is on file and is current.      4/3/2025   General Health   How would you rate your overall physical health? Good   Feel stress (tense, anxious, or unable to sleep) Not at all         4/3/2025   Nutrition   Diet: Regular (no restrictions)         4/3/2025   Exercise   Days per week of moderate/strenous exercise 4 days         4/3/2025   Social Factors   Frequency of gathering with friends or relatives Once a week   Worry food won't last until get money to buy more No   Food not last or not have enough money for food? No   Do you have housing? (Housing is defined as stable permanent housing and does not include staying ouside in a car, in a tent, in an abandoned building, in an overnight shelter, or couch-surfing.) Yes   Are you worried about losing your housing? No   Lack of transportation? No   Unable to get utilities (heat,electricity)? No         4/3/2025   Fall Risk   Fallen 2 or more times in the past year? No   Trouble with walking or balance? No           4/3/2025   Activities of Daily Living- Home Safety   Needs help with the following daily activites None of the above   Safety concerns in the home None of the above         4/3/2025   Dental   Dentist two times every year? Yes         4/3/2025   Hearing Screening   Hearing concerns? None of the above         4/3/2025   Driving Risk Screening   Patient/family members have concerns about driving No         4/3/2025   General Alertness/Fatigue Screening   Have you been more tired than usual lately? No         4/3/2025   Urinary Incontinence Screening   Bothered by leaking urine in past 6 months No           3/26/2024   TB Screening   Were you born outside of the [place]? No     Today's PHQ-2 Score:       4/3/2025     7:43 AM   PHQ-2 ( 1999 [employer])   Q1: Little interest or pleasure in doing things 0   Q2: Feeling down, depressed or hopeless 0   PHQ-2 Score 0    Q1: Little interest or pleasure in doing things Not at all   Q2: Feeling down, depressed or hopeless Not at all   PHQ-2 Score 0       Patient-reported           4/3/2025   Substance Use   Alcohol more than 3/day or more than 7/wk No   Do you have a current opioid prescription? No   How severe/bad is pain from 1 to 10? 0/10 (No Pain)   Do you use any other substances recreationally? No     Social History     Tobacco Use    Smoking status: Never    Smokeless tobacco: Never   Vaping Use    Vaping status: Never Used   Substance Use Topics    Alcohol use: Yes     Comment: occassionally    Drug use: No           4/3/2025   AAA Screening   Family history of Abdominal Aortic Aneurysm (AAA)? No   ASCVD Risk   The ASCVD Risk score (Parvez DK, et al., 2019) failed to calculate for the following reasons:    The valid total cholesterol range is 130 to 320 mg/dL        Reviewed and updated as needed this visit by Provider   Tobacco   Meds   Med Hx  Surg Hx  Fam Hx            Current providers sharing in care for this patient include:  Patient Care Team:  Kyle  "Ant STONE MD as PCP - General  Ant Wright MD as Assigned PCP  Ant Wright MD as MD (Family Practice)  Yuni Robbins MD as MD (Internal Medicine)  Abbe Durbin DPM as Assigned Surgical Provider    The following health maintenance items are reviewed in Epic and correct as of today:  Health Maintenance   Topic Date Due    URIC ACID  05/25/2018    BMP  03/26/2025    COVID-19 Vaccine (9 - 2024-25 season) 03/26/2025    LIPID  09/26/2025    MEDICARE ANNUAL WELLNESS VISIT  04/03/2026    ANNUAL REVIEW OF HM ORDERS  04/03/2026    FALL RISK ASSESSMENT  04/03/2026    DIABETES SCREENING  03/26/2027    COLORECTAL CANCER SCREENING  10/19/2027    ADVANCE CARE PLANNING  03/28/2029    DTAP/TDAP/TD IMMUNIZATION (4 - Td or Tdap) 06/03/2029    HEPATITIS C SCREENING  Completed    PHQ-2 (once per calendar year)  Completed    INFLUENZA VACCINE  Completed    Pneumococcal Vaccine: 50+ Years  Completed    ZOSTER IMMUNIZATION  Completed    RSV VACCINE  Completed    HPV IMMUNIZATION  Aged Out    MENINGITIS IMMUNIZATION  Aged Out     Review of Systems       Objective    Exam  /80 (BP Location: Left arm, Patient Position: Sitting, Cuff Size: Adult Large)   Pulse 59   Temp 96.8  F (36  C) (Temporal)   Resp 16   Ht 1.697 m (5' 6.83\")   Wt 93.4 kg (205 lb 12.8 oz)   SpO2 94%   BMI 32.40 kg/m     Estimated body mass index is 32.4 kg/m  as calculated from the following:    Height as of this encounter: 1.697 m (5' 6.83\").    Weight as of this encounter: 93.4 kg (205 lb 12.8 oz).    Physical Exam  GENERAL: alert and no distress  EYES: Eyes grossly normal to inspection, PERRL and conjunctivae and sclerae normal  HENT: ear canals and TM's normal, nose and mouth without ulcers or lesions  NECK: no adenopathy, no asymmetry, masses, or scars  RESP: lungs clear to auscultation - no rales, rhonchi or wheezes  CV: regular rate and rhythm, normal S1 S2, no S3 or S4, no murmur, click or rub, no peripheral edema  ABDOMEN: " soft, nontender, no hepatosplenomegaly, no masses and bowel sounds normal   (male): normal male genitalia without lesions or urethral discharge, no hernia  MS: no gross musculoskeletal defects noted, no edema  SKIN: no suspicious lesions or rashes  NEURO: Normal strength and tone, mentation intact and speech normal  PSYCH: mentation appears normal, affect normal/bright        4/3/2025   Mini Cog   Clock Draw Score 2 Normal   3 Item Recall 3 objects recalled   Mini Cog Total Score 5          This document serves as a record of the services and decisions personally performed and made by Dr. Wright. It was created on his behalf by Tomasa Chatterjee, a trained medical scribe. The creation of this document is based the provider's statements to the medical scribe.  Tomasa Chatterjee, 8:27 AM         Signed Electronically by: Ant Wright MD

## 2025-04-03 NOTE — PATIENT INSTRUCTIONS
Patient Education   Preventive Care Advice   This is general advice given by our system to help you stay healthy. However, your care team may have specific advice just for you. Please talk to your care team about your preventive care needs.  Nutrition  Eat 5 or more servings of fruits and vegetables each day.  Try wheat bread, brown rice and whole grain pasta (instead of white bread, rice, and pasta).  Get enough calcium and vitamin D. Check the label on foods and aim for 100% of the RDA (recommended daily allowance).  Lifestyle  Exercise at least 150 minutes each week  (30 minutes a day, 5 days a week).  Do muscle strengthening activities 2 days a week. These help control your weight and prevent disease.  No smoking.  Wear sunscreen to prevent skin cancer.  Have a dental exam and cleaning every 6 months.  Yearly exams  See your health care team every year to talk about:  Any changes in your health.  Any medicines your care team has prescribed.  Preventive care, family planning, and ways to prevent chronic diseases.  Shots (vaccines)   HPV shots (up to age 26), if you've never had them before.  Hepatitis B shots (up to age 59), if you've never had them before.  COVID-19 shot: Get this shot when it's due.  Flu shot: Get a flu shot every year.  Tetanus shot: Get a tetanus shot every 10 years.  Pneumococcal, hepatitis A, and RSV shots: Ask your care team if you need these based on your risk.  Shingles shot (for age 50 and up)  General health tests  Diabetes screening:  Starting at age 35, Get screened for diabetes at least every 3 years.  If you are younger than age 35, ask your care team if you should be screened for diabetes.  Cholesterol test: At age 39, start having a cholesterol test every 5 years, or more often if advised.  Bone density scan (DEXA): At age 50, ask your care team if you should have this scan for osteoporosis (brittle bones).  Hepatitis C: Get tested at least once in your life.  STIs (sexually  transmitted infections)  Before age 24: Ask your care team if you should be screened for STIs.  After age 24: Get screened for STIs if you're at risk. You are at risk for STIs (including HIV) if:  You are sexually active with more than one person.  You don't use condoms every time.  You or a partner was diagnosed with a sexually transmitted infection.  If you are at risk for HIV, ask about PrEP medicine to prevent HIV.  Get tested for HIV at least once in your life, whether you are at risk for HIV or not.  Cancer screening tests  Cervical cancer screening: If you have a cervix, begin getting regular cervical cancer screening tests starting at age 21.  Breast cancer scan (mammogram): If you've ever had breasts, begin having regular mammograms starting at age 40. This is a scan to check for breast cancer.  Colon cancer screening: It is important to start screening for colon cancer at age 45.  Have a colonoscopy test every 10 years (or more often if you're at risk) Or, ask your provider about stool tests like a FIT test every year or Cologuard test every 3 years.  To learn more about your testing options, visit:   .  For help making a decision, visit:   https://bit.ly/mk03899.  Prostate cancer screening test: If you have a prostate, ask your care team if a prostate cancer screening test (PSA) at age 55 is right for you.  Lung cancer screening: If you are a current or former smoker ages 50 to 80, ask your care team if ongoing lung cancer screenings are right for you.  For informational purposes only. Not to replace the advice of your health care provider. Copyright   2023 [employer]. All rights reserved. Clinically reviewed by the [facility] Transitions Program. [employer] 414165 - REV 01/24.

## 2025-05-21 ENCOUNTER — LAB (OUTPATIENT)
Dept: LAB | Facility: CLINIC | Age: 75
End: 2025-05-21
Payer: MEDICARE

## 2025-05-21 DIAGNOSIS — Z87.442 HISTORY OF URIC ACID STAGHORN CALCULUS: ICD-10-CM

## 2025-05-21 LAB — URATE SERPL-MCNC: 6.1 MG/DL (ref 3.4–7)

## 2025-05-21 PROCEDURE — 84550 ASSAY OF BLOOD/URIC ACID: CPT

## 2025-05-21 PROCEDURE — 36415 COLL VENOUS BLD VENIPUNCTURE: CPT

## 2025-06-03 NOTE — PATIENT INSTRUCTIONS
At [facility], we strive to deliver an exceptional experience to you, every time we see you. If you receive a survey, please let us know what we are doing well and/or what we could improve upon, as we do value your feedback.  If you have MyChart, you can expect to receive results automatically within 24 hours of their completion.  Your provider will send a note interpreting your results as well.   If you do not have MyChart, you should receive your results in about a week by mail.    Your care team:                            Family Medicine Internal Medicine   MD Ortiz Carson, MD Talya Farmer, MD Christopher Cordova, MD Tanika Vo, PAEnioC    Elijah Stark, MD Pediatrics   Huyen Cuellar, MD Celia Nance, MD Gayatri Waller, APRN CNP Katy Blake APRN CNP   MD Paulette Nuñez, MD Hnanah Rodriguez, CNP     Bertram Ray, CNP Same-Day Provider (No follow-up visits)   PRISCILLA Marti, DNP Gricelda Duke, PRISCILLA Mallory, FNP, BC DEMARCO RomeroC     Clinic hours: Monday - Thursday 7 am-6 pm; Fridays 7 am-5 pm.   Urgent care: Monday - Friday 10 am- 8 pm; Saturday and Sunday 9 am-5 pm.    Clinic: [phone]       [facility]: Monday - Thursday 8 am - 7 pm; Friday 8 am - 6 pm  [facility]: [phone]      03-Jun-2025 01:51

## (undated) DEVICE — SOL WATER IRRIG 1000ML BOTTLE 07139-09

## (undated) RX ORDER — FENTANYL CITRATE 50 UG/ML
INJECTION, SOLUTION INTRAMUSCULAR; INTRAVENOUS
Status: DISPENSED
Start: 2017-10-19